# Patient Record
Sex: FEMALE | Race: BLACK OR AFRICAN AMERICAN | Employment: FULL TIME | URBAN - METROPOLITAN AREA
[De-identification: names, ages, dates, MRNs, and addresses within clinical notes are randomized per-mention and may not be internally consistent; named-entity substitution may affect disease eponyms.]

---

## 2017-01-24 ENCOUNTER — OFFICE VISIT (OUTPATIENT)
Dept: INTERNAL MEDICINE | Age: 52
End: 2017-01-24

## 2017-01-24 VITALS
HEIGHT: 65 IN | TEMPERATURE: 97.4 F | HEART RATE: 99 BPM | BODY MASS INDEX: 30.49 KG/M2 | DIASTOLIC BLOOD PRESSURE: 82 MMHG | WEIGHT: 183 LBS | SYSTOLIC BLOOD PRESSURE: 120 MMHG

## 2017-01-24 DIAGNOSIS — Z00.00 ANNUAL PHYSICAL EXAM: Primary | ICD-10-CM

## 2017-01-24 DIAGNOSIS — Z00.00 ANNUAL PHYSICAL EXAM: ICD-10-CM

## 2017-01-24 LAB
ANION GAP SERPL CALCULATED.3IONS-SCNC: 14 MEQ/L (ref 7–13)
BUN BLDV-MCNC: 11 MG/DL (ref 6–20)
CALCIUM SERPL-MCNC: 9.4 MG/DL (ref 8.6–10.2)
CHLORIDE BLD-SCNC: 97 MEQ/L (ref 98–107)
CHOLESTEROL, TOTAL: 244 MG/DL (ref 0–199)
CO2: 24 MEQ/L (ref 22–29)
CREAT SERPL-MCNC: 0.89 MG/DL (ref 0.5–0.9)
GFR AFRICAN AMERICAN: >60
GFR NON-AFRICAN AMERICAN: >60
GLUCOSE BLD-MCNC: 109 MG/DL (ref 74–109)
HBA1C MFR BLD: 5.7 % (ref 4.8–5.9)
HDLC SERPL-MCNC: 77 MG/DL (ref 40–59)
LDL CHOLESTEROL CALCULATED: 148 MG/DL (ref 0–129)
POTASSIUM SERPL-SCNC: 4.3 MEQ/L (ref 3.5–5.1)
SODIUM BLD-SCNC: 135 MEQ/L (ref 132–144)
TRIGL SERPL-MCNC: 93 MG/DL (ref 0–200)

## 2017-01-24 PROCEDURE — 99396 PREV VISIT EST AGE 40-64: CPT | Performed by: INTERNAL MEDICINE

## 2017-01-24 RX ORDER — FAMOTIDINE 40 MG/1
40 TABLET, FILM COATED ORAL EVERY EVENING
Qty: 30 TABLET | Refills: 5 | Status: SHIPPED | OUTPATIENT
Start: 2017-01-24 | End: 2019-12-13 | Stop reason: SDUPTHER

## 2017-01-24 RX ORDER — ALPRAZOLAM 0.5 MG/1
TABLET ORAL
Qty: 90 TABLET | Refills: 0 | Status: SHIPPED | OUTPATIENT
Start: 2017-01-24 | End: 2017-06-09 | Stop reason: SDUPTHER

## 2017-01-24 RX ORDER — ALBUTEROL SULFATE 90 UG/1
AEROSOL, METERED RESPIRATORY (INHALATION)
Qty: 6.7 G | Refills: 3 | Status: SHIPPED | OUTPATIENT
Start: 2017-01-24 | End: 2017-09-25 | Stop reason: SDUPTHER

## 2017-01-24 RX ORDER — PRAVASTATIN SODIUM 20 MG
20 TABLET ORAL EVERY EVENING
Qty: 30 TABLET | Refills: 5 | Status: SHIPPED | OUTPATIENT
Start: 2017-01-24 | End: 2017-11-28 | Stop reason: SDUPTHER

## 2017-01-24 RX ORDER — ALBUTEROL SULFATE 2.5 MG/3ML
SOLUTION RESPIRATORY (INHALATION)
Qty: 450 ML | Refills: 1 | Status: SHIPPED | OUTPATIENT
Start: 2017-01-24 | End: 2017-06-12

## 2017-01-24 RX ORDER — AMLODIPINE BESYLATE 5 MG/1
TABLET ORAL
Qty: 30 TABLET | Refills: 5 | Status: SHIPPED | OUTPATIENT
Start: 2017-01-24 | End: 2017-08-28 | Stop reason: SDUPTHER

## 2017-01-24 ASSESSMENT — ENCOUNTER SYMPTOMS
ABDOMINAL PAIN: 0
EYE DISCHARGE: 0
BLOOD IN STOOL: 0
ABDOMINAL DISTENTION: 0
VOICE CHANGE: 0
TROUBLE SWALLOWING: 0
PHOTOPHOBIA: 0
CHEST TIGHTNESS: 0
COUGH: 0
COLOR CHANGE: 0
EYE PAIN: 0
SHORTNESS OF BREATH: 0
BACK PAIN: 0

## 2017-01-24 ASSESSMENT — PATIENT HEALTH QUESTIONNAIRE - PHQ9
1. LITTLE INTEREST OR PLEASURE IN DOING THINGS: 0
SUM OF ALL RESPONSES TO PHQ9 QUESTIONS 1 & 2: 0
2. FEELING DOWN, DEPRESSED OR HOPELESS: 0
SUM OF ALL RESPONSES TO PHQ QUESTIONS 1-9: 0

## 2017-05-09 ENCOUNTER — TELEPHONE (OUTPATIENT)
Dept: INTERNAL MEDICINE | Age: 52
End: 2017-05-09

## 2017-05-09 RX ORDER — DOXYCYCLINE 100 MG/1
100 TABLET ORAL 2 TIMES DAILY
Qty: 20 TABLET | Refills: 0 | Status: SHIPPED | OUTPATIENT
Start: 2017-05-09 | End: 2017-05-19

## 2017-05-09 RX ORDER — METHYLPREDNISOLONE 4 MG/1
TABLET ORAL
Qty: 1 KIT | Refills: 0 | Status: SHIPPED | OUTPATIENT
Start: 2017-05-09 | End: 2017-06-14

## 2017-05-20 ENCOUNTER — HOSPITAL ENCOUNTER (EMERGENCY)
Age: 52
Discharge: HOME | End: 2017-05-20
Payer: COMMERCIAL

## 2017-05-20 ENCOUNTER — APPOINTMENT (OUTPATIENT)
Dept: GENERAL RADIOLOGY | Age: 52
End: 2017-05-20
Payer: COMMERCIAL

## 2017-05-20 VITALS
BODY MASS INDEX: 30.29 KG/M2 | HEART RATE: 111 BPM | DIASTOLIC BLOOD PRESSURE: 88 MMHG | TEMPERATURE: 98.4 F | SYSTOLIC BLOOD PRESSURE: 126 MMHG | RESPIRATION RATE: 18 BRPM | OXYGEN SATURATION: 97 % | WEIGHT: 182 LBS

## 2017-05-20 DIAGNOSIS — J01.10 ACUTE NON-RECURRENT FRONTAL SINUSITIS: Primary | ICD-10-CM

## 2017-05-20 LAB
ALBUMIN SERPL-MCNC: 4.3 G/DL (ref 3.9–4.9)
ALP BLD-CCNC: 65 U/L (ref 40–130)
ALT SERPL-CCNC: 12 U/L (ref 0–33)
ANION GAP SERPL CALCULATED.3IONS-SCNC: 13 MEQ/L (ref 7–13)
AST SERPL-CCNC: 16 U/L (ref 0–35)
BASOPHILS ABSOLUTE: 0.1 K/UL (ref 0–0.2)
BASOPHILS RELATIVE PERCENT: 0.9 %
BILIRUB SERPL-MCNC: 0.7 MG/DL (ref 0–1.2)
BUN BLDV-MCNC: 8 MG/DL (ref 6–20)
CALCIUM SERPL-MCNC: 9 MG/DL (ref 8.6–10.2)
CHLORIDE BLD-SCNC: 101 MEQ/L (ref 98–107)
CO2: 22 MEQ/L (ref 22–29)
CREAT SERPL-MCNC: 0.8 MG/DL (ref 0.5–0.9)
EOSINOPHILS ABSOLUTE: 0.9 K/UL (ref 0–0.7)
EOSINOPHILS RELATIVE PERCENT: 11.4 %
GFR AFRICAN AMERICAN: >60
GFR NON-AFRICAN AMERICAN: >60
GLOBULIN: 2.6 G/DL (ref 2.3–3.5)
GLUCOSE BLD-MCNC: 113 MG/DL (ref 74–109)
HCT VFR BLD CALC: 36.1 % (ref 37–47)
HEMOGLOBIN: 10.9 G/DL (ref 12–16)
LYMPHOCYTES ABSOLUTE: 1.1 K/UL (ref 1–4.8)
LYMPHOCYTES RELATIVE PERCENT: 13.5 %
MCH RBC QN AUTO: 23.5 PG (ref 27–31.3)
MCHC RBC AUTO-ENTMCNC: 30.3 % (ref 33–37)
MCV RBC AUTO: 77.8 FL (ref 82–100)
MONOCYTES ABSOLUTE: 0.8 K/UL (ref 0.2–0.8)
MONOCYTES RELATIVE PERCENT: 9.9 %
NEUTROPHILS ABSOLUTE: 5.1 K/UL (ref 1.4–6.5)
NEUTROPHILS RELATIVE PERCENT: 64.3 %
PDW BLD-RTO: 17.9 % (ref 11.5–14.5)
PLATELET # BLD: 277 K/UL (ref 130–400)
POTASSIUM SERPL-SCNC: 3.6 MEQ/L (ref 3.5–5.1)
RBC # BLD: 4.64 M/UL (ref 4.2–5.4)
SODIUM BLD-SCNC: 136 MEQ/L (ref 132–144)
TOTAL CK: 113 U/L (ref 0–170)
TOTAL PROTEIN: 6.9 G/DL (ref 6.4–8.1)
TROPONIN: <0.01 NG/ML (ref 0–0.01)
WBC # BLD: 7.9 K/UL (ref 4.8–10.8)

## 2017-05-20 PROCEDURE — 6360000002 HC RX W HCPCS: Performed by: NURSE PRACTITIONER

## 2017-05-20 PROCEDURE — 85025 COMPLETE CBC W/AUTO DIFF WBC: CPT

## 2017-05-20 PROCEDURE — 96374 THER/PROPH/DIAG INJ IV PUSH: CPT

## 2017-05-20 PROCEDURE — 84484 ASSAY OF TROPONIN QUANT: CPT

## 2017-05-20 PROCEDURE — 99285 EMERGENCY DEPT VISIT HI MDM: CPT

## 2017-05-20 PROCEDURE — 2580000003 HC RX 258: Performed by: NURSE PRACTITIONER

## 2017-05-20 PROCEDURE — 6370000000 HC RX 637 (ALT 250 FOR IP): Performed by: NURSE PRACTITIONER

## 2017-05-20 PROCEDURE — 93005 ELECTROCARDIOGRAM TRACING: CPT

## 2017-05-20 PROCEDURE — 96361 HYDRATE IV INFUSION ADD-ON: CPT

## 2017-05-20 PROCEDURE — 82550 ASSAY OF CK (CPK): CPT

## 2017-05-20 PROCEDURE — 80053 COMPREHEN METABOLIC PANEL: CPT

## 2017-05-20 PROCEDURE — 71010 XR CHEST PORTABLE: CPT

## 2017-05-20 PROCEDURE — 94640 AIRWAY INHALATION TREATMENT: CPT

## 2017-05-20 PROCEDURE — 36415 COLL VENOUS BLD VENIPUNCTURE: CPT

## 2017-05-20 RX ORDER — 0.9 % SODIUM CHLORIDE 0.9 %
1000 INTRAVENOUS SOLUTION INTRAVENOUS ONCE
Status: COMPLETED | OUTPATIENT
Start: 2017-05-20 | End: 2017-05-20

## 2017-05-20 RX ORDER — BENZONATATE 200 MG/1
200 CAPSULE ORAL 3 TIMES DAILY PRN
Qty: 21 CAPSULE | Refills: 0 | Status: SHIPPED | OUTPATIENT
Start: 2017-05-20 | End: 2019-11-25 | Stop reason: SDUPTHER

## 2017-05-20 RX ORDER — PREDNISONE 50 MG/1
50 TABLET ORAL DAILY
Qty: 5 TABLET | Refills: 0 | Status: SHIPPED | OUTPATIENT
Start: 2017-05-20 | End: 2017-05-25

## 2017-05-20 RX ORDER — ACETAMINOPHEN 500 MG
1000 TABLET ORAL ONCE
Status: DISCONTINUED | OUTPATIENT
Start: 2017-05-20 | End: 2017-05-20 | Stop reason: HOSPADM

## 2017-05-20 RX ORDER — IPRATROPIUM BROMIDE AND ALBUTEROL SULFATE 2.5; .5 MG/3ML; MG/3ML
1 SOLUTION RESPIRATORY (INHALATION) ONCE
Status: COMPLETED | OUTPATIENT
Start: 2017-05-20 | End: 2017-05-20

## 2017-05-20 RX ORDER — METHYLPREDNISOLONE SODIUM SUCCINATE 125 MG/2ML
125 INJECTION, POWDER, LYOPHILIZED, FOR SOLUTION INTRAMUSCULAR; INTRAVENOUS ONCE
Status: COMPLETED | OUTPATIENT
Start: 2017-05-20 | End: 2017-05-20

## 2017-05-20 RX ADMIN — ALBUTEROL SULFATE 5 MG: 2.5 SOLUTION RESPIRATORY (INHALATION) at 08:47

## 2017-05-20 RX ADMIN — IPRATROPIUM BROMIDE AND ALBUTEROL SULFATE 1 AMPULE: .5; 3 SOLUTION RESPIRATORY (INHALATION) at 08:33

## 2017-05-20 RX ADMIN — SODIUM CHLORIDE 1000 ML: 9 INJECTION, SOLUTION INTRAVENOUS at 10:12

## 2017-05-20 RX ADMIN — METHYLPREDNISOLONE SODIUM SUCCINATE 125 MG: 125 INJECTION, POWDER, FOR SOLUTION INTRAMUSCULAR; INTRAVENOUS at 09:12

## 2017-05-20 ASSESSMENT — ENCOUNTER SYMPTOMS
SINUS PRESSURE: 1
WHEEZING: 1
TROUBLE SWALLOWING: 0
COUGH: 1
SHORTNESS OF BREATH: 0
NAUSEA: 1
SORE THROAT: 0
ABDOMINAL PAIN: 0
RHINORRHEA: 1

## 2017-05-20 ASSESSMENT — PAIN DESCRIPTION - ORIENTATION: ORIENTATION: MID

## 2017-05-20 ASSESSMENT — PAIN DESCRIPTION - LOCATION: LOCATION: CHEST

## 2017-05-20 ASSESSMENT — PAIN SCALES - GENERAL: PAINLEVEL_OUTOF10: 10

## 2017-05-20 ASSESSMENT — PAIN DESCRIPTION - PAIN TYPE: TYPE: ACUTE PAIN

## 2017-05-26 LAB
EKG ATRIAL RATE: 118 BPM
EKG P AXIS: 64 DEGREES
EKG P-R INTERVAL: 144 MS
EKG Q-T INTERVAL: 342 MS
EKG QRS DURATION: 84 MS
EKG QTC CALCULATION (BAZETT): 479 MS
EKG R AXIS: 12 DEGREES
EKG T AXIS: 13 DEGREES
EKG VENTRICULAR RATE: 118 BPM

## 2017-06-12 RX ORDER — MONTELUKAST SODIUM 10 MG/1
TABLET ORAL
Qty: 90 TABLET | Refills: 0 | Status: SHIPPED | OUTPATIENT
Start: 2017-06-12 | End: 2018-04-30 | Stop reason: SDUPTHER

## 2017-06-12 RX ORDER — BUDESONIDE 0.25 MG/2ML
INHALANT ORAL
Qty: 120 ML | Refills: 0 | Status: SHIPPED | OUTPATIENT
Start: 2017-06-12 | End: 2019-11-25 | Stop reason: SDUPTHER

## 2017-06-12 RX ORDER — ALBUTEROL SULFATE 2.5 MG/3ML
SOLUTION RESPIRATORY (INHALATION)
Qty: 450 ML | Refills: 0 | Status: SHIPPED | OUTPATIENT
Start: 2017-06-12 | End: 2017-07-07 | Stop reason: SDUPTHER

## 2017-06-12 RX ORDER — ALPRAZOLAM 0.5 MG/1
TABLET ORAL
Qty: 90 TABLET | Refills: 0 | Status: SHIPPED | OUTPATIENT
Start: 2017-06-12 | End: 2017-07-07 | Stop reason: SDUPTHER

## 2017-06-14 ENCOUNTER — TELEPHONE (OUTPATIENT)
Dept: INTERNAL MEDICINE | Age: 52
End: 2017-06-14

## 2017-06-14 RX ORDER — PREDNISONE 10 MG/1
20 TABLET ORAL 2 TIMES DAILY
Qty: 20 TABLET | Refills: 0 | Status: SHIPPED | OUTPATIENT
Start: 2017-06-14 | End: 2017-08-21 | Stop reason: SDUPTHER

## 2017-07-07 RX ORDER — ALPRAZOLAM 0.5 MG/1
TABLET ORAL
Qty: 30 TABLET | Refills: 0 | Status: SHIPPED | OUTPATIENT
Start: 2017-07-07 | End: 2018-01-30

## 2017-07-08 RX ORDER — ALBUTEROL SULFATE 2.5 MG/3ML
SOLUTION RESPIRATORY (INHALATION)
Qty: 450 ML | Refills: 0 | Status: SHIPPED | OUTPATIENT
Start: 2017-07-08 | End: 2017-11-30 | Stop reason: SDUPTHER

## 2017-08-01 ENCOUNTER — HOSPITAL ENCOUNTER (EMERGENCY)
Age: 52
Discharge: HOME | End: 2017-08-01
Attending: STUDENT IN AN ORGANIZED HEALTH CARE EDUCATION/TRAINING PROGRAM
Payer: COMMERCIAL

## 2017-08-01 ENCOUNTER — APPOINTMENT (OUTPATIENT)
Dept: GENERAL RADIOLOGY | Age: 52
End: 2017-08-01
Payer: COMMERCIAL

## 2017-08-01 VITALS
BODY MASS INDEX: 31.28 KG/M2 | RESPIRATION RATE: 16 BRPM | OXYGEN SATURATION: 100 % | TEMPERATURE: 97.8 F | SYSTOLIC BLOOD PRESSURE: 140 MMHG | WEIGHT: 188 LBS | HEART RATE: 96 BPM | DIASTOLIC BLOOD PRESSURE: 93 MMHG

## 2017-08-01 DIAGNOSIS — J30.1 SEASONAL ALLERGIC RHINITIS DUE TO POLLEN: ICD-10-CM

## 2017-08-01 DIAGNOSIS — J45.31 MILD PERSISTENT ASTHMA WITH ACUTE EXACERBATION: Primary | ICD-10-CM

## 2017-08-01 LAB
ALBUMIN SERPL-MCNC: 4.7 G/DL (ref 3.9–4.9)
ALP BLD-CCNC: 74 U/L (ref 40–130)
ALT SERPL-CCNC: 18 U/L (ref 0–33)
ANION GAP SERPL CALCULATED.3IONS-SCNC: 15 MEQ/L (ref 7–13)
ANISOCYTOSIS: ABNORMAL
AST SERPL-CCNC: 24 U/L (ref 0–35)
BASOPHILS ABSOLUTE: 0.1 K/UL (ref 0–0.2)
BASOPHILS RELATIVE PERCENT: 1 %
BILIRUB SERPL-MCNC: 0.9 MG/DL (ref 0–1.2)
BUN BLDV-MCNC: 11 MG/DL (ref 6–20)
CALCIUM SERPL-MCNC: 9.7 MG/DL (ref 8.6–10.2)
CHLORIDE BLD-SCNC: 102 MEQ/L (ref 98–107)
CO2: 24 MEQ/L (ref 22–29)
CREAT SERPL-MCNC: 0.74 MG/DL (ref 0.5–0.9)
EOSINOPHILS ABSOLUTE: 0.9 K/UL (ref 0–0.7)
EOSINOPHILS RELATIVE PERCENT: 12 %
GFR AFRICAN AMERICAN: >60
GFR NON-AFRICAN AMERICAN: >60
GLOBULIN: 3.1 G/DL (ref 2.3–3.5)
GLUCOSE BLD-MCNC: 100 MG/DL (ref 74–109)
HCT VFR BLD CALC: 38.9 % (ref 37–47)
HEMOGLOBIN: 12.2 G/DL (ref 12–16)
HYPOCHROMIA: ABNORMAL
LYMPHOCYTES ABSOLUTE: 0.4 K/UL (ref 1–4.8)
LYMPHOCYTES RELATIVE PERCENT: 6 %
MACROCYTES: 0
MAGNESIUM: 1.9 MG/DL (ref 1.7–2.3)
MCH RBC QN AUTO: 23.4 PG (ref 27–31.3)
MCHC RBC AUTO-ENTMCNC: 31.2 % (ref 33–37)
MCV RBC AUTO: 75 FL (ref 82–100)
MICROCYTES: ABNORMAL
MONOCYTES ABSOLUTE: 0.4 K/UL (ref 0.2–0.8)
MONOCYTES RELATIVE PERCENT: 5.4 %
NEUTROPHILS ABSOLUTE: 5.3 K/UL (ref 1.4–6.5)
NEUTROPHILS RELATIVE PERCENT: 75 %
PDW BLD-RTO: 17.5 % (ref 11.5–14.5)
PLATELET # BLD: 347 K/UL (ref 130–400)
PLATELET SLIDE REVIEW: ADEQUATE
POIKILOCYTES: 0
POTASSIUM SERPL-SCNC: 3.8 MEQ/L (ref 3.5–5.1)
PREGNANCY TEST URINE, POC: NORMAL
PROMONOCYTES: 1 %
RBC # BLD: 5.19 M/UL (ref 4.2–5.4)
SLIDE REVIEW: ABNORMAL
SODIUM BLD-SCNC: 141 MEQ/L (ref 132–144)
TOTAL PROTEIN: 7.8 G/DL (ref 6.4–8.1)
WBC # BLD: 7.1 K/UL (ref 4.8–10.8)

## 2017-08-01 PROCEDURE — 6360000002 HC RX W HCPCS: Performed by: STUDENT IN AN ORGANIZED HEALTH CARE EDUCATION/TRAINING PROGRAM

## 2017-08-01 PROCEDURE — 96365 THER/PROPH/DIAG IV INF INIT: CPT

## 2017-08-01 PROCEDURE — 6370000000 HC RX 637 (ALT 250 FOR IP): Performed by: STUDENT IN AN ORGANIZED HEALTH CARE EDUCATION/TRAINING PROGRAM

## 2017-08-01 PROCEDURE — 83735 ASSAY OF MAGNESIUM: CPT

## 2017-08-01 PROCEDURE — 99285 EMERGENCY DEPT VISIT HI MDM: CPT

## 2017-08-01 PROCEDURE — 85025 COMPLETE CBC W/AUTO DIFF WBC: CPT

## 2017-08-01 PROCEDURE — 36415 COLL VENOUS BLD VENIPUNCTURE: CPT

## 2017-08-01 PROCEDURE — 71020 XR CHEST STANDARD TWO VW: CPT

## 2017-08-01 PROCEDURE — 96375 TX/PRO/DX INJ NEW DRUG ADDON: CPT

## 2017-08-01 PROCEDURE — 87040 BLOOD CULTURE FOR BACTERIA: CPT

## 2017-08-01 PROCEDURE — 80053 COMPREHEN METABOLIC PANEL: CPT

## 2017-08-01 PROCEDURE — 94640 AIRWAY INHALATION TREATMENT: CPT

## 2017-08-01 RX ORDER — PREDNISONE 10 MG/1
50 TABLET ORAL DAILY
Qty: 25 TABLET | Refills: 0 | Status: SHIPPED | OUTPATIENT
Start: 2017-08-01 | End: 2017-08-06

## 2017-08-01 RX ORDER — METHYLPREDNISOLONE SODIUM SUCCINATE 125 MG/2ML
125 INJECTION, POWDER, LYOPHILIZED, FOR SOLUTION INTRAMUSCULAR; INTRAVENOUS ONCE
Status: COMPLETED | OUTPATIENT
Start: 2017-08-01 | End: 2017-08-01

## 2017-08-01 RX ORDER — MAGNESIUM SULFATE IN WATER 40 MG/ML
4 INJECTION, SOLUTION INTRAVENOUS ONCE
Status: COMPLETED | OUTPATIENT
Start: 2017-08-01 | End: 2017-08-01

## 2017-08-01 RX ORDER — ALBUTEROL SULFATE 2.5 MG/3ML
2.5 SOLUTION RESPIRATORY (INHALATION) ONCE
Status: COMPLETED | OUTPATIENT
Start: 2017-08-01 | End: 2017-08-01

## 2017-08-01 RX ORDER — CETIRIZINE HYDROCHLORIDE 10 MG/1
10 TABLET ORAL DAILY
Qty: 30 TABLET | Refills: 0 | Status: SHIPPED | OUTPATIENT
Start: 2017-08-01 | End: 2017-08-31

## 2017-08-01 RX ORDER — IPRATROPIUM BROMIDE AND ALBUTEROL SULFATE 2.5; .5 MG/3ML; MG/3ML
1 SOLUTION RESPIRATORY (INHALATION) ONCE
Status: COMPLETED | OUTPATIENT
Start: 2017-08-01 | End: 2017-08-01

## 2017-08-01 RX ORDER — ECHINACEA PURPUREA EXTRACT 125 MG
1 TABLET ORAL PRN
Qty: 1 BOTTLE | Refills: 3 | Status: SHIPPED | OUTPATIENT
Start: 2017-08-01 | End: 2020-11-11

## 2017-08-01 RX ADMIN — MAGNESIUM SULFATE HEPTAHYDRATE 4 G: 40 INJECTION, SOLUTION INTRAVENOUS at 10:47

## 2017-08-01 RX ADMIN — METHYLPREDNISOLONE SODIUM SUCCINATE 125 MG: 125 INJECTION, POWDER, FOR SOLUTION INTRAMUSCULAR; INTRAVENOUS at 10:46

## 2017-08-01 RX ADMIN — ALBUTEROL SULFATE 2.5 MG: 2.5 SOLUTION RESPIRATORY (INHALATION) at 09:55

## 2017-08-01 RX ADMIN — IPRATROPIUM BROMIDE AND ALBUTEROL SULFATE 1 AMPULE: .5; 3 SOLUTION RESPIRATORY (INHALATION) at 09:51

## 2017-08-01 RX ADMIN — IPRATROPIUM BROMIDE 0.5 MG: 0.5 SOLUTION RESPIRATORY (INHALATION) at 09:55

## 2017-08-01 RX ADMIN — ALBUTEROL SULFATE 2.5 MG: 2.5 SOLUTION RESPIRATORY (INHALATION) at 11:13

## 2017-08-01 ASSESSMENT — PULMONARY FUNCTION TESTS
PEFR_L/MIN: 190
PEFR_L/MIN: 250
PEFR_L/MIN: 150

## 2017-08-01 ASSESSMENT — ENCOUNTER SYMPTOMS
SHORTNESS OF BREATH: 0
DIARRHEA: 0
NAUSEA: 0
BACK PAIN: 0
SINUS PRESSURE: 0
COUGH: 1
CHEST TIGHTNESS: 0
TROUBLE SWALLOWING: 0
ABDOMINAL PAIN: 0
VOMITING: 0
WHEEZING: 1
RHINORRHEA: 1

## 2017-08-01 ASSESSMENT — PAIN DESCRIPTION - LOCATION: LOCATION: CHEST

## 2017-08-01 ASSESSMENT — PAIN SCALES - GENERAL: PAINLEVEL_OUTOF10: 9

## 2017-08-06 LAB
BLOOD CULTURE, ROUTINE: NORMAL
CULTURE, BLOOD 2: NORMAL

## 2017-08-21 RX ORDER — BENZONATATE 100 MG/1
CAPSULE ORAL
Qty: 30 CAPSULE | Refills: 0 | Status: SHIPPED | OUTPATIENT
Start: 2017-08-21 | End: 2017-12-29 | Stop reason: SDUPTHER

## 2017-08-21 RX ORDER — PREDNISONE 10 MG/1
TABLET ORAL
Qty: 20 TABLET | Refills: 0 | Status: SHIPPED | OUTPATIENT
Start: 2017-08-21 | End: 2017-09-25 | Stop reason: SDUPTHER

## 2017-08-28 RX ORDER — AMLODIPINE BESYLATE 5 MG/1
TABLET ORAL
Qty: 30 TABLET | Refills: 3 | Status: SHIPPED | OUTPATIENT
Start: 2017-08-28 | End: 2017-09-25 | Stop reason: SDUPTHER

## 2017-09-25 RX ORDER — AMLODIPINE BESYLATE 5 MG/1
TABLET ORAL
Qty: 30 TABLET | Refills: 3 | Status: SHIPPED | OUTPATIENT
Start: 2017-09-25 | End: 2018-04-02

## 2017-09-25 RX ORDER — CETIRIZINE HYDROCHLORIDE 10 MG/1
TABLET ORAL
Refills: 0 | COMMUNITY
Start: 2017-08-01 | End: 2021-02-10

## 2017-09-25 RX ORDER — PREDNISONE 10 MG/1
TABLET ORAL
Qty: 20 TABLET | Refills: 0 | Status: ON HOLD | OUTPATIENT
Start: 2017-09-25 | End: 2017-10-25 | Stop reason: HOSPADM

## 2017-09-25 RX ORDER — ALBUTEROL SULFATE 90 UG/1
AEROSOL, METERED RESPIRATORY (INHALATION)
Qty: 6.7 G | Refills: 3 | Status: SHIPPED | OUTPATIENT
Start: 2017-09-25 | End: 2018-11-13 | Stop reason: ALTCHOICE

## 2017-10-23 ENCOUNTER — HOSPITAL ENCOUNTER (INPATIENT)
Age: 52
LOS: 1 days | Discharge: HOME | DRG: 202 | End: 2017-10-25
Attending: EMERGENCY MEDICINE | Admitting: INTERNAL MEDICINE
Payer: COMMERCIAL

## 2017-10-23 ENCOUNTER — APPOINTMENT (OUTPATIENT)
Dept: GENERAL RADIOLOGY | Age: 52
DRG: 202 | End: 2017-10-23
Payer: COMMERCIAL

## 2017-10-23 DIAGNOSIS — J45.21 EXACERBATION OF INTERMITTENT ASTHMA, UNSPECIFIED ASTHMA SEVERITY: Primary | ICD-10-CM

## 2017-10-23 DIAGNOSIS — J40 BRONCHITIS: ICD-10-CM

## 2017-10-23 LAB
ANION GAP SERPL CALCULATED.3IONS-SCNC: 15 MEQ/L (ref 7–13)
BUN BLDV-MCNC: 8 MG/DL (ref 6–20)
CALCIUM SERPL-MCNC: 9.5 MG/DL (ref 8.6–10.2)
CHLORIDE BLD-SCNC: 105 MEQ/L (ref 98–107)
CO2: 24 MEQ/L (ref 22–29)
CREAT SERPL-MCNC: 0.79 MG/DL (ref 0.5–0.9)
GFR AFRICAN AMERICAN: >60
GFR NON-AFRICAN AMERICAN: >60
GLUCOSE BLD-MCNC: 100 MG/DL (ref 74–109)
HCT VFR BLD CALC: 35.3 % (ref 37–47)
HEMOGLOBIN: 11.2 G/DL (ref 12–16)
MCH RBC QN AUTO: 24.2 PG (ref 27–31.3)
MCHC RBC AUTO-ENTMCNC: 31.7 % (ref 33–37)
MCV RBC AUTO: 76.3 FL (ref 82–100)
PDW BLD-RTO: 20.6 % (ref 11.5–14.5)
PLATELET # BLD: 375 K/UL (ref 130–400)
POTASSIUM SERPL-SCNC: 4.2 MEQ/L (ref 3.5–5.1)
RBC # BLD: 4.62 M/UL (ref 4.2–5.4)
SODIUM BLD-SCNC: 144 MEQ/L (ref 132–144)
WBC # BLD: 5 K/UL (ref 4.8–10.8)

## 2017-10-23 PROCEDURE — 94640 AIRWAY INHALATION TREATMENT: CPT

## 2017-10-23 PROCEDURE — 2580000003 HC RX 258: Performed by: EMERGENCY MEDICINE

## 2017-10-23 PROCEDURE — 96375 TX/PRO/DX INJ NEW DRUG ADDON: CPT

## 2017-10-23 PROCEDURE — 2500000003 HC RX 250 WO HCPCS: Performed by: EMERGENCY MEDICINE

## 2017-10-23 PROCEDURE — 71020 XR CHEST STANDARD TWO VW: CPT

## 2017-10-23 PROCEDURE — 96367 TX/PROPH/DG ADDL SEQ IV INF: CPT

## 2017-10-23 PROCEDURE — 6360000002 HC RX W HCPCS: Performed by: EMERGENCY MEDICINE

## 2017-10-23 PROCEDURE — 36415 COLL VENOUS BLD VENIPUNCTURE: CPT

## 2017-10-23 PROCEDURE — 99285 EMERGENCY DEPT VISIT HI MDM: CPT

## 2017-10-23 PROCEDURE — 6370000000 HC RX 637 (ALT 250 FOR IP): Performed by: PHYSICIAN ASSISTANT

## 2017-10-23 PROCEDURE — 96365 THER/PROPH/DIAG IV INF INIT: CPT

## 2017-10-23 PROCEDURE — 96366 THER/PROPH/DIAG IV INF ADDON: CPT

## 2017-10-23 PROCEDURE — 96376 TX/PRO/DX INJ SAME DRUG ADON: CPT

## 2017-10-23 PROCEDURE — G0378 HOSPITAL OBSERVATION PER HR: HCPCS

## 2017-10-23 PROCEDURE — 99222 1ST HOSP IP/OBS MODERATE 55: CPT | Performed by: INTERNAL MEDICINE

## 2017-10-23 PROCEDURE — 94760 N-INVAS EAR/PLS OXIMETRY 1: CPT

## 2017-10-23 PROCEDURE — 94664 DEMO&/EVAL PT USE INHALER: CPT

## 2017-10-23 PROCEDURE — 2580000003 HC RX 258: Performed by: PHYSICIAN ASSISTANT

## 2017-10-23 PROCEDURE — 6360000002 HC RX W HCPCS: Performed by: PHYSICIAN ASSISTANT

## 2017-10-23 PROCEDURE — 6370000000 HC RX 637 (ALT 250 FOR IP): Performed by: INTERNAL MEDICINE

## 2017-10-23 PROCEDURE — 85027 COMPLETE CBC AUTOMATED: CPT

## 2017-10-23 PROCEDURE — 80048 BASIC METABOLIC PNL TOTAL CA: CPT

## 2017-10-23 RX ORDER — SODIUM CHLORIDE 0.9 % (FLUSH) 0.9 %
10 SYRINGE (ML) INJECTION EVERY 12 HOURS SCHEDULED
Status: DISCONTINUED | OUTPATIENT
Start: 2017-10-23 | End: 2017-10-25 | Stop reason: HOSPADM

## 2017-10-23 RX ORDER — ONDANSETRON 2 MG/ML
4 INJECTION INTRAMUSCULAR; INTRAVENOUS EVERY 6 HOURS PRN
Status: DISCONTINUED | OUTPATIENT
Start: 2017-10-23 | End: 2017-10-25 | Stop reason: HOSPADM

## 2017-10-23 RX ORDER — METHYLPREDNISOLONE SODIUM SUCCINATE 40 MG/ML
40 INJECTION, POWDER, LYOPHILIZED, FOR SOLUTION INTRAMUSCULAR; INTRAVENOUS EVERY 8 HOURS
Status: DISCONTINUED | OUTPATIENT
Start: 2017-10-23 | End: 2017-10-25 | Stop reason: HOSPADM

## 2017-10-23 RX ORDER — SODIUM CHLORIDE 0.9 % (FLUSH) 0.9 %
10 SYRINGE (ML) INJECTION PRN
Status: DISCONTINUED | OUTPATIENT
Start: 2017-10-23 | End: 2017-10-25 | Stop reason: HOSPADM

## 2017-10-23 RX ORDER — METHYLPREDNISOLONE SODIUM SUCCINATE 125 MG/2ML
60 INJECTION, POWDER, LYOPHILIZED, FOR SOLUTION INTRAMUSCULAR; INTRAVENOUS ONCE
Status: COMPLETED | OUTPATIENT
Start: 2017-10-23 | End: 2017-10-23

## 2017-10-23 RX ORDER — IPRATROPIUM BROMIDE AND ALBUTEROL SULFATE 2.5; .5 MG/3ML; MG/3ML
1 SOLUTION RESPIRATORY (INHALATION)
Status: DISCONTINUED | OUTPATIENT
Start: 2017-10-23 | End: 2017-10-23

## 2017-10-23 RX ORDER — AMLODIPINE BESYLATE 5 MG/1
5 TABLET ORAL DAILY
Status: DISCONTINUED | OUTPATIENT
Start: 2017-10-24 | End: 2017-10-25 | Stop reason: HOSPADM

## 2017-10-23 RX ORDER — PRAVASTATIN SODIUM 20 MG
20 TABLET ORAL EVERY EVENING
Status: DISCONTINUED | OUTPATIENT
Start: 2017-10-23 | End: 2017-10-25 | Stop reason: HOSPADM

## 2017-10-23 RX ORDER — 0.9 % SODIUM CHLORIDE 0.9 %
1000 INTRAVENOUS SOLUTION INTRAVENOUS ONCE
Status: COMPLETED | OUTPATIENT
Start: 2017-10-23 | End: 2017-10-23

## 2017-10-23 RX ORDER — ALPRAZOLAM 0.5 MG/1
0.5 TABLET ORAL 3 TIMES DAILY PRN
COMMUNITY
End: 2018-01-30 | Stop reason: SDUPTHER

## 2017-10-23 RX ORDER — ALBUTEROL SULFATE 2.5 MG/3ML
2.5 SOLUTION RESPIRATORY (INHALATION)
Status: DISCONTINUED | OUTPATIENT
Start: 2017-10-23 | End: 2017-10-25 | Stop reason: HOSPADM

## 2017-10-23 RX ORDER — ALPRAZOLAM 0.5 MG/1
0.5 TABLET ORAL 2 TIMES DAILY PRN
Status: DISCONTINUED | OUTPATIENT
Start: 2017-10-23 | End: 2017-10-25 | Stop reason: HOSPADM

## 2017-10-23 RX ORDER — IPRATROPIUM BROMIDE AND ALBUTEROL SULFATE 2.5; .5 MG/3ML; MG/3ML
1 SOLUTION RESPIRATORY (INHALATION) 3 TIMES DAILY
Status: DISCONTINUED | OUTPATIENT
Start: 2017-10-23 | End: 2017-10-25 | Stop reason: HOSPADM

## 2017-10-23 RX ORDER — ACETAMINOPHEN 325 MG/1
650 TABLET ORAL EVERY 4 HOURS PRN
Status: DISCONTINUED | OUTPATIENT
Start: 2017-10-23 | End: 2017-10-25 | Stop reason: HOSPADM

## 2017-10-23 RX ADMIN — IPRATROPIUM BROMIDE AND ALBUTEROL SULFATE 1 AMPULE: .5; 3 SOLUTION RESPIRATORY (INHALATION) at 19:59

## 2017-10-23 RX ADMIN — IPRATROPIUM BROMIDE 0.5 MG: 0.5 SOLUTION RESPIRATORY (INHALATION) at 09:55

## 2017-10-23 RX ADMIN — SODIUM CHLORIDE, PRESERVATIVE FREE 10 ML: 5 INJECTION INTRAVENOUS at 20:57

## 2017-10-23 RX ADMIN — MAGNESIUM SULFATE HEPTAHYDRATE 1 G: 500 INJECTION, SOLUTION INTRAMUSCULAR; INTRAVENOUS at 10:12

## 2017-10-23 RX ADMIN — IPRATROPIUM BROMIDE 0.5 MG: 0.5 SOLUTION RESPIRATORY (INHALATION) at 10:08

## 2017-10-23 RX ADMIN — DOXYCYCLINE 100 MG: 100 INJECTION, POWDER, LYOPHILIZED, FOR SOLUTION INTRAVENOUS at 22:33

## 2017-10-23 RX ADMIN — METHYLPREDNISOLONE SODIUM SUCCINATE 40 MG: 40 INJECTION, POWDER, FOR SOLUTION INTRAMUSCULAR; INTRAVENOUS at 12:39

## 2017-10-23 RX ADMIN — ENOXAPARIN SODIUM 40 MG: 40 INJECTION, SOLUTION INTRAVENOUS; SUBCUTANEOUS at 12:39

## 2017-10-23 RX ADMIN — DOXYCYCLINE 100 MG: 100 INJECTION, POWDER, LYOPHILIZED, FOR SOLUTION INTRAVENOUS at 11:15

## 2017-10-23 RX ADMIN — ALBUTEROL SULFATE 2.5 MG: 2.5 SOLUTION RESPIRATORY (INHALATION) at 10:08

## 2017-10-23 RX ADMIN — ALBUTEROL SULFATE 2.5 MG: 2.5 SOLUTION RESPIRATORY (INHALATION) at 09:36

## 2017-10-23 RX ADMIN — ONDANSETRON 4 MG: 2 INJECTION INTRAMUSCULAR; INTRAVENOUS at 22:33

## 2017-10-23 RX ADMIN — METHYLPREDNISOLONE SODIUM SUCCINATE 60 MG: 125 INJECTION, POWDER, FOR SOLUTION INTRAMUSCULAR; INTRAVENOUS at 10:00

## 2017-10-23 RX ADMIN — METHYLPREDNISOLONE SODIUM SUCCINATE 40 MG: 40 INJECTION, POWDER, FOR SOLUTION INTRAMUSCULAR; INTRAVENOUS at 20:57

## 2017-10-23 RX ADMIN — SODIUM CHLORIDE 1000 ML: 9 INJECTION, SOLUTION INTRAVENOUS at 09:59

## 2017-10-23 RX ADMIN — ALBUTEROL SULFATE 2.5 MG: 2.5 SOLUTION RESPIRATORY (INHALATION) at 09:55

## 2017-10-23 RX ADMIN — IPRATROPIUM BROMIDE AND ALBUTEROL SULFATE 1 AMPULE: .5; 3 SOLUTION RESPIRATORY (INHALATION) at 13:59

## 2017-10-23 RX ADMIN — IPRATROPIUM BROMIDE 0.5 MG: 0.5 SOLUTION RESPIRATORY (INHALATION) at 09:35

## 2017-10-23 RX ADMIN — ACETAMINOPHEN 650 MG: 325 TABLET ORAL at 17:10

## 2017-10-23 ASSESSMENT — ENCOUNTER SYMPTOMS
WHEEZING: 1
VOMITING: 0
SHORTNESS OF BREATH: 1
COUGH: 1

## 2017-10-23 ASSESSMENT — PAIN DESCRIPTION - PAIN TYPE: TYPE: ACUTE PAIN

## 2017-10-23 ASSESSMENT — PAIN DESCRIPTION - DESCRIPTORS: DESCRIPTORS: SHARP

## 2017-10-23 ASSESSMENT — PAIN SCALES - GENERAL
PAINLEVEL_OUTOF10: 8
PAINLEVEL_OUTOF10: 6

## 2017-10-23 ASSESSMENT — PAIN DESCRIPTION - LOCATION: LOCATION: CHEST;HEAD;RIB CAGE

## 2017-10-23 NOTE — ED TRIAGE NOTES
Pt here for sob for a week,  Inhalers not working. Just finished a dose of prednisone a week ago for sob. Pt awake alert oriented  3 mucous membranes pink and moist.  Breath sounds diminished throughout with Insp and exp wheezing in all lung fields. Pt coughing up clear secretions.

## 2017-10-23 NOTE — ED PROVIDER NOTES
[facility] ED  eMERGENCY dEPARTMENT eNCOUnter      Pt Name: Frandy Del Cid  MRN: [id number]  [place] 1965  Date of evaluation: 10/23/2017  Provider: Nettie Lopez DO    CHIEF COMPLAINT       Chief Complaint   Patient presents with    Shortness of Breath     SOB x 1 week with Hx of asthma, inhalers not helping and c/o cold symptoms with cough, congestion, sinus drainage         HISTORY OF PRESENT ILLNESS   (Location/Symptom, Timing/Onset, Context/Setting, Quality, Duration, Modifying Factors, Severity)  Note limiting factors. Frandy Del Cid is a 46 y.o. female who presents to the emergency department Complaining of difficulty in breathing. She states she has been wheezing and it is hard to take a breath in and out. This is been present for approximately one week and is not getting better and is getting slightly worse. She does have a history of asthma for which she has never been intubated or admitted to the ICU. She has been using her inhalers and nebulizers at home with minimal relief. She does have a cough, she has no fever but she has had some chills. She is not a smoker and has never been a smoker. She denies any recent travel. Denies chest pain. Nursing Notes were reviewed. REVIEW OF SYSTEMS    (2-9 systems for level 4, 10 or more for level 5)     Review of Systems   Constitutional: Positive for chills. Negative for fever. Respiratory: Positive for cough, shortness of breath and wheezing. Cardiovascular: Negative for chest pain and leg swelling. Gastrointestinal: Negative for vomiting. Genitourinary: Negative for hematuria. Musculoskeletal: Negative for joint swelling. Skin: Negative for rash. Neurological: Negative for weakness. All other systems reviewed and are negative. Except as noted above the remainder of the review of systems was reviewed and negative.        PAST MEDICAL HISTORY     Past Medical History:   Diagnosis Date    Allergic rhinitis     Anxiety disorder     Asthma, moderate persistent, poorly-controlled     intubated x 2013    Cleft lip     Divergent strabismus     Diverticulitis 2013    s/p partial colectomy, Dr Mello Standing Hyperlipidemia LDL goal <100     Hypertension     Obesity (BMI 30-39. 9)          SURGICAL HISTORY       Past Surgical History:   Procedure Laterality Date     SECTION      x 3    CLEFT LIP REPAIR      OTHER SURGICAL HISTORY  13    Exploratory laparotomy with sigmoid colectomy, drainage pelvic abscess and formation end descending colostomy    OTHER SURGICAL HISTORY  3/20/14    Exploratory lapartomy with extensived lysis of adhesions, takedown of colostmy with primary stapled anastomosis, rigid sigmoidoscopy         CURRENT MEDICATIONS       Previous Medications    ADVAIR DISKUS 250-50 MCG/DOSE AEPB    INHALE 1 PUFF BY MOUTH INTO THE LUNGS TWICE DAILY    ALBUTEROL (PROVENTIL) (2.5 MG/3ML) 0.083% NEBULIZER SOLUTION    USE 1 VIAL VIA NEBULIZER EVERY 6 HOURS AS NEEDED    ALBUTEROL SULFATE HFA (PROVENTIL HFA) 108 (90 BASE) MCG/ACT INHALER    INHALE 2 PUFFS BY MOUTH INTO LUNGS EVERY 6 HOURS AS NEEDED FOR WHEEZONG    ALPRAZOLAM (XANAX) 0.5 MG TABLET    TAKE 1 TABLET BY MOUTH DAILY AS NEEDED FOR ANXIETY    AMLODIPINE (NORVASC) 5 MG TABLET    TAKE 1 TABLET BY MOUTH EVERY DAY    BENZONATATE (TESSALON) 100 MG CAPSULE    TAKE 1 CAPSULE BY MOUTH THREE TIMES DAILY AS NEEDED FOR COUGH    BUDESONIDE (PULMICORT) 0.25 MG/2ML NEBULIZER SUSPENSION    USE 1 VIAL VIA NEBULIZER TWICE DAILY    CETIRIZINE (ZYRTEC) 10 MG TABLET    TK 1 T PO QD    FAMOTIDINE (PEPCID) 40 MG TABLET    Take 1 tablet by mouth every evening    FERROUS SULFATE (FE TABS) 325 (65 FE) MG EC TABLET    Take 1 tablet by mouth every morning (before breakfast)    FLUTICASONE (FLONASE) 50 MCG/ACT NASAL SPRAY    1 spray by Nasal route daily    MONTELUKAST (SINGULAIR) 10 MG TABLET    TAKE 1 TABLET BY MOUTH EVERY NIGHT AT BEDTIME NEOMYCIN-POLYMYXIN-HYDROCORTISONE 1 % SOLN OTIC SOLUTION    Place 4 drops into the left ear 3 times daily for 10 days. PRAVASTATIN (PRAVACHOL) 20 MG TABLET    Take 1 tablet by mouth every evening    PREDNISONE (DELTASONE) 10 MG TABLET    TAKE 2 TABLETS BY MOUTH TWICE DAILY FOR 5 DAYS    SODIUM CHLORIDE (OCEAN) 0.65 % NASAL SPRAY    1 spray by Nasal route as needed for Congestion    SPACER/AERO-HOLDING CHAMBERS (E-Z SPACER) GASTON    1 Device by Does not apply route daily as needed       ALLERGIES     Bacitracin; Butalbital-aspirin-caffeine; Codeine; Floxin [ofloxacin]; Rocephin [ceftriaxone sodium]; [place] [aspirin]; Lisinopril; and Azithromycin    FAMILY HISTORY       Family History   Problem Relation Age of Onset    Heart Failure Mother      dec age   [facility] Diabetes Mother     Cancer Sister      pancreas, dec age 40          SOCIAL HISTORY       Social History     Social History    Marital status:      Spouse name: N/A    Number of children: 1    Years of education: N/A     Occupational History    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 Main Topics    Smoking status: Never Smoker    Smokeless tobacco: Never Used    Alcohol use No    Drug use: No    Sexual activity: Not Asked     Other Topics Concern    None     Social History Narrative    Born in [facility], one of 3    Single, engaged to be  Dec 30, 2015 [facility]    3 children on [address] 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[facility] 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 and grandson    Hobbies cooking, family, bowling       [facility]    (up to 7 for level 4, 8 or more for level 5)     ED Triage Vitals [10/23/17 0847]   BP Temp Temp Source Pulse Resp SpO2 Height Weight   (!) 155/98 98.1 °F (36.7 °C) Oral 109 22 97 % 5' 5\" (1.651 m) 180 lb (81.6 kg)       Physical Exam   Constitutional: She appears well-developed and well-nourished. No distress. HENT:   Head: Normocephalic and atraumatic.    Mouth/Throat: Oropharynx is clear and moist.

## 2017-10-23 NOTE — H&P
NEEDED 7/8/17  Yes Araseli Hoffmann MD   budesonide (PULMICORT) 0.25 MG/2ML nebulizer suspension USE 1 VIAL VIA NEBULIZER TWICE DAILY 6/12/17  Yes Araseli Hoffmann MD   montelukast (SINGULAIR) 10 MG tablet TAKE 1 TABLET BY MOUTH EVERY NIGHT AT BEDTIME 6/12/17  Yes Araseli Hoffmann MD   pravastatin (PRAVACHOL) 20 MG tablet Take 1 tablet by mouth every evening 1/24/17  Yes Araseli Hoffmann MD   fluticasone (FLONASE) 50 MCG/ACT nasal spray 1 spray by Nasal route daily 10/31/16  Yes Colleen Szymanski PA-C   ADVAIR DISKUS 250-50 MCG/DOSE AEPB INHALE 1 PUFF BY MOUTH INTO THE LUNGS TWICE DAILY 8/1/16  Yes Araseli Hoffmann MD   ferrous sulfate (FE TABS) 325 (65 FE) MG EC tablet Take 1 tablet by mouth every morning (before breakfast) 11/21/15  Yes Araseli Hoffmann MD   predniSONE (DELTASONE) 10 MG tablet TAKE 2 TABLETS BY MOUTH TWICE DAILY FOR 5 DAYS 9/25/17   Araseli Hoffmann MD   cetirizine (ZYRTEC) 10 MG tablet TK 1 T PO QD 8/1/17   Historical Provider, MD   benzonatate (TESSALON) 100 MG capsule TAKE 1 CAPSULE BY MOUTH THREE TIMES DAILY AS NEEDED FOR COUGH 8/21/17   Araseli Hoffmann MD   ALPRAZolam (XANAX) 0.5 MG tablet TAKE 1 TABLET BY MOUTH DAILY AS NEEDED FOR ANXIETY 7/7/17   Araseli Hoffmann MD   famotidine (PEPCID) 40 MG tablet Take 1 tablet by mouth every evening 1/24/17   Araseli Hoffmann MD   neomycin-polymyxin-hydrocortisone 1 % SOLN otic solution Place 4 drops into the left ear 3 times daily for 10 days. 10/31/16   Merlene Duncan PA-C   Spacer/Aero-Holding Chambers (E-Z SPACER) GASTON 1 Device by Does not apply route daily as needed 7/1/15   Araseli Hoffmann MD       Allergies:  Bacitracin; Butalbital-aspirin-caffeine; Codeine; Floxin [ofloxacin]; Rocephin [ceftriaxone sodium]; Wynema Gelineau [aspirin]; Lisinopril; and Azithromycin    Social History:      The patient currently lives home    TOBACCO:   reports that she has never smoked.  She has never used smokeless tobacco.  ETOH:   reports that she does not drink alcohol. Family History:       Positive as follows:        Problem Relation Age of Onset    Heart Failure Mother      dec age   [facility] Diabetes Mother     Cancer Sister      pancreas, dec age 40       REVIEW OF SYSTEMS:   Pertinent positives as noted in the HPI. All other systems reviewed and negative. PHYSICAL EXAM:    BP (!) 129/96   Pulse 84   Temp 98.1 °F (36.7 °C) (Oral)   Resp 18   Ht 5' 5\" (1.651 m)   Wt 180 lb (81.6 kg)   LMP 10/07/2017   SpO2 100%   BMI 29.95 kg/m²     General appearance:  No apparent distress, appears stated age and cooperative. HEENT:  Normal cephalic, atraumatic without obvious deformity. Pupils equal, round, and reactive to light. Extra ocular muscles intact. Conjunctivae/corneas clear. Neck: Supple, with full range of motion. No jugular venous distention. Trachea midline. Respiratory:  Normal respiratory effort. Expiratory wheezing  Cardiovascular:  Regular rate and rhythm with normal S1/S2 without murmurs, rubs or gallops. Abdomen: Soft, non-tender, non-distended with normal bowel sounds. Musculoskeletal:  No clubbing, cyanosis or edema bilaterally. Full range of motion without deformity. Skin: Skin color, texture, turgor normal.  No rashes or lesions. Neurologic:  Neurovascularly intact without any focal sensory/motor deficits. Cranial nerves: II-XII intact, grossly non-focal.  Psychiatric:  Alert and oriented, thought content appropriate, normal insight  Capillary Refill: Brisk,< 3 seconds   Peripheral Pulses: +2 palpable, equal bilaterally       Labs:     Recent Labs      10/23/17   0930   WBC  5.0   HGB  11.2*   HCT  35.3*   PLT  375     Recent Labs      10/23/17   0930   NA  144   K  4.2   CL  105   CO2  24   BUN  8   CREATININE  0.79   CALCIUM  9.5     No results for input(s): AST, ALT, BILIDIR, BILITOT, ALKPHOS in the last 72 hours. No results for input(s): INR in the last 72 hours.   No results for input(s): Ashely Farley in the last 72

## 2017-10-23 NOTE — PLAN OF CARE
Problem: Discharge Planning:  Goal: Discharged to appropriate level of care  Discharged to appropriate level of care  Outcome: Ongoing      Problem:  Activity Intolerance:  Goal: Ability to perform activities of daily living will improve  Ability to perform activities of daily living will improve  Outcome: Ongoing      Problem: Airway Clearance - Ineffective:  Goal: Ability to maintain a clear airway will improve  Ability to maintain a clear airway will improve  Outcome: Ongoing      Problem: Gas Exchange - Impaired:  Goal: Levels of oxygenation will improve  Levels of oxygenation will improve  Outcome: Ongoing      Problem: Mood - Altered:  Goal: Emotional status will stabilize  Emotional status will stabilize  Outcome: Ongoing      Problem: Tobacco Use:  Goal: Will participate in inpatient tobacco-use cessation counseling  Will participate in inpatient tobacco-use cessation counseling  Outcome: Ongoing

## 2017-10-23 NOTE — ED NOTES
Tele placed on patient. Monitor room confirmed proper placement.       Shakira Langford RN  10/23/17 2646

## 2017-10-24 LAB
ANION GAP SERPL CALCULATED.3IONS-SCNC: 15 MEQ/L (ref 7–13)
BASOPHILS ABSOLUTE: 0 K/UL (ref 0–0.2)
BASOPHILS RELATIVE PERCENT: 0.1 %
BUN BLDV-MCNC: 11 MG/DL (ref 6–20)
CALCIUM SERPL-MCNC: 10 MG/DL (ref 8.6–10.2)
CHLORIDE BLD-SCNC: 100 MEQ/L (ref 98–107)
CO2: 24 MEQ/L (ref 22–29)
CREAT SERPL-MCNC: 0.7 MG/DL (ref 0.5–0.9)
EOSINOPHILS ABSOLUTE: 0 K/UL (ref 0–0.7)
EOSINOPHILS RELATIVE PERCENT: 0 %
GFR AFRICAN AMERICAN: >60
GFR NON-AFRICAN AMERICAN: >60
GLUCOSE BLD-MCNC: 138 MG/DL (ref 74–109)
HCT VFR BLD CALC: 35.3 % (ref 37–47)
HEMOGLOBIN: 11.1 G/DL (ref 12–16)
LYMPHOCYTES ABSOLUTE: 0.5 K/UL (ref 1–4.8)
LYMPHOCYTES RELATIVE PERCENT: 4.3 %
MCH RBC QN AUTO: 23.9 PG (ref 27–31.3)
MCHC RBC AUTO-ENTMCNC: 31.3 % (ref 33–37)
MCV RBC AUTO: 76.3 FL (ref 82–100)
MONOCYTES ABSOLUTE: 0.2 K/UL (ref 0.2–0.8)
MONOCYTES RELATIVE PERCENT: 1.6 %
NEUTROPHILS ABSOLUTE: 10.3 K/UL (ref 1.4–6.5)
NEUTROPHILS RELATIVE PERCENT: 94 %
PDW BLD-RTO: 20.7 % (ref 11.5–14.5)
PLATELET # BLD: 372 K/UL (ref 130–400)
POTASSIUM SERPL-SCNC: 4.3 MEQ/L (ref 3.5–5.1)
RBC # BLD: 4.63 M/UL (ref 4.2–5.4)
SODIUM BLD-SCNC: 139 MEQ/L (ref 132–144)
WBC # BLD: 11 K/UL (ref 4.8–10.8)

## 2017-10-24 PROCEDURE — 6360000002 HC RX W HCPCS: Performed by: PHYSICIAN ASSISTANT

## 2017-10-24 PROCEDURE — 6370000000 HC RX 637 (ALT 250 FOR IP): Performed by: PHYSICIAN ASSISTANT

## 2017-10-24 PROCEDURE — 2580000003 HC RX 258: Performed by: PHYSICIAN ASSISTANT

## 2017-10-24 PROCEDURE — 36415 COLL VENOUS BLD VENIPUNCTURE: CPT

## 2017-10-24 PROCEDURE — 6370000000 HC RX 637 (ALT 250 FOR IP): Performed by: INTERNAL MEDICINE

## 2017-10-24 PROCEDURE — 94640 AIRWAY INHALATION TREATMENT: CPT

## 2017-10-24 PROCEDURE — 99232 SBSQ HOSP IP/OBS MODERATE 35: CPT | Performed by: INTERNAL MEDICINE

## 2017-10-24 PROCEDURE — 94667 MNPJ CHEST WALL 1ST: CPT

## 2017-10-24 PROCEDURE — 1210000000 HC MED SURG R&B

## 2017-10-24 PROCEDURE — 80048 BASIC METABOLIC PNL TOTAL CA: CPT

## 2017-10-24 PROCEDURE — 96376 TX/PRO/DX INJ SAME DRUG ADON: CPT

## 2017-10-24 PROCEDURE — 85025 COMPLETE CBC W/AUTO DIFF WBC: CPT

## 2017-10-24 RX ORDER — BENZONATATE 100 MG/1
100 CAPSULE ORAL 3 TIMES DAILY PRN
Status: DISCONTINUED | OUTPATIENT
Start: 2017-10-24 | End: 2017-10-25 | Stop reason: HOSPADM

## 2017-10-24 RX ADMIN — BENZONATATE 100 MG: 100 CAPSULE ORAL at 08:14

## 2017-10-24 RX ADMIN — IPRATROPIUM BROMIDE AND ALBUTEROL SULFATE 1 AMPULE: .5; 3 SOLUTION RESPIRATORY (INHALATION) at 19:29

## 2017-10-24 RX ADMIN — ALPRAZOLAM 0.5 MG: 0.5 TABLET ORAL at 16:42

## 2017-10-24 RX ADMIN — SODIUM CHLORIDE, PRESERVATIVE FREE 10 ML: 5 INJECTION INTRAVENOUS at 20:57

## 2017-10-24 RX ADMIN — METHYLPREDNISOLONE SODIUM SUCCINATE 40 MG: 40 INJECTION, POWDER, FOR SOLUTION INTRAMUSCULAR; INTRAVENOUS at 05:16

## 2017-10-24 RX ADMIN — BENZONATATE 100 MG: 100 CAPSULE ORAL at 14:45

## 2017-10-24 RX ADMIN — IPRATROPIUM BROMIDE AND ALBUTEROL SULFATE 1 AMPULE: .5; 3 SOLUTION RESPIRATORY (INHALATION) at 14:17

## 2017-10-24 RX ADMIN — SODIUM CHLORIDE, PRESERVATIVE FREE 10 ML: 5 INJECTION INTRAVENOUS at 08:14

## 2017-10-24 RX ADMIN — AMLODIPINE BESYLATE 5 MG: 5 TABLET ORAL at 08:09

## 2017-10-24 RX ADMIN — ACETAMINOPHEN 650 MG: 325 TABLET ORAL at 02:50

## 2017-10-24 RX ADMIN — METHYLPREDNISOLONE SODIUM SUCCINATE 40 MG: 40 INJECTION, POWDER, FOR SOLUTION INTRAMUSCULAR; INTRAVENOUS at 20:57

## 2017-10-24 RX ADMIN — ENOXAPARIN SODIUM 40 MG: 40 INJECTION, SOLUTION INTRAVENOUS; SUBCUTANEOUS at 08:08

## 2017-10-24 RX ADMIN — METHYLPREDNISOLONE SODIUM SUCCINATE 40 MG: 40 INJECTION, POWDER, FOR SOLUTION INTRAMUSCULAR; INTRAVENOUS at 11:45

## 2017-10-24 RX ADMIN — IPRATROPIUM BROMIDE AND ALBUTEROL SULFATE 1 AMPULE: .5; 3 SOLUTION RESPIRATORY (INHALATION) at 09:17

## 2017-10-24 RX ADMIN — PRAVASTATIN SODIUM 20 MG: 20 TABLET ORAL at 17:04

## 2017-10-24 ASSESSMENT — PAIN SCALES - GENERAL
PAINLEVEL_OUTOF10: 0
PAINLEVEL_OUTOF10: 0
PAINLEVEL_OUTOF10: 7
PAINLEVEL_OUTOF10: 0

## 2017-10-24 NOTE — FLOWSHEET NOTE
Natali hospitalist for med rec. [id number] Hospitalist in to see patient regarding xanax. Pt stated she would be ok without it but would let this nurse know if she needed it. 7199 Pt medicated for c/o nausea, see emar. 0245 Pt c/o headache and asked for medication for cough, pt  Stated she takes tessalon pearls at home to relieve cough. Spoke with hospitalist, see orders. Pt medicated with PRN Tylenol for headache of 7/10.

## 2017-10-24 NOTE — PROGRESS NOTES
Hospitalist Progress Note      PCP: Vy Ordonez MD    Date of Admission: 10/23/2017    Subjective: I am feeling better    Medications:  Reviewed    Infusion Medications    Scheduled Medications    sodium chloride flush  10 mL Intravenous 2 times per day    enoxaparin  40 mg Subcutaneous Daily    methylPREDNISolone  40 mg Intravenous Q8H    ipratropium-albuterol  1 ampule Inhalation TID    amLODIPine  5 mg Oral Daily    pravastatin  20 mg Oral QPM     PRN Meds: benzonatate, sodium chloride flush, acetaminophen, magnesium hydroxide, ondansetron, albuterol, ALPRAZolam      Intake/Output Summary (Last 24 hours) at 10/24/17 1653  Last data filed at 10/24/17 0517   Gross per 24 hour   Intake               95 ml   Output                0 ml   Net               95 ml       Exam:  BP (!) 143/66   Pulse 89   Temp 98.1 °F (36.7 °C) (Oral)   Resp 20   Ht 5' 5\" (1.651 m)   Wt 192 lb 3.9 oz (87.2 kg)   LMP 10/07/2017   SpO2 97%   BMI 31.99 kg/m²     Average, Min, and Max for last 24 hours Vitals:  TEMPERATURE:  Temp  Av.1 °F (36.7 °C)  Min: 98.1 °F (36.7 °C)  Max: 98.1 °F (36.7 °C)    RESPIRATIONS RANGE: Resp  Av  Min: 16  Max: 20    PULSE RANGE: Pulse  Av.4  Min: 89  Max: 107    BLOOD PRESSURE RANGE:  Systolic (15GBB), XXK:074 , Min:142 , IWW:608   ; Diastolic (89WZD), XQ, Min:66, Max:83      PULSE OXIMETRY RANGE: SpO2  Av %  Min: 95 %  Max: 98 %    I/O last 3 completed shifts: In: 95 [I.V.:95]  Out: -     General appearance: No apparent distress, appears stated age and cooperative. HEENT: Pupils equal, round, and reactive to light. Conjunctivae/corneas clear. Neck: Supple, with full range of motion. No jugular venous distention. Trachea midline. Respiratory:  Normal respiratory effort. Expiratory wheezing. No Rales/Rhonchi. Cardiovascular: Regular rate and rhythm with normal S1/S2 without murmurs, rubs or gallops.   Abdomen: Soft, non-tender, non-distended with normal bowel sounds. Musculoskeletal: No clubbing, cyanosis or edema bilaterally. Full range of motion without deformity. Skin: Skin color, texture, turgor normal.  No rashes or lesions. Neurologic: II-XII intact, grossly non-focal.  Ext: no c/c/e    Labs:     Recent Results (from the past 24 hour(s))   Basic metabolic panel    Collection Time: 10/24/17  6:07 AM   Result Value Ref Range    Sodium 139 132 - 144 mEq/L    Potassium 4.3 3.5 - 5.1 mEq/L    Chloride 100 98 - 107 mEq/L    CO2 24 22 - 29 mEq/L    Anion Gap 15 (H) 7 - 13 mEq/L    Glucose 138 (H) 74 - 109 mg/dL    BUN 11 6 - 20 mg/dL    CREATININE 0.70 0.50 - 0.90 mg/dL    GFR Non-African American >60.0 >60    GFR  >60.0 >60    Calcium 10.0 8.6 - 10.2 mg/dL   CBC auto differential    Collection Time: 10/24/17  6:07 AM   Result Value Ref Range    WBC 11.0 (H) 4.8 - 10.8 K/uL    RBC 4.63 4.20 - 5.40 M/uL    Hemoglobin 11.1 (L) 12.0 - 16.0 g/dL    Hematocrit 35.3 (L) 37.0 - 47.0 %    MCV 76.3 (L) 82.0 - 100.0 fL    MCH 23.9 (L) 27.0 - 31.3 pg    MCHC 31.3 (L) 33.0 - 37.0 %    RDW 20.7 (H) 11.5 - 14.5 %    Platelets 972 234 - 742 K/uL    Neutrophils % 94.0 %    Lymphocytes % 4.3 %    Monocytes % 1.6 %    Eosinophils % 0.0 %    Basophils % 0.1 %    Neutrophils # 10.3 (H) 1.4 - 6.5 K/uL    Lymphocytes # 0.5 (L) 1.0 - 4.8 K/uL    Monocytes # 0.2 0.2 - 0.8 K/uL    Eosinophils # 0.0 0.0 - 0.7 K/uL    Basophils # 0.0 0.0 - 0.2 K/uL           Assessment/Plan:    1. Acute exacerbation of Asthma- iv steroids, acapella, mucinex, oxygen and aerosol tx. Pulmonary medicine input appreciated. 2. Hypertension controlled-continue norvasc  3. Hyperlipidemia-prvastatin 20 qhs.     Active Hospital Problems    Diagnosis Date Noted    Moderate persistent asthma with exacerbation [J45.41]     Acute bronchitis [J20.9]          DVT Prophylaxis: via lovenox  Diet: DIET GENERAL;  Code Status: Full Code          Electronically signed by Khoa Acosta MD on 10/24/2017 at 4:53

## 2017-10-24 NOTE — PROGRESS NOTES
INPATIENT PROGRESS NOTES    PATIENT NAME: Ney Velasco  MRN: [id number]  SERVICE DATE:  October 24, 2017   SERVICE TIME:  11:22 AM      PRIMARY SERVICE:  Pulmonary Medicine     INTERVAL HPI: Patient seen and examined at bedside, Interval Notes, orders reviewed. Nursing notes noted: Patient reports significant improvement since hospital admission for asthma exacerbation. She reports that she is still having some wheezing, SOB, and productive cough but that it is improving. Patient denies any chest pain or pleuritic pain. She denies any fevers chills. Review of Systems  Please see HPI above. OBJECTIVE    Body mass index is 31.99 kg/m². PHYSICAL EXAM:  Vitals:  BP (!) 143/66   Pulse 100   Temp 98.1 °F (36.7 °C) (Oral)   Resp 16   Ht 5' 5\" (1.651 m)   Wt 192 lb 3.9 oz (87.2 kg)   LMP 10/07/2017   SpO2 97%   BMI 31.99 kg/m²   General: Patient is comfortable in bed, No distress. Head: Atraumatic , Normocephalic   Eyes: PERRL. No sclera icterus. No conjunctival injection. No discharge   ENT: No nasal  discharge. Pharynx clear. Neck:  Trachea midline. No thyromegaly, no JVD, No cervical adenopathy. Resp : Normal effort,  No accessory muscle use. Expiratory wheezing present   CV: Normal  rate. Regular rhythm. No mumur ,  Rub or gallop  ABD: Non-tender. Non-distended. No masses. No organmegaly. Normal bowel sounds. No hernia. EXT: No Pitting, No Cyanosis No clubbing  Neuro: no focal weakness  Skin: Warm and dry. No erythema rash on exposed extremities.         DATA:   Recent Labs      10/23/17   0930  10/24/17   0607   WBC  5.0  11.0*   HGB  11.2*  11.1*   HCT  35.3*  35.3*   MCV  76.3*  76.3*   PLT  375  372     Recent Labs      10/23/17   0930  10/24/17   0607   NA  144  139   K  4.2  4.3   CL  105  100   CO2  24  24   BUN  8  11   CREATININE  0.79  0.70   GLUCOSE  100  138*   CALCIUM  9.5  10.0   LABGLOM  >60.0  >60.0   GFRAA  >60.0  >60.0         No results for input(s): PHART, RYL6LVY, PO2ART,

## 2017-10-25 VITALS
WEIGHT: 192.24 LBS | SYSTOLIC BLOOD PRESSURE: 135 MMHG | RESPIRATION RATE: 18 BRPM | BODY MASS INDEX: 32.03 KG/M2 | HEART RATE: 86 BPM | HEIGHT: 65 IN | TEMPERATURE: 97.5 F | OXYGEN SATURATION: 100 % | DIASTOLIC BLOOD PRESSURE: 72 MMHG

## 2017-10-25 PROCEDURE — 94640 AIRWAY INHALATION TREATMENT: CPT

## 2017-10-25 PROCEDURE — 99232 SBSQ HOSP IP/OBS MODERATE 35: CPT | Performed by: INTERNAL MEDICINE

## 2017-10-25 PROCEDURE — 6370000000 HC RX 637 (ALT 250 FOR IP): Performed by: INTERNAL MEDICINE

## 2017-10-25 PROCEDURE — 2580000003 HC RX 258: Performed by: PHYSICIAN ASSISTANT

## 2017-10-25 PROCEDURE — 6360000002 HC RX W HCPCS: Performed by: PHYSICIAN ASSISTANT

## 2017-10-25 RX ORDER — PREDNISONE 10 MG/1
10 TABLET ORAL DAILY
Qty: 15 TABLET | Refills: 0 | Status: SHIPPED | OUTPATIENT
Start: 2017-10-25 | End: 2017-11-09

## 2017-10-25 RX ADMIN — SODIUM CHLORIDE, PRESERVATIVE FREE 10 ML: 5 INJECTION INTRAVENOUS at 08:50

## 2017-10-25 RX ADMIN — AMLODIPINE BESYLATE 5 MG: 5 TABLET ORAL at 08:50

## 2017-10-25 RX ADMIN — IPRATROPIUM BROMIDE AND ALBUTEROL SULFATE 1 AMPULE: .5; 3 SOLUTION RESPIRATORY (INHALATION) at 04:53

## 2017-10-25 RX ADMIN — METHYLPREDNISOLONE SODIUM SUCCINATE 40 MG: 40 INJECTION, POWDER, FOR SOLUTION INTRAMUSCULAR; INTRAVENOUS at 04:56

## 2017-10-25 RX ADMIN — METHYLPREDNISOLONE SODIUM SUCCINATE 40 MG: 40 INJECTION, POWDER, FOR SOLUTION INTRAMUSCULAR; INTRAVENOUS at 12:03

## 2017-10-25 RX ADMIN — ENOXAPARIN SODIUM 40 MG: 40 INJECTION, SOLUTION INTRAVENOUS; SUBCUTANEOUS at 08:50

## 2017-10-25 RX ADMIN — BENZONATATE 100 MG: 100 CAPSULE ORAL at 04:56

## 2017-10-25 ASSESSMENT — PAIN SCALES - GENERAL
PAINLEVEL_OUTOF10: 0

## 2017-10-25 NOTE — PROGRESS NOTES
sounds. Musculoskeletal: No clubbing, cyanosis or edema bilaterally. Full range of motion without deformity. Skin: Skin color, texture, turgor normal.  No rashes or lesions. Neurologic:grossly non-focal.  Ext: no c/c/e    Labs:     No results found for this or any previous visit (from the past 24 hour(s)). Assessment/Plan:    1. Acute exacerbation of Asthma- doing well . Dc pt home. 2. Hypertension controlled-continue norvasc  3. Hyperlipidemia-prvastatin 20 qhs.     Active Hospital Problems    Diagnosis Date Noted    Moderate persistent asthma with exacerbation [J45.41]     Acute bronchitis [J20.9]          DVT Prophylaxis: via lovenox  Diet: DIET GENERAL;  Code Status: Full Code          Electronically signed by Aashish Smith MD on 10/25/2017 at 2:15 PM

## 2017-10-25 NOTE — CARE COORDINATION
10/25/17 I MET WITH THE PT. SHE STATED TO ME SHE IS READY FOR DISCHARGE TODAY. SHE DENIES ANY NEEDS. PT HAS HER COAT ON WAITING FOR TRANSPORTER AT HER ROOM DOOR.

## 2017-10-25 NOTE — DISCHARGE INSTR - DIET

## 2017-10-25 NOTE — PROGRESS NOTES
None (Room air)  Lab Results   Component Value Date    LACTGAMAL 1.3 12/27/2013        MEDICATIONS during current hospitalization:    Continuous Infusions:     Scheduled Meds:   sodium chloride flush  10 mL Intravenous 2 times per day    enoxaparin  40 mg Subcutaneous Daily    methylPREDNISolone  40 mg Intravenous Q8H    ipratropium-albuterol  1 ampule Inhalation TID    amLODIPine  5 mg Oral Daily    pravastatin  20 mg Oral QPM       PRN Meds:benzonatate, sodium chloride flush, acetaminophen, magnesium hydroxide, ondansetron, albuterol, ALPRAZolam    Radiology  Xr Chest Standard (2 Vw)    Result Date: 10/23/2017  EXAMINATION: XR CHEST STANDARD TWO VW, 10/23/2017 9:00 AM History: 49-year-old female, shortness of breath for one week COMPARISON:  August 1, 2017 FINDINGS: The lungs are clear. The costophrenic angles are clear. The cardiac silhouette is normal in size. The pulmonary vascularity is normal size. The osseous structures are unremarkable. There are surgical clips in the left upper quadrant. Unremarkable chest radiograph. ASSESSMENT /Plan:  1. Acute asthma exacerbation secondary to bronchitis, improved  2. Hypertension  3. Obesity  4. Hyperlipidemia  5. Allergic rhinitis  6. Anxiety disorder    Okay to discharge and taper as of prednisone. She will have a Breo Ellipta ellipta 1 puff daily in the morning and my office will give a sample. She has nebulizer with albuterol at home and albuterol inhaler for when necessary use.   Follow-up in office 4 weeks      Electronically signed by [id number] 179Mikey Hassan Se, MD on 10/25/2017 at 11:32 AM

## 2017-10-27 ENCOUNTER — TELEPHONE (OUTPATIENT)
Dept: SURGERY | Age: 52
End: 2017-10-27

## 2017-10-27 NOTE — TELEPHONE ENCOUNTER
Attempted to contact patient for HOSP follow up. Pt was admitted to [facility] from 10-23-17 for moderately persistent asthma with exacerbation. Called Pt to Schedule a [facility] Appt with Ramana Sanders MD , Review Discharge Instructions, and Reconcile Medications. Unable to reach patient by phone. Message left regarding the purpose of this call. Number provided and call back requested.

## 2017-10-28 NOTE — DISCHARGE SUMMARY
Discharge Summary Note  Patient ID:  Carlos Jenkins  [id number]  40 y.o.  1965    Admit date: 10/23/2017    Discharge date and time: 10/25/2017 12:45 PM     Admitting Physician: Beverly García MD     Discharge Physician: Beverly García MD    Admission Diagnoses:   Asthma exacerbation attacks [J45.901]  Status asthmaticus [J45.902]    Discharge Diagnoses: the same as the admitting diagnosis but also includes:  1. Hypertension  2. hyperlipidemia    Admission Condition: poor    Discharged Condition: good    Hospital Course: 51-year-old female admitted with an acute asthma exacerbation. The patient responded well to IV steroids and bronchodilator therapy. She was discharged home in stable condition    Consults: pulmonary/intensive care    Significant Diagnostic Studies: chest x-ray obtained on October 23, 2017 revealed: no cardiopulmonary pathology.     Treatments: bronchodilators, iv steroids    Discharge Exam:  As for progress on the day of discharge    Disposition: home    Patient Instructions:     Discharge Medications:   [place], [address] [address] Medication Instructions [facility]:[phone]    Printed on:10/27/17 8325   Medication Information                      ADVAIR DISKUS 250-50 MCG/DOSE AEPB  INHALE 1 PUFF BY MOUTH INTO THE LUNGS TWICE DAILY             albuterol (PROVENTIL) (2.5 MG/3ML) 0.083% nebulizer solution  USE 1 VIAL VIA NEBULIZER EVERY 6 HOURS AS NEEDED             albuterol sulfate HFA (PROVENTIL HFA) 108 (90 Base) MCG/ACT inhaler  INHALE 2 PUFFS BY MOUTH INTO LUNGS EVERY 6 HOURS AS NEEDED FOR WHEEZONG             ALPRAZolam (XANAX) 0.5 MG tablet  TAKE 1 TABLET BY MOUTH DAILY AS NEEDED FOR ANXIETY             ALPRAZolam (XANAX) 0.5 MG tablet  Take 0.5 mg by mouth 3 times daily as needed for Sleep             amLODIPine (NORVASC) 5 MG tablet  TAKE 1 TABLET BY MOUTH EVERY DAY             benzonatate (TESSALON) 100 MG capsule  TAKE 1 CAPSULE BY MOUTH THREE TIMES DAILY AS NEEDED FOR COUGH budesonide (PULMICORT) 0.25 MG/2ML nebulizer suspension  USE 1 VIAL VIA NEBULIZER TWICE DAILY             cetirizine (ZYRTEC) 10 MG tablet  TK 1 T PO QD             famotidine (PEPCID) 40 MG tablet  Take 1 tablet by mouth every evening             ferrous sulfate (FE TABS) 325 (65 FE) MG EC tablet  Take 1 tablet by mouth every morning (before breakfast)             fluticasone (FLONASE) 50 MCG/ACT nasal spray  1 spray by Nasal route daily             montelukast (SINGULAIR) 10 MG tablet  TAKE 1 TABLET BY MOUTH EVERY NIGHT AT BEDTIME             neomycin-polymyxin-hydrocortisone 1 % SOLN otic solution  Place 4 drops into the left ear 3 times daily for 10 days. pravastatin (PRAVACHOL) 20 MG tablet  Take 1 tablet by mouth every evening             predniSONE (DELTASONE) 10 MG tablet  Take 1 tablet by mouth daily for 15 days Take 4 tabs po daily for 2 days then 2 tabs po daily for 2 days then 1 tab po daily for 2 days then 0.5 tabs po daily for 2 days then stop.             sodium chloride (OCEAN) 0.65 % nasal spray  1 spray by Nasal route as needed for Congestion             Spacer/Aero-Holding Chambers (E-Z SPACER) GASTON  1 Device by Does not apply route daily as needed                 Activity: activity as tolerated    Diet: 2 g sodium.     Wound Care: none needed    Follow-up:  As directed    Electronically signed by Oumou Bunch MD on 10/27/17 at 11:42 PM

## 2017-11-27 ENCOUNTER — OFFICE VISIT (OUTPATIENT)
Dept: INTERNAL MEDICINE | Age: 52
End: 2017-11-27

## 2017-11-27 VITALS
WEIGHT: 192 LBS | HEART RATE: 97 BPM | BODY MASS INDEX: 31.99 KG/M2 | SYSTOLIC BLOOD PRESSURE: 120 MMHG | OXYGEN SATURATION: 98 % | TEMPERATURE: 96.9 F | DIASTOLIC BLOOD PRESSURE: 80 MMHG | HEIGHT: 65 IN

## 2017-11-27 DIAGNOSIS — Z02.89 ENCOUNTER FOR PHYSICAL EXAMINATION RELATED TO EMPLOYMENT: Primary | ICD-10-CM

## 2017-11-27 DIAGNOSIS — Z02.89 ENCOUNTER FOR PHYSICAL EXAMINATION RELATED TO EMPLOYMENT: ICD-10-CM

## 2017-11-27 DIAGNOSIS — N30.00 ACUTE CYSTITIS WITHOUT HEMATURIA: ICD-10-CM

## 2017-11-27 LAB
CHOLESTEROL, TOTAL: 240 MG/DL (ref 0–199)
GLUCOSE FASTING: 72 MG/DL (ref 74–109)
HBA1C MFR BLD: 5.7 % (ref 4.8–5.9)
HDLC SERPL-MCNC: 76 MG/DL (ref 40–59)
LDL CHOLESTEROL CALCULATED: 152 MG/DL (ref 0–129)
TRIGL SERPL-MCNC: 61 MG/DL (ref 0–200)

## 2017-11-27 PROCEDURE — G8427 DOCREV CUR MEDS BY ELIG CLIN: HCPCS | Performed by: INTERNAL MEDICINE

## 2017-11-27 PROCEDURE — 3017F COLORECTAL CA SCREEN DOC REV: CPT | Performed by: INTERNAL MEDICINE

## 2017-11-27 PROCEDURE — 1036F TOBACCO NON-USER: CPT | Performed by: INTERNAL MEDICINE

## 2017-11-27 PROCEDURE — G8484 FLU IMMUNIZE NO ADMIN: HCPCS | Performed by: INTERNAL MEDICINE

## 2017-11-27 PROCEDURE — 3014F SCREEN MAMMO DOC REV: CPT | Performed by: INTERNAL MEDICINE

## 2017-11-27 PROCEDURE — G8417 CALC BMI ABV UP PARAM F/U: HCPCS | Performed by: INTERNAL MEDICINE

## 2017-11-27 PROCEDURE — 99213 OFFICE O/P EST LOW 20 MIN: CPT | Performed by: INTERNAL MEDICINE

## 2017-11-27 RX ORDER — AMOXICILLIN 250 MG
2 CAPSULE ORAL DAILY PRN
Qty: 30 TABLET | Refills: 3 | Status: SHIPPED | OUTPATIENT
Start: 2017-11-27 | End: 2019-12-13 | Stop reason: SDUPTHER

## 2017-11-27 RX ORDER — NITROFURANTOIN 25; 75 MG/1; MG/1
100 CAPSULE ORAL 2 TIMES DAILY
Qty: 10 CAPSULE | Refills: 1 | Status: SHIPPED | OUTPATIENT
Start: 2017-11-27 | End: 2018-05-26 | Stop reason: SDUPTHER

## 2017-11-28 RX ORDER — PRAVASTATIN SODIUM 20 MG
20 TABLET ORAL EVERY EVENING
Qty: 30 TABLET | Refills: 5 | Status: SHIPPED | OUTPATIENT
Start: 2017-11-28 | End: 2019-05-17 | Stop reason: SDUPTHER

## 2017-11-29 ASSESSMENT — ENCOUNTER SYMPTOMS
ABDOMINAL PAIN: 0
PHOTOPHOBIA: 0
BLOOD IN STOOL: 0
BACK PAIN: 0
NAUSEA: 0
CHEST TIGHTNESS: 0
TROUBLE SWALLOWING: 0
COUGH: 0
RESPIRATORY NEGATIVE: 1
GASTROINTESTINAL NEGATIVE: 1
EYE PAIN: 0
SHORTNESS OF BREATH: 0

## 2017-11-30 RX ORDER — ALBUTEROL SULFATE 2.5 MG/3ML
SOLUTION RESPIRATORY (INHALATION)
Qty: 450 ML | Refills: 1 | Status: SHIPPED | OUTPATIENT
Start: 2017-11-30 | End: 2018-04-30 | Stop reason: SDUPTHER

## 2017-11-30 NOTE — PROGRESS NOTES
Lymphocytes % 10/24/2017 4.3  % Final    Monocytes % 10/24/2017 1.6  % Final    Eosinophils % 10/24/2017 0.0  % Final    Basophils % 10/24/2017 0.1  % Final    Neutrophils # 10/24/2017 10.3* 1.4 - 6.5 K/uL Final    Lymphocytes # 10/24/2017 0.5* 1.0 - 4.8 K/uL Final    Monocytes # 10/24/2017 0.2  0.2 - 0.8 K/uL Final    Eosinophils # 10/24/2017 0.0  0.0 - 0.7 K/uL Final    Basophils # 10/24/2017 0.0  0.0 - 0.2 K/uL Final       HPI:    Urinary Tract Infection    This is a new problem. The current episode started in the past 7 days. The problem occurs every urination. The problem has been unchanged. The quality of the pain is described as burning. The pain is moderate. There has been no fever. Associated symptoms include frequency and urgency. Pertinent negatives include no chills, flank pain, hematuria or nausea. She has tried increased fluids for the symptoms. The treatment provided no relief. There is no history of kidney stones, recurrent UTIs or a urological procedure. The patient presents today for physical exam related to employment. More detail above in the chief complaint(s) and below in the review of systems. Past Medical History:   Diagnosis Date    Allergic rhinitis     Anxiety disorder     Asthma, moderate persistent, poorly-controlled     intubated x 2013    Cleft lip     Divergent strabismus     Diverticulitis 2013    s/p partial colectomy, Dr Gregg Magdaleno Hyperlipidemia LDL goal <100     Hypertension     Obesity (BMI 30-39. 9)        Past Surgical History:   Procedure Laterality Date     SECTION      x 3    CLEFT LIP REPAIR      OTHER SURGICAL HISTORY  13    Exploratory laparotomy with sigmoid colectomy, drainage pelvic abscess and formation end descending colostomy    OTHER SURGICAL HISTORY  3/20/14    Exploratory lapartomy with extensived lysis of adhesions, takedown of colostmy with primary stapled anastomosis, rigid sigmoidoscopy       Social Number of Occurrences:   1     Standing Expiration Date:   11/27/2018    Hemoglobin A1C     Standing Status:   Future     Number of Occurrences:   1     Standing Expiration Date:   11/27/2018       Close follow up needed to evaluate treatment results and for care coordination. Return in about 2 months (around 1/27/2018). I have reviewed the patient's medical and surgical, family and social history, health maintenance schedule, and updated the computerized patient record. Please note this report has been partially produced by using speech recognition hardware. It may contain errors related to the system, including grammar, punctuation and spelling as well as words and phrases that may seem inaccurate. For any questions or concerns, please feel free to contact me for clarification.         Electronically signed by Mariana Castañeda MD

## 2017-12-04 ENCOUNTER — TELEPHONE (OUTPATIENT)
Dept: INTERNAL MEDICINE | Age: 52
End: 2017-12-04

## 2017-12-04 RX ORDER — PREDNISONE 20 MG/1
20 TABLET ORAL 2 TIMES DAILY
Qty: 10 TABLET | Refills: 1 | Status: SHIPPED | OUTPATIENT
Start: 2017-12-04 | End: 2017-12-09

## 2017-12-29 RX ORDER — BENZONATATE 100 MG/1
CAPSULE ORAL
Qty: 30 CAPSULE | Refills: 0 | Status: SHIPPED | OUTPATIENT
Start: 2017-12-29 | End: 2018-02-06

## 2018-02-04 RX ORDER — PREDNISONE 10 MG/1
TABLET ORAL
Qty: 20 TABLET | Refills: 0 | Status: SHIPPED | OUTPATIENT
Start: 2018-02-04 | End: 2018-02-26 | Stop reason: SDUPTHER

## 2018-02-05 ENCOUNTER — TELEPHONE (OUTPATIENT)
Dept: ENDOCRINOLOGY | Age: 53
End: 2018-02-05

## 2018-02-06 RX ORDER — DEXTROMETHORPHAN HYDROBROMIDE AND PROMETHAZINE HYDROCHLORIDE 15; 6.25 MG/5ML; MG/5ML
5 SYRUP ORAL 3 TIMES DAILY PRN
Qty: 180 ML | Refills: 0 | Status: SHIPPED | OUTPATIENT
Start: 2018-02-06 | End: 2020-10-19 | Stop reason: SDUPTHER

## 2018-02-26 RX ORDER — PREDNISONE 10 MG/1
TABLET ORAL
Qty: 20 TABLET | Refills: 0 | Status: SHIPPED | OUTPATIENT
Start: 2018-02-26 | End: 2018-04-02 | Stop reason: SDUPTHER

## 2018-04-30 RX ORDER — BENZONATATE 100 MG/1
CAPSULE ORAL
Qty: 30 CAPSULE | Refills: 0 | Status: SHIPPED | OUTPATIENT
Start: 2018-04-30 | End: 2018-05-30 | Stop reason: SDUPTHER

## 2018-04-30 RX ORDER — MONTELUKAST SODIUM 10 MG/1
TABLET ORAL
Qty: 90 TABLET | Refills: 0 | Status: SHIPPED | OUTPATIENT
Start: 2018-04-30 | End: 2018-07-29 | Stop reason: SDUPTHER

## 2018-04-30 RX ORDER — ALBUTEROL SULFATE 2.5 MG/3ML
SOLUTION RESPIRATORY (INHALATION)
Qty: 450 ML | Refills: 0 | Status: SHIPPED | OUTPATIENT
Start: 2018-04-30 | End: 2018-07-29 | Stop reason: SDUPTHER

## 2018-04-30 RX ORDER — PREDNISONE 10 MG/1
TABLET ORAL
Qty: 20 TABLET | Refills: 0 | Status: SHIPPED | OUTPATIENT
Start: 2018-04-30 | End: 2018-05-30 | Stop reason: SDUPTHER

## 2018-04-30 RX ORDER — ALPRAZOLAM 0.5 MG/1
0.5 TABLET ORAL DAILY PRN
Qty: 30 TABLET | Refills: 0 | Status: SHIPPED | OUTPATIENT
Start: 2018-04-30 | End: 2018-05-30

## 2018-05-29 RX ORDER — NITROFURANTOIN 25; 75 MG/1; MG/1
CAPSULE ORAL
Qty: 10 CAPSULE | Refills: 0 | Status: SHIPPED | OUTPATIENT
Start: 2018-05-29 | End: 2018-06-28

## 2018-05-30 RX ORDER — PREDNISONE 10 MG/1
TABLET ORAL
Qty: 20 TABLET | Refills: 0 | Status: SHIPPED | OUTPATIENT
Start: 2018-05-30 | End: 2018-10-15

## 2018-05-30 RX ORDER — BENZONATATE 100 MG/1
CAPSULE ORAL
Qty: 30 CAPSULE | Refills: 0 | Status: SHIPPED | OUTPATIENT
Start: 2018-05-30 | End: 2018-10-15 | Stop reason: SDUPTHER

## 2018-06-28 ENCOUNTER — OFFICE VISIT (OUTPATIENT)
Dept: INTERNAL MEDICINE CLINIC | Age: 53
End: 2018-06-28
Payer: COMMERCIAL

## 2018-06-28 VITALS
TEMPERATURE: 96.7 F | BODY MASS INDEX: 32.32 KG/M2 | HEART RATE: 70 BPM | WEIGHT: 194 LBS | HEIGHT: 65 IN | OXYGEN SATURATION: 98 % | SYSTOLIC BLOOD PRESSURE: 120 MMHG | DIASTOLIC BLOOD PRESSURE: 80 MMHG

## 2018-06-28 DIAGNOSIS — R35.0 FREQUENCY OF URINATION: ICD-10-CM

## 2018-06-28 DIAGNOSIS — R30.0 DYSURIA: Primary | ICD-10-CM

## 2018-06-28 DIAGNOSIS — G89.29 CHRONIC LEFT-SIDED LOW BACK PAIN WITHOUT SCIATICA: ICD-10-CM

## 2018-06-28 DIAGNOSIS — M54.50 CHRONIC LEFT-SIDED LOW BACK PAIN WITHOUT SCIATICA: ICD-10-CM

## 2018-06-28 PROCEDURE — 99212 OFFICE O/P EST SF 10 MIN: CPT | Performed by: INTERNAL MEDICINE

## 2018-06-28 RX ORDER — TIZANIDINE 4 MG/1
4 TABLET ORAL EVERY 8 HOURS PRN
Qty: 30 TABLET | Refills: 1 | Status: SHIPPED | OUTPATIENT
Start: 2018-06-28 | End: 2018-07-08

## 2018-06-29 DIAGNOSIS — R35.0 FREQUENCY OF URINATION: ICD-10-CM

## 2018-06-29 DIAGNOSIS — R30.0 DYSURIA: ICD-10-CM

## 2018-06-29 LAB
BILIRUBIN URINE: NEGATIVE
BLOOD, URINE: NEGATIVE
CLARITY: ABNORMAL
COLOR: YELLOW
GLUCOSE URINE: NEGATIVE MG/DL
KETONES, URINE: NEGATIVE MG/DL
LEUKOCYTE ESTERASE, URINE: NEGATIVE
NITRITE, URINE: NEGATIVE
PH UA: 6 (ref 5–9)
PROTEIN UA: NEGATIVE MG/DL
SPECIFIC GRAVITY UA: 1.02 (ref 1–1.03)
UROBILINOGEN, URINE: 1 E.U./DL

## 2018-06-30 LAB — URINE CULTURE, ROUTINE: NORMAL

## 2018-07-01 ASSESSMENT — ENCOUNTER SYMPTOMS
COUGH: 0
TROUBLE SWALLOWING: 0
NAUSEA: 0
ABDOMINAL PAIN: 0
CHEST TIGHTNESS: 0
EYE PAIN: 0
PHOTOPHOBIA: 0
BLOOD IN STOOL: 0
BACK PAIN: 1
SHORTNESS OF BREATH: 0

## 2018-07-01 NOTE — PROGRESS NOTES
Mely Pugh is a 46 y.o. female nonsmoker, with asthma, anxiety disorder, obesity, who presents with     Chief Complaint   Patient presents with    Dysuria    Back Pain     muscle spasms, left sided, no fall or other recent trauma       Interim history: Since past office visit 6 months ago, the patient has not been in the emergency room or hospital.  She called the office numerous times for medication refills. She complains of lack of medical insurance, cost of medications. Overall, she feels fairly well, has been working in nursing homes on and off. The following laboratory reports since the past visit were reviewed (the ones pertinent to today's visit were discussed with the patient):    No visits with results within 3 Month(s) from this visit. Latest known visit with results is:   Orders Only on 11/27/2017   Component Date Value Ref Range Status    Cholesterol, Total 11/27/2017 240* 0 - 199 mg/dL Final    ATP III Cholesterol Classification is High.  Triglycerides 11/27/2017 61  0 - 200 mg/dL Final    ATP III Triglycerides Classification is Normal.    HDL 11/27/2017 76* 40 - 59 mg/dL Final    Comment: ATP III HDL Cholesterol Classification is high. Expected Values:    Males:    >55 = No Risk            35-55 = Moderate Risk            <35 = High Risk    Females:  >65 = No Risk            45-65 = Moderate Risk            <45 = High Risk    NCEP Guidelines: Third Report May 2001  >59 = negative risk factor for CHD  <40 = major risk factor for CHD      LDL Calculated 11/27/2017 152* 0 - 129 mg/dL Final    ATT III Classification is Borderline High.  Glucose, Fasting 11/27/2017 72* 74 - 109 mg/dL Final    Hemoglobin A1C 11/27/2017 5.7  4.8 - 5.9 % Final       HPI:    Dysuria    This is a recurrent problem. The current episode started more than 1 month ago. The problem occurs intermittently. The problem has been waxing and waning. The quality of the pain is described as burning.  The pain is moderate. There has been no fever. She is sexually active. There is no history of pyelonephritis. Associated symptoms include frequency and urgency. Pertinent negatives include no chills, flank pain, hematuria or nausea. She has tried increased fluids and antibiotics for the symptoms. The treatment provided no relief. There is no history of kidney stones, recurrent UTIs or urinary stasis. Back Pain   This is a recurrent problem. The current episode started 1 to 4 weeks ago. The problem occurs daily. The problem is unchanged. The pain is present in the lumbar spine. The quality of the pain is described as aching and cramping. The pain does not radiate. The pain is severe. The pain is worse during the day. The symptoms are aggravated by bending, standing, sitting and twisting. Associated symptoms include dysuria. Pertinent negatives include no abdominal pain, chest pain, numbness, pelvic pain, tingling or weakness. Risk factors include sedentary lifestyle, obesity and history of steroid use. She has tried home exercises, bed rest and muscle relaxant for the symptoms. The treatment provided mild relief. More detail above in the chief complaint(s), interim history and below in the review of systems. Past Medical History:   Diagnosis Date    Allergic rhinitis     Anxiety disorder     Asthma, moderate persistent, poorly-controlled     intubated x 2013    Cleft lip     Divergent strabismus     Diverticulitis 2013    s/p partial colectomy, Dr Alexandra Pollack Hyperlipidemia LDL goal <100     Hypertension     Obesity (BMI 30-39. 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 Main Topics    Smoking status: Never Smoker    Smokeless tobacco: Never Used    Alcohol use No    Drug use: No    Sexual activity: Not on file     Other Topics Concern    Not on file     Social History Narrative    Born in [facility], one of 3     Dec 30, 2015 to a [place]    3 children on [facility]    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 ALLERGIES    Bacitracin; Butalbital-aspirin-caffeine; Codeine; Floxin [ofloxacin]; Rocephin [ceftriaxone sodium]; Tomato; Asa [aspirin];  Lisinopril; and Azithromycin    Current Outpatient Prescriptions on File Prior to Visit   Medication Sig Dispense Refill    ADVAIR DISKUS 250-50 MCG/DOSE AEPB INHALE 1 PUFF BY MOUTH INTO THE LUNGS TWICE DAILY 1 Inhaler 3    benzonatate (TESSALON) 100 MG capsule TAKE 1 CAPSULE BY MOUTH THREE TIMES DAILY AS NEEDED FOR COUGH 30 capsule 0    predniSONE (DELTASONE) 10 MG tablet TAKE 2 TABLETS BY MOUTH TWICE DAILY FOR 5 DAYS 20 tablet 0    montelukast (SINGULAIR) 10 MG tablet TAKE 1 TABLET BY MOUTH EVERY NIGHT AT BEDTIME 90 tablet 0    albuterol (PROVENTIL) (2.5 MG/3ML) 0.083% nebulizer solution USE 1 VIAL VIA NEBULIZER EVERY 6 HOURS AS NEEDED 450 mL 0    amLODIPine (NORVASC) 5 MG tablet TAKE 1 TABLET BY MOUTH EVERY DAY 30 tablet 3    pravastatin (PRAVACHOL) 20 MG tablet Take 1 tablet by mouth every evening 30 tablet 5    senna-docusate light-headedness and numbness. Hematological: Does not bruise/bleed easily. Psychiatric/Behavioral: Negative for confusion, decreased concentration, dysphoric mood and sleep disturbance. The patient is not nervous/anxious. Vitals:    06/28/18 1733   BP: 120/80   Site: Right Arm   Position: Sitting   Cuff Size: Medium Adult   Pulse: 70   Temp: 96.7 °F (35.9 °C)   TempSrc: Tympanic   SpO2: 98%   Weight: 194 lb (88 kg)   Height: 5' 5\" (1.651 m)       Physical Exam   Constitutional: She is oriented to person, place, and time. She appears distressed (anxious due to th eback pain). Obese, age appropriate, well groomed     HENT:   Head: Atraumatic. Eyes: Conjunctivae are normal.   Neck: Neck supple. No thyromegaly present. Cardiovascular: Regular rhythm, normal heart sounds and intact distal pulses. Pulmonary/Chest: Effort normal and breath sounds normal. She has no wheezes. Abdominal: Soft. Normal appearance. She exhibits no distension. There is no tenderness. There is no CVA tenderness. Musculoskeletal:        Thoracic back: She exhibits decreased range of motion. She exhibits no tenderness. Lumbar back: She exhibits decreased range of motion, tenderness and spasm (left paravertebral lumbar muscles). Neurological: She is alert and oriented to person, place, and time. She has normal reflexes. Coordination normal.   Skin: Skin is warm and dry. Psychiatric: Her behavior is normal. Judgment normal. Her mood appears anxious. Her speech is rapid and/or pressured. She is not slowed and not withdrawn. Cognition and memory are normal. She does not express inappropriate judgment. She does not exhibit a depressed mood. She exhibits normal recent memory and normal remote memory. Insight and motivation appear good She is attentive. Assessment:    Don Mcdonald was seen today for dysuria and back pain. Diagnoses and all orders for this visit:    Dysuria  -     Urine Culture;  Future  - Status:   Future     Number of Occurrences:   1     Standing Expiration Date:   6/28/2019     Order Specific Question:   SPECIFY(EX-CATH,MIDSTREAM,CYSTO,ETC)? Answer:   Midstream   Further workup and plan will be determined based on clinical progression and follow up test/ treatment results. Close follow up needed to evaluate treatment results and for care coordination. Return if symptoms worsen or fail to improve. I have reviewed the patient's medical and surgical, family and social history, health maintenance schedule, and updated the computerized patient record. Please note this report has been partially produced by using speech recognition hardware. It may contain errors related to the system, including grammar, punctuation and spelling as well as words and phrases that may seem inaccurate. For any questions or concerns, please feel free to contact me for clarification.         Electronically signed by Michaelle Perry MD

## 2018-07-17 ENCOUNTER — TELEPHONE (OUTPATIENT)
Dept: INTERNAL MEDICINE CLINIC | Age: 53
End: 2018-07-17

## 2018-07-17 DIAGNOSIS — Z12.11 SCREENING FOR COLON CANCER: ICD-10-CM

## 2018-07-17 DIAGNOSIS — Z12.39 BREAST CANCER SCREENING: Primary | ICD-10-CM

## 2018-07-24 ENCOUNTER — TELEPHONE (OUTPATIENT)
Dept: PEDIATRICS CLINIC | Age: 53
End: 2018-07-24

## 2018-07-24 NOTE — TELEPHONE ENCOUNTER
Pt called in stating that she still has a UTI symptoms are not improving and that the infection is now in her kidneys pt also states that she would like an antibiotic sent over to her pharmacy.

## 2018-07-31 RX ORDER — AMLODIPINE BESYLATE 5 MG/1
TABLET ORAL
Qty: 30 TABLET | Refills: 5 | Status: SHIPPED | OUTPATIENT
Start: 2018-07-31 | End: 2019-05-17 | Stop reason: SDUPTHER

## 2018-08-19 ENCOUNTER — APPOINTMENT (OUTPATIENT)
Dept: GENERAL RADIOLOGY | Age: 53
End: 2018-08-19

## 2018-08-19 ENCOUNTER — HOSPITAL ENCOUNTER (EMERGENCY)
Age: 53
Discharge: HOME | End: 2018-08-19
Attending: EMERGENCY MEDICINE

## 2018-08-19 VITALS
BODY MASS INDEX: 31.65 KG/M2 | TEMPERATURE: 98.6 F | DIASTOLIC BLOOD PRESSURE: 94 MMHG | HEART RATE: 103 BPM | RESPIRATION RATE: 16 BRPM | WEIGHT: 190 LBS | OXYGEN SATURATION: 96 % | SYSTOLIC BLOOD PRESSURE: 131 MMHG | HEIGHT: 65 IN

## 2018-08-19 DIAGNOSIS — N30.00 ACUTE CYSTITIS WITHOUT HEMATURIA: ICD-10-CM

## 2018-08-19 DIAGNOSIS — J45.21 EXACERBATION OF INTERMITTENT ASTHMA, UNSPECIFIED ASTHMA SEVERITY: Primary | ICD-10-CM

## 2018-08-19 LAB
ALBUMIN SERPL-MCNC: 4.2 G/DL (ref 3.9–4.9)
ALP BLD-CCNC: 73 U/L (ref 40–130)
ALT SERPL-CCNC: 12 U/L (ref 0–33)
ANION GAP SERPL CALCULATED.3IONS-SCNC: 12 MEQ/L (ref 7–13)
AST SERPL-CCNC: 14 U/L (ref 0–35)
BACTERIA: ABNORMAL /HPF
BASOPHILS ABSOLUTE: 0 K/UL (ref 0–0.2)
BASOPHILS RELATIVE PERCENT: 0.8 %
BILIRUB SERPL-MCNC: 1.2 MG/DL (ref 0–1.2)
BILIRUBIN URINE: NEGATIVE
BLOOD, URINE: NEGATIVE
BUN BLDV-MCNC: 10 MG/DL (ref 6–20)
CALCIUM SERPL-MCNC: 9.1 MG/DL (ref 8.6–10.2)
CHLORIDE BLD-SCNC: 102 MEQ/L (ref 98–107)
CLARITY: ABNORMAL
CO2: 25 MEQ/L (ref 22–29)
COLOR: YELLOW
CREAT SERPL-MCNC: 0.74 MG/DL (ref 0.5–0.9)
EOSINOPHILS ABSOLUTE: 1.1 K/UL (ref 0–0.7)
EOSINOPHILS RELATIVE PERCENT: 20.7 %
EPITHELIAL CELLS, UA: ABNORMAL /HPF
GFR AFRICAN AMERICAN: >60
GFR NON-AFRICAN AMERICAN: >60
GLOBULIN: 3 G/DL (ref 2.3–3.5)
GLUCOSE BLD-MCNC: 99 MG/DL (ref 74–109)
GLUCOSE URINE: NEGATIVE MG/DL
HCT VFR BLD CALC: 39.7 % (ref 37–47)
HEMOGLOBIN: 13.1 G/DL (ref 12–16)
KETONES, URINE: NEGATIVE MG/DL
LEUKOCYTE ESTERASE, URINE: ABNORMAL
LYMPHOCYTES ABSOLUTE: 1 K/UL (ref 1–4.8)
LYMPHOCYTES RELATIVE PERCENT: 17.8 %
MCH RBC QN AUTO: 27.6 PG (ref 27–31.3)
MCHC RBC AUTO-ENTMCNC: 33.1 % (ref 33–37)
MCV RBC AUTO: 83.6 FL (ref 82–100)
MONOCYTES ABSOLUTE: 0.3 K/UL (ref 0.2–0.8)
MONOCYTES RELATIVE PERCENT: 6.3 %
NEUTROPHILS ABSOLUTE: 3 K/UL (ref 1.4–6.5)
NEUTROPHILS RELATIVE PERCENT: 54.4 %
NITRITE, URINE: POSITIVE
PDW BLD-RTO: 16.6 % (ref 11.5–14.5)
PH UA: 7 (ref 5–9)
PLATELET # BLD: 343 K/UL (ref 130–400)
POTASSIUM SERPL-SCNC: 3.9 MEQ/L (ref 3.5–5.1)
PROTEIN UA: NEGATIVE MG/DL
RBC # BLD: 4.75 M/UL (ref 4.2–5.4)
RBC UA: ABNORMAL /HPF (ref 0–2)
SODIUM BLD-SCNC: 139 MEQ/L (ref 132–144)
SPECIFIC GRAVITY UA: 1.01 (ref 1–1.03)
TOTAL PROTEIN: 7.2 G/DL (ref 6.4–8.1)
URINE REFLEX TO CULTURE: YES
UROBILINOGEN, URINE: 1 E.U./DL
WBC # BLD: 5.5 K/UL (ref 4.8–10.8)
WBC UA: ABNORMAL /HPF (ref 0–5)

## 2018-08-19 PROCEDURE — 6370000000 HC RX 637 (ALT 250 FOR IP): Performed by: EMERGENCY MEDICINE

## 2018-08-19 PROCEDURE — 96375 TX/PRO/DX INJ NEW DRUG ADDON: CPT

## 2018-08-19 PROCEDURE — 96366 THER/PROPH/DIAG IV INF ADDON: CPT

## 2018-08-19 PROCEDURE — 36415 COLL VENOUS BLD VENIPUNCTURE: CPT

## 2018-08-19 PROCEDURE — 94640 AIRWAY INHALATION TREATMENT: CPT

## 2018-08-19 PROCEDURE — 94760 N-INVAS EAR/PLS OXIMETRY 1: CPT

## 2018-08-19 PROCEDURE — 80053 COMPREHEN METABOLIC PANEL: CPT

## 2018-08-19 PROCEDURE — 87086 URINE CULTURE/COLONY COUNT: CPT

## 2018-08-19 PROCEDURE — 94761 N-INVAS EAR/PLS OXIMETRY MLT: CPT

## 2018-08-19 PROCEDURE — 71046 X-RAY EXAM CHEST 2 VIEWS: CPT

## 2018-08-19 PROCEDURE — 6360000002 HC RX W HCPCS: Performed by: EMERGENCY MEDICINE

## 2018-08-19 PROCEDURE — 85025 COMPLETE CBC W/AUTO DIFF WBC: CPT

## 2018-08-19 PROCEDURE — 87186 SC STD MICRODIL/AGAR DIL: CPT

## 2018-08-19 PROCEDURE — 99285 EMERGENCY DEPT VISIT HI MDM: CPT

## 2018-08-19 PROCEDURE — 96365 THER/PROPH/DIAG IV INF INIT: CPT

## 2018-08-19 PROCEDURE — 87077 CULTURE AEROBIC IDENTIFY: CPT

## 2018-08-19 PROCEDURE — 81001 URINALYSIS AUTO W/SCOPE: CPT

## 2018-08-19 RX ORDER — CETIRIZINE HYDROCHLORIDE 10 MG/1
10 TABLET ORAL ONCE
Status: COMPLETED | OUTPATIENT
Start: 2018-08-19 | End: 2018-08-19

## 2018-08-19 RX ORDER — MAGNESIUM SULFATE IN WATER 40 MG/ML
4 INJECTION, SOLUTION INTRAVENOUS ONCE
Status: COMPLETED | OUTPATIENT
Start: 2018-08-19 | End: 2018-08-19

## 2018-08-19 RX ORDER — IPRATROPIUM BROMIDE AND ALBUTEROL SULFATE 2.5; .5 MG/3ML; MG/3ML
1 SOLUTION RESPIRATORY (INHALATION) PRN
Status: DISCONTINUED | OUTPATIENT
Start: 2018-08-19 | End: 2018-08-19 | Stop reason: HOSPADM

## 2018-08-19 RX ORDER — PREDNISONE 20 MG/1
60 TABLET ORAL ONCE
Status: DISCONTINUED | OUTPATIENT
Start: 2018-08-19 | End: 2018-08-19

## 2018-08-19 RX ORDER — METHYLPREDNISOLONE SODIUM SUCCINATE 125 MG/2ML
125 INJECTION, POWDER, LYOPHILIZED, FOR SOLUTION INTRAMUSCULAR; INTRAVENOUS ONCE
Status: COMPLETED | OUTPATIENT
Start: 2018-08-19 | End: 2018-08-19

## 2018-08-19 RX ORDER — PREDNISONE 20 MG/1
40 TABLET ORAL DAILY
Qty: 10 TABLET | Refills: 0 | Status: SHIPPED | OUTPATIENT
Start: 2018-08-19 | End: 2018-08-24

## 2018-08-19 RX ORDER — ALBUTEROL SULFATE 90 UG/1
2 AEROSOL, METERED RESPIRATORY (INHALATION) EVERY 4 HOURS PRN
Qty: 1 INHALER | Refills: 1 | Status: SHIPPED | OUTPATIENT
Start: 2018-08-19 | End: 2018-11-13 | Stop reason: ALTCHOICE

## 2018-08-19 RX ORDER — LORATADINE 10 MG/1
10 TABLET ORAL DAILY
Qty: 30 TABLET | Refills: 0 | Status: SHIPPED | OUTPATIENT
Start: 2018-08-19 | End: 2019-05-17 | Stop reason: SDUPTHER

## 2018-08-19 RX ORDER — SULFAMETHOXAZOLE AND TRIMETHOPRIM 800; 160 MG/1; MG/1
1 TABLET ORAL 2 TIMES DAILY
Qty: 14 TABLET | Refills: 0 | Status: SHIPPED | OUTPATIENT
Start: 2018-08-19 | End: 2018-08-26

## 2018-08-19 RX ORDER — ALBUTEROL SULFATE 2.5 MG/3ML
SOLUTION RESPIRATORY (INHALATION)
Qty: 450 ML | Refills: 0 | Status: SHIPPED | OUTPATIENT
Start: 2018-08-19 | End: 2018-11-13 | Stop reason: ALTCHOICE

## 2018-08-19 RX ADMIN — IPRATROPIUM BROMIDE AND ALBUTEROL SULFATE 1 AMPULE: .5; 3 SOLUTION RESPIRATORY (INHALATION) at 09:17

## 2018-08-19 RX ADMIN — METHYLPREDNISOLONE SODIUM SUCCINATE 125 MG: 125 INJECTION, POWDER, FOR SOLUTION INTRAMUSCULAR; INTRAVENOUS at 08:24

## 2018-08-19 RX ADMIN — IPRATROPIUM BROMIDE AND ALBUTEROL SULFATE 1 AMPULE: .5; 3 SOLUTION RESPIRATORY (INHALATION) at 08:26

## 2018-08-19 RX ADMIN — IPRATROPIUM BROMIDE AND ALBUTEROL SULFATE 1 AMPULE: .5; 3 SOLUTION RESPIRATORY (INHALATION) at 08:19

## 2018-08-19 RX ADMIN — CETIRIZINE HYDROCHLORIDE 10 MG: 10 TABLET, FILM COATED ORAL at 10:37

## 2018-08-19 RX ADMIN — MAGNESIUM SULFATE HEPTAHYDRATE 4 G: 40 INJECTION, SOLUTION INTRAVENOUS at 08:24

## 2018-08-19 ASSESSMENT — PAIN DESCRIPTION - LOCATION: LOCATION: CHEST

## 2018-08-19 ASSESSMENT — ENCOUNTER SYMPTOMS
WHEEZING: 1
ABDOMINAL PAIN: 0
EYE PAIN: 0
BACK PAIN: 1
VOMITING: 0
NAUSEA: 0
SORE THROAT: 0
CHEST TIGHTNESS: 1
SHORTNESS OF BREATH: 1
COUGH: 1

## 2018-08-19 ASSESSMENT — PAIN DESCRIPTION - PAIN TYPE: TYPE: ACUTE PAIN

## 2018-08-19 ASSESSMENT — PAIN SCALES - GENERAL: PAINLEVEL_OUTOF10: 5

## 2018-08-19 ASSESSMENT — PAIN DESCRIPTION - DESCRIPTORS: DESCRIPTORS: ACHING

## 2018-08-19 ASSESSMENT — PAIN DESCRIPTION - ORIENTATION: ORIENTATION: RIGHT

## 2018-08-19 ASSESSMENT — PULMONARY FUNCTION TESTS: PEFR_L/MIN: 220

## 2018-08-19 NOTE — ED NOTES
Pst two respiratory treatments patients states that she feels better \"less tight\". Now only expiratory wheezing in all lobes R upper side > than L side. Non productive, dry cough.       Jay Lan, RN  08/19/18 7161

## 2018-08-19 NOTE — ED NOTES
Patient given DC instructions and education. Scripts sent to [facility] on [address] for MAP, patient aware. Patient agrees to FU with pcp. IV discontinued. 97% on RA. Patient denies SOB. No acute distress noted. Up ambulatory with steady gait.       Luz Ferrera RN  08/19/18 9413

## 2018-08-19 NOTE — ED PROVIDER NOTES
TABLET BY MOUTH EVERY NIGHT AT BEDTIME    NEOMYCIN-POLYMYXIN-HYDROCORTISONE 1 % SOLN OTIC SOLUTION    Place 4 drops into the left ear 3 times daily for 10 days. PRAVASTATIN (PRAVACHOL) 20 MG TABLET    Take 1 tablet by mouth every evening    PREDNISONE (DELTASONE) 10 MG TABLET    TAKE 2 TABLETS BY MOUTH TWICE DAILY FOR 5 DAYS    SENNA-DOCUSATE (PERICOLACE) 8.6-50 MG PER TABLET    Take 2 tablets by mouth daily as needed for Constipation    SODIUM CHLORIDE (OCEAN) 0.65 % NASAL SPRAY    1 spray by Nasal route as needed for Congestion    SPACER/AERO-HOLDING CHAMBERS (E-Z SPACER) GASTON    1 Device by Does not apply route daily as needed       ALLERGIES     Bacitracin; Butalbital-aspirin-caffeine; Codeine; Floxin [ofloxacin]; Rocephin [ceftriaxone sodium]; Tomato; Asa [aspirin];  Lisinopril; and Azithromycin    FAMILY HISTORY       Family History   Problem Relation Age of Onset    Heart Failure Mother         dec age   Abbey Lees Diabetes Mother     Cancer Sister         pancreas, dec age 40          SOCIAL HISTORY       Social History     Social History    Marital status:      Spouse name: N/A    Number of children: 1    Years of education: N/A     Occupational History    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 Main Topics    Smoking status: Never Smoker    Smokeless tobacco: Never Used    Alcohol use No    Drug use: No    Sexual activity: Not on file     Other Topics Concern    Not on file     Social History Narrative    Born in [facility], one of 3     Dec 30, 2015 to a [place]    3 children on [facility]    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

## 2018-08-19 NOTE — ED TRIAGE NOTES
Patient from home with shortness of breath. History of asthma. Complains of tightness in chest.  Inspiratory and expiratory wheezing in all lobes. Up with steady gait. % on RA.

## 2018-08-21 LAB
ORGANISM: ABNORMAL
URINE CULTURE, ROUTINE: ABNORMAL
URINE CULTURE, ROUTINE: ABNORMAL

## 2018-09-17 ENCOUNTER — HOSPITAL ENCOUNTER (EMERGENCY)
Age: 53
Discharge: HOME | End: 2018-09-17
Attending: STUDENT IN AN ORGANIZED HEALTH CARE EDUCATION/TRAINING PROGRAM
Payer: COMMERCIAL

## 2018-09-17 VITALS
WEIGHT: 190 LBS | HEART RATE: 90 BPM | DIASTOLIC BLOOD PRESSURE: 100 MMHG | SYSTOLIC BLOOD PRESSURE: 142 MMHG | TEMPERATURE: 98 F | OXYGEN SATURATION: 97 % | BODY MASS INDEX: 31.65 KG/M2 | RESPIRATION RATE: 18 BRPM | HEIGHT: 65 IN

## 2018-09-17 DIAGNOSIS — I10 CHRONIC HYPERTENSION: ICD-10-CM

## 2018-09-17 DIAGNOSIS — Z87.09 HISTORY OF ASTHMA: ICD-10-CM

## 2018-09-17 DIAGNOSIS — H10.33 ACUTE BACTERIAL CONJUNCTIVITIS OF BOTH EYES: Primary | ICD-10-CM

## 2018-09-17 DIAGNOSIS — H00.011 HORDEOLUM OF RIGHT UPPER EYELID, UNSPECIFIED HORDEOLUM TYPE: ICD-10-CM

## 2018-09-17 PROCEDURE — 99283 EMERGENCY DEPT VISIT LOW MDM: CPT

## 2018-09-17 RX ORDER — TOBRAMYCIN 3 MG/ML
2 SOLUTION/ DROPS OPHTHALMIC EVERY 4 HOURS
Qty: 5 ML | Refills: 0 | Status: SHIPPED | OUTPATIENT
Start: 2018-09-17 | End: 2018-09-27

## 2018-09-17 RX ORDER — TOBRAMYCIN 3 MG/ML
2 SOLUTION/ DROPS OPHTHALMIC EVERY 4 HOURS
Qty: 5 ML | Refills: 0 | Status: SHIPPED | OUTPATIENT
Start: 2018-09-17 | End: 2018-09-17

## 2018-09-17 ASSESSMENT — PAIN SCALES - GENERAL: PAINLEVEL_OUTOF10: 7

## 2018-09-17 ASSESSMENT — ENCOUNTER SYMPTOMS
ABDOMINAL PAIN: 0
SINUS PRESSURE: 0
VOMITING: 0
DIARRHEA: 0
EYE DISCHARGE: 1
SHORTNESS OF BREATH: 0
TROUBLE SWALLOWING: 0
COUGH: 1
CHEST TIGHTNESS: 0
EYE REDNESS: 1
BACK PAIN: 0
CHOKING: 0

## 2018-09-17 ASSESSMENT — PAIN DESCRIPTION - LOCATION: LOCATION: EYE

## 2018-09-17 ASSESSMENT — PAIN DESCRIPTION - DESCRIPTORS: DESCRIPTORS: NAGGING;CONSTANT

## 2018-09-17 ASSESSMENT — PAIN DESCRIPTION - ONSET: ONSET: ON-GOING

## 2018-09-17 ASSESSMENT — PAIN DESCRIPTION - FREQUENCY: FREQUENCY: CONTINUOUS

## 2018-09-17 ASSESSMENT — PAIN DESCRIPTION - ORIENTATION: ORIENTATION: RIGHT;LEFT

## 2018-09-17 NOTE — ED PROVIDER NOTES
[address] [facility] ED  eMERGENCY dEPARTMENT eNCOUnter      Pt Name: Kareem sarah  MRN: [id number]  Vandanagfignacia 1965  Date of evaluation: 9/17/2018  Provider: Thom Narayan, [address] [place] [address]       Chief Complaint   Patient presents with    Eye Drainage     bilateral eye swelling, drainage in right eye, blurred vision         HISTORY OF PRESENT ILLNESS  (Location/Symptom, Timing/Onset, Context/Setting, Quality, Duration, Modifying Factors, Severity.)   Kareem sarah is a 46 y.o. female who presents to the emergency department with complaint of yellow eye drainage x 1 week. Patient also c/o a \"stye\" to her upper right eyelid. Patient says she coughs but uses her asthma medication and it goes away. No fever, no vomiting. She states the right eye gets blurred when there is a large amount of mucus to it. She states when she wipes her eye in the mucus is gone that her vision is no longer blurred. Patient denies headache. Patient denies stiff neck. HPI    Nursing Notes were reviewed and I agree. REVIEW OF SYSTEMS    (2-9 systems for level 4, 10 or more for level 5)     Review of Systems   Constitutional: Negative for activity change, appetite change, chills, fever and unexpected weight change. HENT: Negative for drooling, ear pain, nosebleeds, sinus pressure and trouble swallowing. Eyes: Positive for discharge, redness and visual disturbance ( Blurred vision to the right eye when mucus discharge from that eye only). Respiratory: Positive for cough (  Relieved with asthma medicine). Negative for choking, chest tightness and shortness of breath. Cardiovascular: Negative for chest pain and leg swelling. Gastrointestinal: Negative for abdominal pain, diarrhea and vomiting. Endocrine: Negative for polydipsia and polyphagia. Genitourinary: Negative for dysuria, flank pain and frequency. Musculoskeletal: Negative for back pain and myalgias.    Skin: Negative for pallor and rash.   Neurological: Negative for syncope, weakness and headaches. Hematological: Does not bruise/bleed easily. All other systems reviewed and are negative. Except as noted above the remainder of the review of systems was reviewed and negative. PAST MEDICAL HISTORY     Past Medical History:   Diagnosis Date    Allergic rhinitis     Anxiety disorder     Asthma, moderate persistent, poorly-controlled     intubated x 2013    Cleft lip     Divergent strabismus     Diverticulitis 2013    s/p partial colectomy, Dr Maya Prudent Hyperlipidemia LDL goal <100     Hypertension     Obesity (BMI 30-39. 9)          SURGICAL HISTORY       Past Surgical History:   Procedure Laterality Date     SECTION      x 3    CLEFT LIP REPAIR      OTHER SURGICAL HISTORY  13    Exploratory laparotomy with sigmoid colectomy, drainage pelvic abscess and formation end descending colostomy    OTHER SURGICAL HISTORY  3/20/14    Exploratory lapartomy with extensived lysis of adhesions, takedown of colostmy with primary stapled anastomosis, rigid sigmoidoscopy         CURRENT MEDICATIONS       Previous Medications    ADVAIR DISKUS 250-50 MCG/DOSE AEPB    INHALE 1 PUFF BY MOUTH INTO THE LUNGS TWICE DAILY    ALBUTEROL (PROVENTIL) (2.5 MG/3ML) 0.083% NEBULIZER SOLUTION    USE 1 VIAL VIA NEBULIZER EVERY 6 HOURS AS NEEDED    ALBUTEROL SULFATE HFA (PROVENTIL HFA) 108 (90 BASE) MCG/ACT INHALER    INHALE 2 PUFFS BY MOUTH INTO LUNGS EVERY 6 HOURS AS NEEDED FOR WHEEZONG    ALBUTEROL SULFATE HFA (PROVENTIL HFA) 108 (90 BASE) MCG/ACT INHALER    Inhale 2 puffs into the lungs every 4 hours as needed for Wheezing or Shortness of Breath With spacer (and mask if indicated). Thanks.     AMLODIPINE (NORVASC) 5 MG TABLET    TAKE 1 TABLET BY MOUTH EVERY DAY    BENZONATATE (TESSALON) 100 MG CAPSULE    TAKE 1 CAPSULE BY MOUTH THREE TIMES DAILY AS NEEDED FOR COUGH    BUDESONIDE (PULMICORT) 0.25 MG/2ML NEBULIZER SUSPENSION    USE 1 VIAL VIA NEBULIZER TWICE DAILY    CETIRIZINE (ZYRTEC) 10 MG TABLET    TK 1 T PO QD    FAMOTIDINE (PEPCID) 40 MG TABLET    Take 1 tablet by mouth every evening    FERROUS SULFATE (FE TABS) 325 (65 FE) MG EC TABLET    Take 1 tablet by mouth every morning (before breakfast)    FLUTICASONE (FLONASE) 50 MCG/ACT NASAL SPRAY    1 spray by Nasal route daily    LORATADINE (CLARITIN) 10 MG TABLET    Take 1 tablet by mouth daily    MONTELUKAST (SINGULAIR) 10 MG TABLET    TAKE 1 TABLET BY MOUTH EVERY NIGHT AT BEDTIME    NEOMYCIN-POLYMYXIN-HYDROCORTISONE 1 % SOLN OTIC SOLUTION    Place 4 drops into the left ear 3 times daily for 10 days. PRAVASTATIN (PRAVACHOL) 20 MG TABLET    Take 1 tablet by mouth every evening    PREDNISONE (DELTASONE) 10 MG TABLET    TAKE 2 TABLETS BY MOUTH TWICE DAILY FOR 5 DAYS    SENNA-DOCUSATE (PERICOLACE) 8.6-50 MG PER TABLET    Take 2 tablets by mouth daily as needed for Constipation    SODIUM CHLORIDE (OCEAN) 0.65 % NASAL SPRAY    1 spray by Nasal route as needed for Congestion    SPACER/AERO-HOLDING CHAMBERS (E-Z SPACER) AGSTON    1 Device by Does not apply route daily as needed       ALLERGIES     Bacitracin; Butalbital-aspirin-caffeine; Codeine; Floxin [ofloxacin]; Rocephin [ceftriaxone sodium]; Tomato; Asa [aspirin];  Lisinopril; and Azithromycin    FAMILY HISTORY       Family History   Problem Relation Age of Onset    Heart Failure Mother         dec age   [employer] Diabetes Mother     Cancer Sister         pancreas, dec age 40          SOCIAL HISTORY       Social History     Social History    Marital status:      Spouse name: N/A    Number of children: 1    Years of education: N/A     Occupational History    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 Lymphadenopathy:        Head (right side): No submental adenopathy present. Head (left side): No submental adenopathy present. She has no cervical adenopathy. Neurological: She is alert and oriented to person, place, and time. No cranial nerve deficit. Coordination normal.   Skin: Skin is warm and dry. No rash noted. She is not diaphoretic. No erythema. No pallor. Psychiatric: She has a normal mood and affect. Her behavior is normal. Judgment and thought content normal.   Nursing note and vitals reviewed. DIAGNOSTIC RESULTS     RADIOLOGY:   Non-plain film images such as CT, Ultrasound and MRI are read by the radiologist. Plain radiographic images are visualized and preliminarily interpreted by Gold Grayson DO with the below findings:        Interpretation per the Radiologist below, if available at the time of this note:    No orders to display       LABS:  Labs Reviewed - No data to display    All other labs were within normal range or not returned as of this dictation. EMERGENCY DEPARTMENT COURSE and DIFFERENTIAL DIAGNOSIS/MDM:   Vitals:    Vitals:    09/17/18 0718   BP: (!) 142/100   Pulse: 90   Resp: 18   Temp: 98 °F (36.7 °C)   TempSrc: Oral   SpO2: 97%   Weight: 190 lb (86.2 kg)   Height: 5' 5\" (1.651 m)           MDM  Patient reports chronic hypertension that she is on medication for. Patient had blurred vision only when she has excessive mucous coming from her right eye. Patient states that her right eye has more discharged in the left eye. Patient has a hordeolum to the upper right eyelid. I advised the patient to place warm but not hot compress to the right eye. The patient states that she's been doing that and that the stye has gone down in size. I advised the patient that she needs to follow-up with an eye doctor. Patient states that she is seen [facility] for her glasses and we'll gladly take a referral for an eye doctor.   I will

## 2018-10-09 DIAGNOSIS — F06.4 ANXIETY DISORDER DUE TO MEDICAL CONDITION: Primary | ICD-10-CM

## 2018-10-09 RX ORDER — ALPRAZOLAM 0.5 MG/1
0.5 TABLET ORAL DAILY PRN
Qty: 30 TABLET | Refills: 1 | Status: SHIPPED | OUTPATIENT
Start: 2018-10-09 | End: 2018-11-08

## 2018-10-15 ENCOUNTER — TELEPHONE (OUTPATIENT)
Dept: INTERNAL MEDICINE CLINIC | Age: 53
End: 2018-10-15

## 2018-10-15 DIAGNOSIS — J45.41 MODERATE PERSISTENT ASTHMA WITH EXACERBATION: Primary | ICD-10-CM

## 2018-10-15 RX ORDER — PREDNISONE 20 MG/1
20 TABLET ORAL 2 TIMES DAILY
Qty: 10 TABLET | Refills: 2 | Status: SHIPPED | OUTPATIENT
Start: 2018-10-15 | End: 2019-01-15 | Stop reason: SDUPTHER

## 2018-10-15 RX ORDER — DEXTROMETHORPHAN HYDROBROMIDE AND PROMETHAZINE HYDROCHLORIDE 15; 6.25 MG/5ML; MG/5ML
5 SYRUP ORAL 4 TIMES DAILY PRN
Qty: 1 BOTTLE | Refills: 1 | Status: SHIPPED | OUTPATIENT
Start: 2018-10-15 | End: 2018-10-22

## 2018-10-15 RX ORDER — DOXYCYCLINE HYCLATE 100 MG
100 TABLET ORAL 2 TIMES DAILY
Qty: 10 TABLET | Refills: 2 | Status: SHIPPED | OUTPATIENT
Start: 2018-10-15 | End: 2018-10-20

## 2018-10-17 RX ORDER — BENZONATATE 100 MG/1
CAPSULE ORAL
Qty: 30 CAPSULE | Refills: 0 | Status: SHIPPED | OUTPATIENT
Start: 2018-10-17 | End: 2019-01-15 | Stop reason: SDUPTHER

## 2018-11-13 ENCOUNTER — HOSPITAL ENCOUNTER (EMERGENCY)
Age: 53
Discharge: HOME | End: 2018-11-13
Payer: COMMERCIAL

## 2018-11-13 ENCOUNTER — APPOINTMENT (OUTPATIENT)
Dept: GENERAL RADIOLOGY | Age: 53
End: 2018-11-13
Payer: COMMERCIAL

## 2018-11-13 VITALS
RESPIRATION RATE: 18 BRPM | BODY MASS INDEX: 31.65 KG/M2 | TEMPERATURE: 97.9 F | SYSTOLIC BLOOD PRESSURE: 148 MMHG | HEART RATE: 98 BPM | HEIGHT: 65 IN | WEIGHT: 190 LBS | DIASTOLIC BLOOD PRESSURE: 98 MMHG | OXYGEN SATURATION: 97 %

## 2018-11-13 DIAGNOSIS — J45.21 MILD INTERMITTENT ASTHMA WITH ACUTE EXACERBATION: ICD-10-CM

## 2018-11-13 DIAGNOSIS — N39.0 URINARY TRACT INFECTION IN FEMALE: Primary | ICD-10-CM

## 2018-11-13 LAB
BACTERIA: ABNORMAL /HPF
BILIRUBIN URINE: NEGATIVE
BLOOD, URINE: ABNORMAL
CLARITY: ABNORMAL
COLOR: YELLOW
EPITHELIAL CELLS, UA: ABNORMAL /HPF
GLUCOSE URINE: NEGATIVE MG/DL
KETONES, URINE: NEGATIVE MG/DL
LEUKOCYTE ESTERASE, URINE: ABNORMAL
NITRITE, URINE: POSITIVE
PH UA: 6 (ref 5–9)
PROTEIN UA: NEGATIVE MG/DL
RAPID INFLUENZA  B AGN: NEGATIVE
RAPID INFLUENZA A AGN: NEGATIVE
RBC UA: ABNORMAL /HPF (ref 0–2)
RENAL EPITHELIAL, UA: ABNORMAL /HPF
SPECIFIC GRAVITY UA: 1.01 (ref 1–1.03)
URINE REFLEX TO CULTURE: YES
UROBILINOGEN, URINE: 1 E.U./DL
WBC UA: >100 /HPF (ref 0–5)

## 2018-11-13 PROCEDURE — 6370000000 HC RX 637 (ALT 250 FOR IP): Performed by: NURSE PRACTITIONER

## 2018-11-13 PROCEDURE — 99284 EMERGENCY DEPT VISIT MOD MDM: CPT

## 2018-11-13 PROCEDURE — 81001 URINALYSIS AUTO W/SCOPE: CPT

## 2018-11-13 PROCEDURE — 87086 URINE CULTURE/COLONY COUNT: CPT

## 2018-11-13 PROCEDURE — 87186 SC STD MICRODIL/AGAR DIL: CPT

## 2018-11-13 PROCEDURE — 71046 X-RAY EXAM CHEST 2 VIEWS: CPT

## 2018-11-13 PROCEDURE — 87804 INFLUENZA ASSAY W/OPTIC: CPT

## 2018-11-13 PROCEDURE — 94640 AIRWAY INHALATION TREATMENT: CPT

## 2018-11-13 PROCEDURE — 87077 CULTURE AEROBIC IDENTIFY: CPT

## 2018-11-13 RX ORDER — ALBUTEROL SULFATE 90 UG/1
2 AEROSOL, METERED RESPIRATORY (INHALATION) 4 TIMES DAILY PRN
Qty: 1 INHALER | Refills: 2 | Status: SHIPPED | OUTPATIENT
Start: 2018-11-13 | End: 2019-12-13 | Stop reason: SDUPTHER

## 2018-11-13 RX ORDER — NITROFURANTOIN 25; 75 MG/1; MG/1
100 CAPSULE ORAL 2 TIMES DAILY
Qty: 20 CAPSULE | Refills: 0 | Status: SHIPPED | OUTPATIENT
Start: 2018-11-13 | End: 2018-11-23

## 2018-11-13 RX ORDER — PREDNISONE 20 MG/1
60 TABLET ORAL ONCE
Status: COMPLETED | OUTPATIENT
Start: 2018-11-13 | End: 2018-11-13

## 2018-11-13 RX ORDER — PHENAZOPYRIDINE HYDROCHLORIDE 100 MG/1
100 TABLET, FILM COATED ORAL 3 TIMES DAILY PRN
Qty: 6 TABLET | Refills: 0 | Status: SHIPPED | OUTPATIENT
Start: 2018-11-13 | End: 2018-11-16

## 2018-11-13 RX ORDER — PREDNISONE 10 MG/1
50 TABLET ORAL DAILY
Qty: 25 TABLET | Refills: 0 | Status: SHIPPED | OUTPATIENT
Start: 2018-11-13 | End: 2018-11-18

## 2018-11-13 RX ORDER — IPRATROPIUM BROMIDE AND ALBUTEROL SULFATE 2.5; .5 MG/3ML; MG/3ML
1 SOLUTION RESPIRATORY (INHALATION) PRN
Status: DISCONTINUED | OUTPATIENT
Start: 2018-11-13 | End: 2018-11-13 | Stop reason: HOSPADM

## 2018-11-13 RX ADMIN — PREDNISONE 60 MG: 20 TABLET ORAL at 10:49

## 2018-11-13 RX ADMIN — IPRATROPIUM BROMIDE AND ALBUTEROL SULFATE 1 AMPULE: .5; 3 SOLUTION RESPIRATORY (INHALATION) at 10:47

## 2018-11-13 ASSESSMENT — ENCOUNTER SYMPTOMS
COUGH: 0
ABDOMINAL PAIN: 0
SORE THROAT: 0
WHEEZING: 1
SHORTNESS OF BREATH: 0
BACK PAIN: 1

## 2018-11-13 ASSESSMENT — PAIN SCALES - GENERAL: PAINLEVEL_OUTOF10: 8

## 2018-11-13 ASSESSMENT — PAIN DESCRIPTION - PAIN TYPE: TYPE: ACUTE PAIN

## 2018-11-13 ASSESSMENT — PAIN DESCRIPTION - DESCRIPTORS: DESCRIPTORS: ACHING;SPASM

## 2018-11-15 LAB
ORGANISM: ABNORMAL
URINE CULTURE, ROUTINE: ABNORMAL
URINE CULTURE, ROUTINE: ABNORMAL

## 2019-01-15 RX ORDER — PREDNISONE 20 MG/1
TABLET ORAL
Qty: 10 TABLET | Refills: 0 | Status: SHIPPED | OUTPATIENT
Start: 2019-01-15 | End: 2019-02-18 | Stop reason: SDUPTHER

## 2019-01-15 RX ORDER — BENZONATATE 100 MG/1
CAPSULE ORAL
Qty: 30 CAPSULE | Refills: 0 | Status: SHIPPED | OUTPATIENT
Start: 2019-01-15 | End: 2019-02-18 | Stop reason: SDUPTHER

## 2019-02-14 ENCOUNTER — TELEPHONE (OUTPATIENT)
Dept: INTERNAL MEDICINE CLINIC | Age: 54
End: 2019-02-14

## 2019-02-14 RX ORDER — ALBUTEROL SULFATE 2.5 MG/3ML
SOLUTION RESPIRATORY (INHALATION)
Qty: 450 ML | Refills: 3 | Status: SHIPPED | OUTPATIENT
Start: 2019-02-14 | End: 2019-05-17 | Stop reason: SDUPTHER

## 2019-02-18 DIAGNOSIS — J45.21 MILD INTERMITTENT ASTHMA WITH EXACERBATION: Primary | ICD-10-CM

## 2019-02-18 RX ORDER — DOXYCYCLINE HYCLATE 100 MG
100 TABLET ORAL 2 TIMES DAILY
Qty: 14 TABLET | Refills: 0 | Status: SHIPPED | OUTPATIENT
Start: 2019-02-18 | End: 2019-02-25

## 2019-02-18 RX ORDER — PREDNISONE 20 MG/1
TABLET ORAL
Qty: 10 TABLET | Refills: 0 | Status: SHIPPED | OUTPATIENT
Start: 2019-02-18 | End: 2019-03-21 | Stop reason: SDUPTHER

## 2019-02-18 RX ORDER — BENZONATATE 100 MG/1
CAPSULE ORAL
Qty: 30 CAPSULE | Refills: 0 | Status: SHIPPED | OUTPATIENT
Start: 2019-02-18 | End: 2019-05-17

## 2019-03-21 RX ORDER — PREDNISONE 20 MG/1
TABLET ORAL
Qty: 10 TABLET | Refills: 1 | Status: SHIPPED | OUTPATIENT
Start: 2019-03-21 | End: 2019-05-17

## 2019-05-17 ENCOUNTER — OFFICE VISIT (OUTPATIENT)
Dept: FAMILY MEDICINE CLINIC | Age: 54
End: 2019-05-17
Payer: COMMERCIAL

## 2019-05-17 VITALS
OXYGEN SATURATION: 97 % | SYSTOLIC BLOOD PRESSURE: 140 MMHG | HEART RATE: 63 BPM | HEIGHT: 65 IN | DIASTOLIC BLOOD PRESSURE: 100 MMHG | WEIGHT: 204 LBS | BODY MASS INDEX: 33.99 KG/M2 | TEMPERATURE: 96.1 F

## 2019-05-17 DIAGNOSIS — J45.41 MODERATE PERSISTENT ASTHMA WITH EXACERBATION: Primary | ICD-10-CM

## 2019-05-17 DIAGNOSIS — J30.2 SEASONAL ALLERGIES: ICD-10-CM

## 2019-05-17 DIAGNOSIS — I10 ESSENTIAL HYPERTENSION: ICD-10-CM

## 2019-05-17 DIAGNOSIS — J01.40 ACUTE PANSINUSITIS, RECURRENCE NOT SPECIFIED: ICD-10-CM

## 2019-05-17 PROCEDURE — 99214 OFFICE O/P EST MOD 30 MIN: CPT | Performed by: FAMILY MEDICINE

## 2019-05-17 RX ORDER — ALBUTEROL SULFATE 2.5 MG/3ML
SOLUTION RESPIRATORY (INHALATION)
Qty: 450 ML | Refills: 3 | Status: SHIPPED | OUTPATIENT
Start: 2019-05-17 | End: 2019-11-25 | Stop reason: SDUPTHER

## 2019-05-17 RX ORDER — MONTELUKAST SODIUM 10 MG/1
TABLET ORAL
Qty: 90 TABLET | Refills: 2 | Status: SHIPPED | OUTPATIENT
Start: 2019-05-17 | End: 2020-03-19 | Stop reason: SDUPTHER

## 2019-05-17 RX ORDER — ALPRAZOLAM 0.5 MG/1
TABLET ORAL
Refills: 1 | COMMUNITY
Start: 2019-03-21 | End: 2019-06-25

## 2019-05-17 RX ORDER — AMLODIPINE BESYLATE 5 MG/1
TABLET ORAL
Qty: 30 TABLET | Refills: 2 | Status: SHIPPED | OUTPATIENT
Start: 2019-05-17 | End: 2019-09-27 | Stop reason: SDUPTHER

## 2019-05-17 RX ORDER — PRAVASTATIN SODIUM 20 MG
20 TABLET ORAL EVERY EVENING
Qty: 30 TABLET | Refills: 2 | Status: SHIPPED | OUTPATIENT
Start: 2019-05-17 | End: 2019-09-27 | Stop reason: SDUPTHER

## 2019-05-17 RX ORDER — PREDNISONE 20 MG/1
40 TABLET ORAL DAILY
Qty: 10 TABLET | Refills: 0 | Status: SHIPPED | OUTPATIENT
Start: 2019-05-17 | End: 2019-06-25 | Stop reason: SDUPTHER

## 2019-05-17 RX ORDER — LORATADINE 10 MG/1
10 TABLET ORAL DAILY
Qty: 30 TABLET | Refills: 2 | Status: SHIPPED | OUTPATIENT
Start: 2019-05-17 | End: 2019-10-26 | Stop reason: SDUPTHER

## 2019-05-17 RX ORDER — FLUTICASONE PROPIONATE 50 MCG
1 SPRAY, SUSPENSION (ML) NASAL DAILY
Qty: 1 BOTTLE | Refills: 1 | Status: SHIPPED | OUTPATIENT
Start: 2019-05-17 | End: 2019-06-04 | Stop reason: SDUPTHER

## 2019-05-17 NOTE — PROGRESS NOTES
hematuria, nocturia or frequency, urinary incontinence. NEUROLOGIC: Denies headaches, dizziness, syncope, weakness  MUSCULOSKELETAL: denies changes in range of motion, joint pain, stiffness. ENDOCRINOLOGY: Denies heat or cold intolerance. HEMATOLOGY: Denies easy bleeding or blood transfusion,anemia  DERMATOLOGY: Denies changes in moles or pigmentation changes. PSYCHIATRY: Denies depression, agitation or anxiety. Past Medical History:   Diagnosis Date    Allergic rhinitis     Anxiety disorder     Asthma, moderate persistent, poorly-controlled     intubated x 2013    Cleft lip     Divergent strabismus     Diverticulitis 2013    s/p partial colectomy, Dr Shlomo Mitchell Hyperlipidemia LDL goal <100     Hypertension     Obesity (BMI 30-39. 9)         Past Surgical History:   Procedure Laterality Date     SECTION      x 3    CLEFT LIP REPAIR      OTHER SURGICAL HISTORY  13    Exploratory laparotomy with sigmoid colectomy, drainage pelvic abscess and formation end descending colostomy    OTHER SURGICAL HISTORY  3/20/14    Exploratory lapartomy with extensived lysis of adhesions, takedown of colostmy with primary stapled anastomosis, rigid sigmoidoscopy        Family History   Problem Relation Age of Onset    Heart Failure Mother         dec age   [facility] Diabetes Mother     Cancer Sister         pancreas, dec age 40        Social History     Socioeconomic History    Marital status:      Spouse name: Not on file    Number of children: 1    Years of education: Not on file    Highest education level: Not on file   Occupational History    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[employer]    Social Needs    Financial resource strain: Not on file    Food insecurity:     Worry: Not on file     Inability: Not on file    Transportation needs:     Medical: Not on file     Non-medical: Not on file   Tobacco Use    Smoking status: Never Smoker    Smokeless tobacco: Never Used Substance and Sexual Activity    Alcohol use: No     Alcohol/week: 0.0 oz     Comment: social    Drug use: No    Sexual activity: Not on file   Lifestyle    Physical activity:     Days per week: Not on file     Minutes per session: Not on file    Stress: Not on file   Relationships    Social connections:     Talks on phone: Not on file     Gets together: Not on file     Attends Anabaptist service: Not on file     Active member of club or organization: Not on file     Attends meetings of clubs or organizations: Not on file     Relationship status: Not on file    Intimate partner violence:     Fear of current or ex partner: Not on file     Emotionally abused: Not on file     Physically abused: Not on file     Forced sexual activity: Not on file   Other Topics Concern    Not on file   Social History Narrative    Born in [facility], one of 3     Dec 30, 2015 to a [place]    3 children on [facility]    Works in [address] 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[place] 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 and grandson    Hobbies cooking, family, bowling        BP (!) 140/100   Pulse 63   Temp 96.1 °F (35.6 °C)   Ht 5' 5\" (1.651 m)   Wt 204 lb (92.5 kg)   SpO2 97%   BMI 33.95 kg/m²        Physical Exam:    General appearance - alert, well appearing, and in no distress  Mental Status - alert, oriented to person, place, and time  Eyes - pupils equal and reactive, extraocular eye movements intact   Ears - bilateral TM's and external ear canals normal   Nose - normal and patent, no erythema, discharge or polyps   Sinuses - Normal paranasal sinuses without tenderness   Throat - mucous membranes moist, pharynx normal without lesions   Neck - supple, no significant adenopathy   Thyroid - thyroid is normal in size without nodules or tenderness    Chest - wheezing in the lung fields bilaterally.     Heart - normal rate, regular rhythm, normal S1, S2, no murmurs, rubs, clicks or gallops  Abdomen - soft, nontender, nondistended, no masses or organomegaly Back exam - full range of motion, no tenderness, palpable spasm or pain on motion   Neurological - alert, oriented, normal speech, no focal findings or movement disorder noted   Musculoskeletal - no joint tenderness, deformity or swelling   Extremities - peripheral pulses normal, no pedal edema, no clubbing or cyanosis   Skin - normal coloration and turgor, no rashes, no suspicious skin lesions noted      Labs   No results found for: TSHREFLEX  TSH   Date Value Ref Range Status   09/18/2014 1.240 0.270 - 4.200 uIU/mL Final   06/18/2014 2.000 0.270 - 4.200 uIU/mL Final   05/14/2014 3.270 uIU/mL Final   12/27/2013 0.509 0.270 - 4.200 uIU/mL Final     Comment:     Effective 12/4/2013  Methodology and/or Reference Range has changed. 12/01/2013 1.511 0.550 - 4.780 uIU/mL Final   03/30/2013 1.634 0.550 - 4.780 uIU/mL Final   02/07/2013 2.275 0.550 - 4.780 uIU/mL Final   01/14/2013 0.627 0.550 - 4.780 uIU/mL Final   04/03/2012 2.059 0.550 - 4.780 uIU/mL Final           A/P: Susy sarah 48 y.o. female presenting for     1. Moderate persistent asthma with exacerbation  Patient given a treatment in the clinic. Patient felt better after the treatment. Will give a steroid burst. Refilled medications   - predniSONE (DELTASONE) 20 MG tablet; Take 2 tablets by mouth daily for 5 days  Dispense: 10 tablet; Refill: 0  - albuterol (PROVENTIL) (2.5 MG/3ML) 0.083% nebulizer solution; USE 1 VIAL VIA NEBULIZER EVERY 6 HOURS AS NEEDED  Dispense: 450 mL; Refill: 3  - loratadine (CLARITIN) 10 MG tablet; Take 1 tablet by mouth daily  Dispense: 30 tablet; Refill: 2  - montelukast (SINGULAIR) 10 MG tablet; TAKE 1 TABLET BY MOUTH EVERY NIGHT AT BEDTIME  Dispense: 90 tablet; Refill: 2  - fluticasone-salmeterol (ADVAIR DISKUS) 250-50 MCG/DOSE AEPB; INHALE 1 PUFF BY MOUTH INTO THE LUNGS TWICE DAILY  Dispense: 1 Inhaler; Refill: 3  - fluticasone (FLONASE) 50 MCG/ACT nasal spray; 1 spray by Nasal route daily  Dispense: 1 Bottle;  Refill: 1    2. Seasonal allergies    - loratadine (CLARITIN) 10 MG tablet; Take 1 tablet by mouth daily  Dispense: 30 tablet; Refill: 2  - montelukast (SINGULAIR) 10 MG tablet; TAKE 1 TABLET BY MOUTH EVERY NIGHT AT BEDTIME  Dispense: 90 tablet; Refill: 2    3. Acute pansinusitis, recurrence not specified      4. Essential hypertension  Uncontrolled. Will come back in 2 weeks for bp recheck. - amLODIPine (NORVASC) 5 MG tablet; TAKE 1 TABLET BY MOUTH EVERY DAY  Dispense: 30 tablet; Refill: 2  - pravastatin (PRAVACHOL) 20 MG tablet; Take 1 tablet by mouth every evening  Dispense: 30 tablet;  Refill: 2

## 2019-06-04 ENCOUNTER — OFFICE VISIT (OUTPATIENT)
Dept: FAMILY MEDICINE CLINIC | Age: 54
End: 2019-06-04
Payer: COMMERCIAL

## 2019-06-04 VITALS
HEIGHT: 64 IN | HEART RATE: 114 BPM | OXYGEN SATURATION: 96 % | BODY MASS INDEX: 34.66 KG/M2 | WEIGHT: 203 LBS | SYSTOLIC BLOOD PRESSURE: 140 MMHG | DIASTOLIC BLOOD PRESSURE: 90 MMHG | TEMPERATURE: 96.5 F

## 2019-06-04 DIAGNOSIS — J30.2 SEASONAL ALLERGIES: ICD-10-CM

## 2019-06-04 DIAGNOSIS — I10 ESSENTIAL HYPERTENSION: ICD-10-CM

## 2019-06-04 DIAGNOSIS — J40 BRONCHITIS: Primary | ICD-10-CM

## 2019-06-04 PROCEDURE — 99214 OFFICE O/P EST MOD 30 MIN: CPT | Performed by: FAMILY MEDICINE

## 2019-06-04 RX ORDER — METHYLPREDNISOLONE 4 MG/1
TABLET ORAL
Qty: 1 KIT | Refills: 0 | Status: SHIPPED | OUTPATIENT
Start: 2019-06-04 | End: 2019-06-10

## 2019-06-04 RX ORDER — PREDNISONE 20 MG/1
40 TABLET ORAL DAILY
Qty: 10 TABLET | Refills: 0 | Status: CANCELLED | OUTPATIENT
Start: 2019-06-04 | End: 2019-06-09

## 2019-06-04 RX ORDER — BENZONATATE 100 MG/1
100-200 CAPSULE ORAL 3 TIMES DAILY PRN
Qty: 60 CAPSULE | Refills: 0 | Status: SHIPPED | OUTPATIENT
Start: 2019-06-04 | End: 2019-06-11

## 2019-06-04 RX ORDER — DOXYCYCLINE HYCLATE 100 MG
100 TABLET ORAL 2 TIMES DAILY
Qty: 14 TABLET | Refills: 0 | Status: SHIPPED | OUTPATIENT
Start: 2019-06-04 | End: 2019-06-11

## 2019-06-04 RX ORDER — FLUTICASONE PROPIONATE 50 MCG
1 SPRAY, SUSPENSION (ML) NASAL DAILY
Qty: 2 BOTTLE | Refills: 1 | Status: SHIPPED | OUTPATIENT
Start: 2019-06-04 | End: 2019-08-29 | Stop reason: SDUPTHER

## 2019-06-04 ASSESSMENT — PATIENT HEALTH QUESTIONNAIRE - PHQ9
SUM OF ALL RESPONSES TO PHQ QUESTIONS 1-9: 0
SUM OF ALL RESPONSES TO PHQ9 QUESTIONS 1 & 2: 0
1. LITTLE INTEREST OR PLEASURE IN DOING THINGS: 0
SUM OF ALL RESPONSES TO PHQ QUESTIONS 1-9: 0
2. FEELING DOWN, DEPRESSED OR HOPELESS: 0

## 2019-06-04 NOTE — PROGRESS NOTES
Chief Complaint   Patient presents with    Asthma     pt states she is coughing more and has chest pain and rib pain due to coughing, wheezing SOB,  tessalon pearls  works well with cough    Anxiety     pt needs to discuss anxiety pills        HPI: Raheel sarah 48 y.o. female presenting for       Asthma   Asthma is improving. Patient reports that she is coughing all night long. Patient reports that there is yellow junk. Patient is using her rescue inhaler but feels like it is not helping. Patient is taking her advair with no relief. She has been taking her Singulair and is compliant with the medication. Would like something for the cough. Hypertension  Patient is here for follow-up of elevated blood pressure. She is not exercising and is adherent to a low-salt diet. Blood pressure is not well controlled at home. Cardiac symptoms: none. Patient denies chest pain, chest pressure/discomfort, claudication, dyspnea, fatigue, irregular heart beat, lower extremity edema, near-syncope, orthopnea, palpitations and paroxysmal nocturnal dyspnea. Cardiovascular risk factors: dyslipidemia, hypertension and sedentary lifestyle. Use of agents associated with hypertension: none. History of target organ damage: none. Patient did not take her blood pressure medication today.      BP Readings from Last 20 Encounters:   06/04/19 (!) 140/90   05/17/19 (!) 140/100   11/13/18 (!) 148/98   09/17/18 (!) 142/100   08/19/18 (!) 131/94   06/28/18 120/80   11/27/17 120/80   10/25/17 135/72   08/01/17 (!) 140/93   05/20/17 126/88   01/24/17 120/82   10/31/16 128/80   11/21/15 130/84   07/01/15 128/88   09/24/14 122/90   06/24/14 130/80   05/28/14 120/78   03/12/14 130/68   02/19/14 (!) 126/94   02/12/14 121/85   ]    Seasonal allergies  Needs a refill on Flonase     Current Outpatient Medications   Medication Sig Dispense Refill    benzonatate (TESSALON) 100 MG capsule Take 1-2 capsules by mouth 3 times daily as needed for Cough 60 capsule 0    methylPREDNISolone (MEDROL DOSEPACK) 4 MG tablet Take by mouth. 1 kit 0    doxycycline hyclate (VIBRA-TABS) 100 MG tablet Take 1 tablet by mouth 2 times daily for 7 days 14 tablet 0    fluticasone (FLONASE) 50 MCG/ACT nasal spray 1 spray by Each Nostril route daily 2 Bottle 1    ALPRAZolam (XANAX) 0.5 MG tablet TK ONE T PO QD PRA  1    albuterol (PROVENTIL) (2.5 MG/3ML) 0.083% nebulizer solution USE 1 VIAL VIA NEBULIZER EVERY 6 HOURS AS NEEDED 450 mL 3    loratadine (CLARITIN) 10 MG tablet Take 1 tablet by mouth daily 30 tablet 2    montelukast (SINGULAIR) 10 MG tablet TAKE 1 TABLET BY MOUTH EVERY NIGHT AT BEDTIME 90 tablet 2    fluticasone-salmeterol (ADVAIR DISKUS) 250-50 MCG/DOSE AEPB INHALE 1 PUFF BY MOUTH INTO THE LUNGS TWICE DAILY 1 Inhaler 3    amLODIPine (NORVASC) 5 MG tablet TAKE 1 TABLET BY MOUTH EVERY DAY 30 tablet 2    pravastatin (PRAVACHOL) 20 MG tablet Take 1 tablet by mouth every evening 30 tablet 2    albuterol sulfate  (90 Base) MCG/ACT inhaler Inhale 2 puffs into the lungs 4 times daily as needed for Wheezing 1 Inhaler 2    senna-docusate (PERICOLACE) 8.6-50 MG per tablet Take 2 tablets by mouth daily as needed for Constipation 30 tablet 3    cetirizine (ZYRTEC) 10 MG tablet TK 1 T PO QD  0    sodium chloride (OCEAN) 0.65 % nasal spray 1 spray by Nasal route as needed for Congestion 1 Bottle 3    budesonide (PULMICORT) 0.25 MG/2ML nebulizer suspension USE 1 VIAL VIA NEBULIZER TWICE DAILY 120 mL 0    famotidine (PEPCID) 40 MG tablet Take 1 tablet by mouth every evening 30 tablet 5    ferrous sulfate (FE TABS) 325 (65 FE) MG EC tablet Take 1 tablet by mouth every morning (before breakfast) 30 tablet 3    Spacer/Aero-Holding Chambers (E-Z SPACER) GASTNO 1 Device by Does not apply route daily as needed 1 Device 0     No current facility-administered medications for this visit.          ROS  CONSTITUTIONAL: The patient denies fevers, chills, sweats and body ache.  HEENT: Denies headache, blurry vision, eye pain, tinnitus, vertigo,  sore throat, neck or thyroid masses. RESPIRATORY: admits to cough with sputum, denies any hemoptysis. Admits to wheezing. CARDIAC: Denies chest pain, pressure, palpitations, Denies lower extremity edema. GASTROINTESTINAL: Denies abdominal pain, constipation, diarrhea, bleeding in the stools,   GENITOURINARY: Denies dysuria, hematuria, nocturia or frequency, urinary incontinence. NEUROLOGIC: Denies headaches, dizziness, syncope, weakness  MUSCULOSKELETAL: denies changes in range of motion, joint pain, stiffness. ENDOCRINOLOGY: Denies heat or cold intolerance. HEMATOLOGY: Denies easy bleeding or blood transfusion,anemia  DERMATOLOGY: Denies changes in moles or pigmentation changes. PSYCHIATRY: Denies depression, agitation or anxiety. Past Medical History:   Diagnosis Date    Allergic rhinitis     Anxiety disorder     Asthma, moderate persistent, poorly-controlled     intubated x 2013    Cleft lip     Divergent strabismus     Diverticulitis 2013    s/p partial colectomy, Dr James Ingram Hyperlipidemia LDL goal <100 2015    Hypertension     Obesity (BMI 30-39. 9)         Past Surgical History:   Procedure Laterality Date     SECTION      x 3    CLEFT LIP REPAIR      OTHER SURGICAL HISTORY  13    Exploratory laparotomy with sigmoid colectomy, drainage pelvic abscess and formation end descending colostomy    OTHER SURGICAL HISTORY  3/20/14    Exploratory lapartomy with extensived lysis of adhesions, takedown of colostmy with primary stapled anastomosis, rigid sigmoidoscopy        Family History   Problem Relation Age of Onset    Heart Failure Mother         dec age   [facility] Diabetes Mother     Cancer Sister         pancreas, dec age 40        Social History     Socioeconomic History    Marital status:      Spouse name: Not on file    Number of children: 1    Years of education: Not on file   [facility] Highest education level: Not on file   Occupational History    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[address] Financial resource strain: Not on file    Food insecurity:     Worry: Not on file     Inability: Not on file    Transportation needs:     Medical: Not on file     Non-medical: Not on file   Tobacco Use    Smoking status: Never Smoker    Smokeless tobacco: Never Used   Substance and Sexual Activity    Alcohol use: No     Alcohol/week: 0.0 oz     Comment: social    Drug use: No    Sexual activity: Not on file   Lifestyle    Physical activity:     Days per week: Not on file     Minutes per session: Not on file    Stress: Not on file   Relationships    Social connections:     Talks on phone: Not on file     Gets together: Not on file     Attends Judaism service: Not on file     Active member of club or organization: Not on file     Attends meetings of clubs or organizations: Not on file     Relationship status: Not on file    Intimate partner violence:     Fear of current or ex partner: Not on file     Emotionally abused: Not on file     Physically abused: Not on file     Forced sexual activity: Not on file   Other Topics Concern    Not on file   Social History Narrative    Born in [facility], one of 3     Dec 30, 2015 to a [place]    3 children on [facility]    Works in [employer] [address] 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[place] 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 and grandson    Hobbies cooking, family, bowling        BP (!) 140/90   Pulse 114   Temp 96.5 °F (35.8 °C)   Ht 5' 4\" (1.626 m)   Wt 203 lb (92.1 kg)   SpO2 96%   BMI 34.84 kg/m²        Physical Exam:    General appearance - alert, well appearing, and in no distress  Mental Status - alert, oriented to person, place, and time  Eyes - pupils equal and reactive, extraocular eye movements intact   Ears - bilateral TM's and external ear canals normal   Nose - normal and patent, no erythema, discharge or polyps   Sinuses - Normal paranasal sinuses without tenderness   Throat - mucous membranes moist, pharynx normal without lesions   Neck - supple, no significant adenopathy   Thyroid - thyroid is normal in size without nodules or tenderness    Chest -  Wheezing bilaterally. Coarse breath sounds bilaterally. Heart - normal rate, regular rhythm, normal S1, S2, no murmurs, rubs, clicks or gallops  Abdomen - soft, nontender, nondistended, no masses or organomegaly   Back exam - full range of motion, no tenderness, palpable spasm or pain on motion   Neurological - alert, oriented, normal speech, no focal findings or movement disorder noted   Musculoskeletal - no joint tenderness, deformity or swelling   Extremities - peripheral pulses normal, no pedal edema, no clubbing or cyanosis   Skin - normal coloration and turgor, no rashes, no suspicious skin lesions noted    Labs   No results found for: TSHREFLEX  TSH   Date Value Ref Range Status   09/18/2014 1.240 0.270 - 4.200 uIU/mL Final   06/18/2014 2.000 0.270 - 4.200 uIU/mL Final   05/14/2014 3.270 uIU/mL Final   12/27/2013 0.509 0.270 - 4.200 uIU/mL Final     Comment:     Effective 12/4/2013  Methodology and/or Reference Range has changed.    12/01/2013 1.511 0.550 - 4.780 uIU/mL Final   03/30/2013 1.634 0.550 - 4.780 uIU/mL Final   02/07/2013 2.275 0.550 - 4.780 uIU/mL Final   01/14/2013 0.627 0.550 - 4.780 uIU/mL Final   04/03/2012 2.059 0.550 - 4.780 uIU/mL Final     Lab Results   Component Value Date     08/19/2018    K 3.9 08/19/2018     08/19/2018    CO2 25 08/19/2018    BUN 10 08/19/2018    CREATININE 0.74 08/19/2018    GLUCOSE 99 08/19/2018    CALCIUM 9.1 08/19/2018    PROT 7.2 08/19/2018    LABALBU 4.2 08/19/2018    BILITOT 1.2 08/19/2018    ALKPHOS 73 08/19/2018    AST 14 08/19/2018    ALT 12 08/19/2018    LABGLOM >60.0 08/19/2018    GFRAA >60.0 08/19/2018    GLOB 3.0 08/19/2018       Lab Results   Component Value Date    WBC 5.5 08/19/2018    HGB 13.1 08/19/2018    HCT 39.7 08/19/2018    MCV 83.6 08/19/2018     08/19/2018       A/P: Leo Schneider lodshruthi 48 y.o. female presenting for      1. Bronchitis    - benzonatate (TESSALON) 100 MG capsule; Take 1-2 capsules by mouth 3 times daily as needed for Cough  Dispense: 60 capsule; Refill: 0  - methylPREDNISolone (MEDROL DOSEPACK) 4 MG tablet; Take by mouth. Dispense: 1 kit; Refill: 0  - doxycycline hyclate (VIBRA-TABS) 100 MG tablet; Take 1 tablet by mouth 2 times daily for 7 days  Dispense: 14 tablet; Refill: 0    2. Seasonal allergies    - fluticasone (FLONASE) 50 MCG/ACT nasal spray; 1 spray by Each Nostril route daily  Dispense: 2 Bottle; Refill: 1    3. Hypertension  Uncontrolled. Likely secondary to medication non-compliance. Counseled patient on the importance of taking medication 1-2 hours prior to coming to the clinic. I spent greater than 25 minutes in this visit, with more than 50 % of the time devoted to the patient counseling regarding patients concerns, evaluation, prognosis, explanation of diagnosis, risks, and benefits of treatments.

## 2019-06-25 DIAGNOSIS — F41.9 ANXIETY: ICD-10-CM

## 2019-06-25 DIAGNOSIS — J45.41 MODERATE PERSISTENT ASTHMA WITH EXACERBATION: ICD-10-CM

## 2019-06-25 DIAGNOSIS — G47.00 INSOMNIA, UNSPECIFIED TYPE: Primary | ICD-10-CM

## 2019-06-25 RX ORDER — ALPRAZOLAM 0.5 MG/1
0.5 TABLET ORAL DAILY PRN
Qty: 30 TABLET | Refills: 0 | Status: SHIPPED | OUTPATIENT
Start: 2019-06-25 | End: 2019-07-25

## 2019-06-25 RX ORDER — PREDNISONE 20 MG/1
40 TABLET ORAL DAILY
Qty: 10 TABLET | Refills: 0 | Status: SHIPPED | OUTPATIENT
Start: 2019-06-25 | End: 2019-11-25 | Stop reason: SDUPTHER

## 2019-06-25 RX ORDER — LORAZEPAM 0.5 MG/1
0.5 TABLET ORAL EVERY 8 HOURS PRN
Qty: 90 TABLET | Refills: 1 | OUTPATIENT
Start: 2019-06-25 | End: 2019-07-25

## 2019-06-25 NOTE — TELEPHONE ENCOUNTER
Patient will be going out of town and would like to see if she can have these refilled by Friday in case she needs them while in [place]. She would like to receive a call letting her know the status of her request as soon as possible. Please advise, thank you.

## 2019-08-29 DIAGNOSIS — J45.41 MODERATE PERSISTENT ASTHMA WITH EXACERBATION: ICD-10-CM

## 2019-08-29 RX ORDER — FLUTICASONE PROPIONATE 50 MCG
1 SPRAY, SUSPENSION (ML) NASAL DAILY
Qty: 2 BOTTLE | Refills: 1 | Status: SHIPPED | OUTPATIENT
Start: 2019-08-29 | End: 2019-12-13 | Stop reason: SDUPTHER

## 2019-09-03 DIAGNOSIS — F06.4 ANXIETY DISORDER DUE TO MEDICAL CONDITION: Primary | ICD-10-CM

## 2019-09-04 RX ORDER — ALPRAZOLAM 0.5 MG/1
TABLET ORAL
Qty: 30 TABLET | Refills: 0 | Status: SHIPPED | OUTPATIENT
Start: 2019-09-04 | End: 2019-10-04

## 2019-09-25 DIAGNOSIS — J45.41 MODERATE PERSISTENT ASTHMA WITH EXACERBATION: ICD-10-CM

## 2019-09-26 RX ORDER — PREDNISONE 20 MG/1
40 TABLET ORAL DAILY
Qty: 10 TABLET | Refills: 0 | Status: SHIPPED | OUTPATIENT
Start: 2019-09-26 | End: 2019-10-01

## 2019-09-27 DIAGNOSIS — I10 ESSENTIAL HYPERTENSION: ICD-10-CM

## 2019-09-27 RX ORDER — PRAVASTATIN SODIUM 20 MG
20 TABLET ORAL EVERY EVENING
Qty: 30 TABLET | Refills: 0 | Status: SHIPPED | OUTPATIENT
Start: 2019-09-27 | End: 2019-12-13 | Stop reason: SDUPTHER

## 2019-09-27 RX ORDER — AMLODIPINE BESYLATE 5 MG/1
TABLET ORAL
Qty: 30 TABLET | Refills: 0 | Status: SHIPPED | OUTPATIENT
Start: 2019-09-27 | End: 2019-12-13 | Stop reason: SDUPTHER

## 2019-10-26 DIAGNOSIS — J45.41 MODERATE PERSISTENT ASTHMA WITH EXACERBATION: ICD-10-CM

## 2019-10-26 DIAGNOSIS — J30.2 SEASONAL ALLERGIES: ICD-10-CM

## 2019-10-27 RX ORDER — LORATADINE 10 MG/1
10 TABLET ORAL DAILY
Qty: 30 TABLET | Refills: 3 | Status: SHIPPED | OUTPATIENT
Start: 2019-10-27 | End: 2020-03-19 | Stop reason: SDUPTHER

## 2019-11-25 DIAGNOSIS — F41.9 ANXIETY: Primary | ICD-10-CM

## 2019-11-25 DIAGNOSIS — J45.41 MODERATE PERSISTENT ASTHMA WITH EXACERBATION: ICD-10-CM

## 2019-11-25 RX ORDER — ALPRAZOLAM 0.5 MG/1
0.5 TABLET ORAL NIGHTLY PRN
Qty: 30 TABLET | Refills: 0 | Status: SHIPPED | OUTPATIENT
Start: 2019-11-25 | End: 2019-12-25

## 2019-11-25 RX ORDER — ALBUTEROL SULFATE 2.5 MG/3ML
SOLUTION RESPIRATORY (INHALATION)
Qty: 450 ML | Refills: 0 | Status: SHIPPED | OUTPATIENT
Start: 2019-11-25 | End: 2020-03-19 | Stop reason: SDUPTHER

## 2019-11-25 RX ORDER — BENZONATATE 200 MG/1
200 CAPSULE ORAL 3 TIMES DAILY PRN
Qty: 21 CAPSULE | Refills: 0 | Status: SHIPPED | OUTPATIENT
Start: 2019-11-25 | End: 2019-12-02

## 2019-11-25 RX ORDER — PREDNISONE 20 MG/1
TABLET ORAL
Qty: 10 TABLET | Refills: 0 | Status: SHIPPED | OUTPATIENT
Start: 2019-11-25 | End: 2019-12-17 | Stop reason: SDUPTHER

## 2019-11-25 RX ORDER — BUDESONIDE 0.25 MG/2ML
INHALANT ORAL
Qty: 120 ML | Refills: 0 | Status: SHIPPED | OUTPATIENT
Start: 2019-11-25 | End: 2020-03-19 | Stop reason: SDUPTHER

## 2019-12-09 ENCOUNTER — TELEPHONE (OUTPATIENT)
Dept: FAMILY MEDICINE CLINIC | Age: 54
End: 2019-12-09

## 2019-12-09 DIAGNOSIS — J45.909 UNCOMPLICATED ASTHMA, UNSPECIFIED ASTHMA SEVERITY, UNSPECIFIED WHETHER PERSISTENT: Primary | ICD-10-CM

## 2019-12-13 ENCOUNTER — OFFICE VISIT (OUTPATIENT)
Dept: FAMILY MEDICINE CLINIC | Age: 54
End: 2019-12-13
Payer: COMMERCIAL

## 2019-12-13 VITALS
BODY MASS INDEX: 34.66 KG/M2 | HEART RATE: 92 BPM | TEMPERATURE: 97.6 F | SYSTOLIC BLOOD PRESSURE: 130 MMHG | DIASTOLIC BLOOD PRESSURE: 82 MMHG | WEIGHT: 203 LBS | OXYGEN SATURATION: 99 % | HEIGHT: 64 IN

## 2019-12-13 DIAGNOSIS — K21.9 GASTROESOPHAGEAL REFLUX DISEASE, ESOPHAGITIS PRESENCE NOT SPECIFIED: ICD-10-CM

## 2019-12-13 DIAGNOSIS — E78.5 HYPERLIPIDEMIA, UNSPECIFIED HYPERLIPIDEMIA TYPE: ICD-10-CM

## 2019-12-13 DIAGNOSIS — K59.00 CONSTIPATION, UNSPECIFIED CONSTIPATION TYPE: ICD-10-CM

## 2019-12-13 DIAGNOSIS — J45.41 MODERATE PERSISTENT ASTHMA WITH EXACERBATION: ICD-10-CM

## 2019-12-13 DIAGNOSIS — J45.909 UNCOMPLICATED ASTHMA, UNSPECIFIED ASTHMA SEVERITY, UNSPECIFIED WHETHER PERSISTENT: ICD-10-CM

## 2019-12-13 DIAGNOSIS — R11.2 NAUSEA AND VOMITING, INTRACTABILITY OF VOMITING NOT SPECIFIED, UNSPECIFIED VOMITING TYPE: ICD-10-CM

## 2019-12-13 DIAGNOSIS — D64.9 ANEMIA, UNSPECIFIED TYPE: ICD-10-CM

## 2019-12-13 DIAGNOSIS — Z12.39 BREAST CANCER SCREENING: ICD-10-CM

## 2019-12-13 DIAGNOSIS — I10 HYPERTENSION, UNSPECIFIED TYPE: ICD-10-CM

## 2019-12-13 DIAGNOSIS — I10 ESSENTIAL HYPERTENSION: ICD-10-CM

## 2019-12-13 DIAGNOSIS — E66.9 OBESITY (BMI 30-39.9): ICD-10-CM

## 2019-12-13 DIAGNOSIS — Z12.11 COLON CANCER SCREENING: ICD-10-CM

## 2019-12-13 DIAGNOSIS — J30.2 SEASONAL ALLERGIES: ICD-10-CM

## 2019-12-13 DIAGNOSIS — J20.9 ACUTE BRONCHITIS, UNSPECIFIED ORGANISM: Primary | ICD-10-CM

## 2019-12-13 LAB
CHOLESTEROL, FASTING: 216 MG/DL (ref 0–199)
HDLC SERPL-MCNC: 55 MG/DL (ref 40–59)
LDL CHOLESTEROL CALCULATED: 147 MG/DL (ref 0–129)
TRIGLYCERIDE, FASTING: 68 MG/DL (ref 0–150)

## 2019-12-13 PROCEDURE — 99214 OFFICE O/P EST MOD 30 MIN: CPT | Performed by: FAMILY MEDICINE

## 2019-12-13 RX ORDER — GUAIFENESIN 600 MG/1
600 TABLET, EXTENDED RELEASE ORAL 2 TIMES DAILY
Qty: 30 TABLET | Refills: 0 | Status: SHIPPED | OUTPATIENT
Start: 2019-12-13 | End: 2019-12-28

## 2019-12-13 RX ORDER — FLUTICASONE PROPIONATE 50 MCG
1 SPRAY, SUSPENSION (ML) NASAL DAILY
Qty: 2 BOTTLE | Refills: 1 | Status: SHIPPED | OUTPATIENT
Start: 2019-12-13 | End: 2022-06-20 | Stop reason: SDUPTHER

## 2019-12-13 RX ORDER — ALBUTEROL SULFATE 90 UG/1
2 AEROSOL, METERED RESPIRATORY (INHALATION) 4 TIMES DAILY PRN
Qty: 1 INHALER | Refills: 2 | Status: SHIPPED | OUTPATIENT
Start: 2019-12-13 | End: 2020-03-19 | Stop reason: SDUPTHER

## 2019-12-13 RX ORDER — AMLODIPINE BESYLATE 5 MG/1
5 TABLET ORAL DAILY
Qty: 30 TABLET | Refills: 2 | Status: SHIPPED | OUTPATIENT
Start: 2019-12-13 | End: 2020-03-19 | Stop reason: SDUPTHER

## 2019-12-13 RX ORDER — BENZONATATE 200 MG/1
200 CAPSULE ORAL 3 TIMES DAILY PRN
Qty: 21 CAPSULE | Refills: 0 | Status: CANCELLED | OUTPATIENT
Start: 2019-12-13 | End: 2019-12-20

## 2019-12-13 RX ORDER — NEBULIZER ACCESSORIES
1 KIT MISCELLANEOUS DAILY PRN
Qty: 1 KIT | Refills: 0 | Status: SHIPPED | OUTPATIENT
Start: 2019-12-13 | End: 2021-02-10 | Stop reason: SDUPTHER

## 2019-12-13 RX ORDER — FAMOTIDINE 40 MG/1
20 TABLET, FILM COATED ORAL 2 TIMES DAILY
Qty: 30 TABLET | Refills: 2 | Status: SHIPPED | OUTPATIENT
Start: 2019-12-13 | End: 2020-03-19 | Stop reason: SDUPTHER

## 2019-12-13 RX ORDER — ONDANSETRON 4 MG/1
4 TABLET, FILM COATED ORAL EVERY 12 HOURS PRN
Qty: 5 TABLET | Refills: 0 | Status: SHIPPED | OUTPATIENT
Start: 2019-12-13 | End: 2020-03-19

## 2019-12-13 RX ORDER — POLYETHYLENE GLYCOL 3350 17 G/17G
17 POWDER, FOR SOLUTION ORAL DAILY PRN
Qty: 1530 G | Refills: 1 | Status: SHIPPED | OUTPATIENT
Start: 2019-12-13 | End: 2020-06-10

## 2019-12-13 RX ORDER — BENZONATATE 100 MG/1
100-200 CAPSULE ORAL 3 TIMES DAILY PRN
Qty: 60 CAPSULE | Refills: 0 | Status: SHIPPED | OUTPATIENT
Start: 2019-12-13 | End: 2020-06-17

## 2019-12-13 RX ORDER — AMOXICILLIN 250 MG
2 CAPSULE ORAL DAILY PRN
Qty: 30 TABLET | Refills: 2 | Status: SHIPPED | OUTPATIENT
Start: 2019-12-13 | End: 2019-12-13

## 2019-12-13 RX ORDER — LANOLIN ALCOHOL/MO/W.PET/CERES
325 CREAM (GRAM) TOPICAL
Qty: 30 TABLET | Refills: 2 | Status: SHIPPED | OUTPATIENT
Start: 2019-12-13 | End: 2020-03-19 | Stop reason: SDUPTHER

## 2019-12-13 RX ORDER — AZITHROMYCIN 250 MG/1
250 TABLET, FILM COATED ORAL SEE ADMIN INSTRUCTIONS
Qty: 6 TABLET | Refills: 0 | Status: SHIPPED | OUTPATIENT
Start: 2019-12-13 | End: 2019-12-18

## 2019-12-13 RX ORDER — PRAVASTATIN SODIUM 20 MG
20 TABLET ORAL EVERY EVENING
Qty: 30 TABLET | Refills: 2 | Status: SHIPPED | OUTPATIENT
Start: 2019-12-13 | End: 2020-03-19 | Stop reason: SDUPTHER

## 2019-12-13 RX ORDER — POLYETHYLENE GLYCOL 3350 17 G/17G
17 POWDER, FOR SOLUTION ORAL DAILY
Qty: 1530 G | Refills: 1 | Status: SHIPPED | OUTPATIENT
Start: 2019-12-13 | End: 2019-12-13 | Stop reason: SDUPTHER

## 2019-12-17 ENCOUNTER — TELEPHONE (OUTPATIENT)
Dept: FAMILY MEDICINE CLINIC | Age: 54
End: 2019-12-17

## 2019-12-17 DIAGNOSIS — J45.41 MODERATE PERSISTENT ASTHMA WITH EXACERBATION: ICD-10-CM

## 2019-12-17 RX ORDER — PREDNISONE 20 MG/1
TABLET ORAL
Qty: 10 TABLET | Refills: 0 | Status: SHIPPED | OUTPATIENT
Start: 2019-12-17 | End: 2020-03-19 | Stop reason: SDUPTHER

## 2020-03-19 ENCOUNTER — VIRTUAL VISIT (OUTPATIENT)
Dept: FAMILY MEDICINE CLINIC | Age: 55
End: 2020-03-19
Payer: COMMERCIAL

## 2020-03-19 PROCEDURE — 99443 PR PHYS/QHP TELEPHONE EVALUATION 21-30 MIN: CPT | Performed by: FAMILY MEDICINE

## 2020-03-19 RX ORDER — BUDESONIDE 0.25 MG/2ML
INHALANT ORAL
Qty: 120 ML | Refills: 2 | Status: SHIPPED | OUTPATIENT
Start: 2020-03-19 | End: 2021-02-10

## 2020-03-19 RX ORDER — PREDNISONE 20 MG/1
TABLET ORAL
Qty: 10 TABLET | Refills: 0 | Status: SHIPPED | OUTPATIENT
Start: 2020-03-19 | End: 2020-06-17

## 2020-03-19 RX ORDER — PRAVASTATIN SODIUM 20 MG
20 TABLET ORAL EVERY EVENING
Qty: 30 TABLET | Refills: 2 | Status: SHIPPED | OUTPATIENT
Start: 2020-03-19 | End: 2020-10-19 | Stop reason: SDUPTHER

## 2020-03-19 RX ORDER — LANOLIN ALCOHOL/MO/W.PET/CERES
325 CREAM (GRAM) TOPICAL
Qty: 30 TABLET | Refills: 2 | Status: SHIPPED | OUTPATIENT
Start: 2020-03-19 | End: 2021-02-10 | Stop reason: SDUPTHER

## 2020-03-19 RX ORDER — LORATADINE 10 MG/1
10 TABLET ORAL DAILY
Qty: 30 TABLET | Refills: 2 | Status: SHIPPED | OUTPATIENT
Start: 2020-03-19 | End: 2021-02-10 | Stop reason: SDUPTHER

## 2020-03-19 RX ORDER — AMLODIPINE BESYLATE 5 MG/1
5 TABLET ORAL DAILY
Qty: 30 TABLET | Refills: 2 | Status: SHIPPED | OUTPATIENT
Start: 2020-03-19 | End: 2020-10-19 | Stop reason: SDUPTHER

## 2020-03-19 RX ORDER — ALBUTEROL SULFATE 90 UG/1
2 AEROSOL, METERED RESPIRATORY (INHALATION) 4 TIMES DAILY PRN
Qty: 1 INHALER | Refills: 2 | Status: SHIPPED | OUTPATIENT
Start: 2020-03-19 | End: 2020-10-19 | Stop reason: SDUPTHER

## 2020-03-19 RX ORDER — MONTELUKAST SODIUM 10 MG/1
TABLET ORAL
Qty: 30 TABLET | Refills: 2 | Status: SHIPPED | OUTPATIENT
Start: 2020-03-19 | End: 2021-02-10 | Stop reason: SDUPTHER

## 2020-03-19 RX ORDER — FAMOTIDINE 40 MG/1
20 TABLET, FILM COATED ORAL 2 TIMES DAILY
Qty: 30 TABLET | Refills: 2 | Status: SHIPPED | OUTPATIENT
Start: 2020-03-19 | End: 2021-02-10 | Stop reason: SDUPTHER

## 2020-03-19 RX ORDER — ALBUTEROL SULFATE 2.5 MG/3ML
SOLUTION RESPIRATORY (INHALATION)
Qty: 450 ML | Refills: 2 | Status: SHIPPED | OUTPATIENT
Start: 2020-03-19 | End: 2020-10-19 | Stop reason: SDUPTHER

## 2020-03-19 ASSESSMENT — PATIENT HEALTH QUESTIONNAIRE - PHQ9
SUM OF ALL RESPONSES TO PHQ QUESTIONS 1-9: 0
SUM OF ALL RESPONSES TO PHQ QUESTIONS 1-9: 0
SUM OF ALL RESPONSES TO PHQ9 QUESTIONS 1 & 2: 0
2. FEELING DOWN, DEPRESSED OR HOPELESS: 0
1. LITTLE INTEREST OR PLEASURE IN DOING THINGS: 0

## 2020-03-19 NOTE — PROGRESS NOTES
126/94   ]      Current Outpatient Medications   Medication Sig Dispense Refill    predniSONE (DELTASONE) 20 MG tablet TAKE TWO TABLETS BY MOUTH EVERY DAY FOR 5 DAYS 10 tablet 0    Respiratory Therapy Supplies (NEBULIZER/TUBING/MOUTHPIECE) KIT 1 kit by Does not apply route daily as needed (use as directed) 1 kit 0    pravastatin (PRAVACHOL) 20 MG tablet Take 1 tablet by mouth every evening 30 tablet 2    amLODIPine (NORVASC) 5 MG tablet Take 1 tablet by mouth daily 30 tablet 2    fluticasone (FLONASE) 50 MCG/ACT nasal spray 1 spray by Nasal route daily 2 Bottle 1    albuterol sulfate  (90 Base) MCG/ACT inhaler Inhale 2 puffs into the lungs 4 times daily as needed for Wheezing 1 Inhaler 2    ferrous sulfate (FE TABS) 325 (65 Fe) MG EC tablet Take 1 tablet by mouth every morning (before breakfast) 30 tablet 2    famotidine (PEPCID) 40 MG tablet Take 0.5 tablets by mouth 2 times daily 30 tablet 2    fluticasone-salmeterol (ADVAIR DISKUS) 250-50 MCG/DOSE AEPB INHALE 1 PUFF BY MOUTH INTO THE LUNGS TWICE DAILY 1 Inhaler 2    polyethylene glycol (GLYCOLAX) powder Take 17 g by mouth daily as needed (constipation) Take 17g by mouth once daily PRN 1530 g 1    budesonide (PULMICORT) 0.25 MG/2ML nebulizer suspension USE 1 VIAL VIA NEBULIZER TWICE DAILY 120 mL 0    albuterol (PROVENTIL) (2.5 MG/3ML) 0.083% nebulizer solution USE 1 VIAL VIA NEBULIZER EVERY 6 HOURS AS NEEDED 450 mL 0    loratadine (CLARITIN) 10 MG tablet TAKE 1 TABLET BY MOUTH DAILY 30 tablet 3    montelukast (SINGULAIR) 10 MG tablet TAKE 1 TABLET BY MOUTH EVERY NIGHT AT BEDTIME 90 tablet 2    cetirizine (ZYRTEC) 10 MG tablet TK 1 T PO QD  0    sodium chloride (OCEAN) 0.65 % nasal spray 1 spray by Nasal route as needed for Congestion 1 Bottle 3    Spacer/Aero-Holding Chambers (E-Z SPACER) GASTON 1 Device by Does not apply route daily as needed 1 Device 0     No current facility-administered medications for this visit. changed. 12/01/2013 1.511 0.550 - 4.780 uIU/mL Final   03/30/2013 1.634 0.550 - 4.780 uIU/mL Final   02/07/2013 2.275 0.550 - 4.780 uIU/mL Final   01/14/2013 0.627 0.550 - 4.780 uIU/mL Final   04/03/2012 2.059 0.550 - 4.780 uIU/mL Final     Lab Results   Component Value Date     08/19/2018    K 3.9 08/19/2018     08/19/2018    CO2 25 08/19/2018    BUN 10 08/19/2018    CREATININE 0.74 08/19/2018    GLUCOSE 99 08/19/2018    CALCIUM 9.1 08/19/2018    PROT 7.2 08/19/2018    LABALBU 4.2 08/19/2018    BILITOT 1.2 08/19/2018    ALKPHOS 73 08/19/2018    AST 14 08/19/2018    ALT 12 08/19/2018    LABGLOM >60.0 08/19/2018    GFRAA >60.0 08/19/2018    GLOB 3.0 08/19/2018       Lab Results   Component Value Date    LABA1C 5.7 11/27/2017     No results found for: EAG  Lab Results   Component Value Date    TSH 1.240 09/18/2014           A/P: Alonzo sarah 47 y.o. female presenting for      1. Moderate persistent asthma with exacerbation    - albuterol (PROVENTIL) (2.5 MG/3ML) 0.083% nebulizer solution; USE 1 VIAL VIA NEBULIZER EVERY 6 HOURS AS NEEDED  Dispense: 450 mL; Refill: 2  - loratadine (CLARITIN) 10 MG tablet; Take 1 tablet by mouth daily  Dispense: 30 tablet; Refill: 2  - montelukast (SINGULAIR) 10 MG tablet; TAKE 1 TABLET BY MOUTH EVERY NIGHT AT BEDTIME  Dispense: 30 tablet; Refill: 2  - fluticasone-salmeterol (ADVAIR DISKUS) 250-50 MCG/DOSE AEPB; INHALE 1 PUFF BY MOUTH INTO THE LUNGS TWICE DAILY  Dispense: 1 Inhaler; Refill: 2  - predniSONE (DELTASONE) 20 MG tablet; TAKE TWO TABLETS BY MOUTH EVERY DAY FOR 5 DAYS  Dispense: 10 tablet; Refill: 0    2. Hypertension, unspecified type    - albuterol sulfate  (90 Base) MCG/ACT inhaler; Inhale 2 puffs into the lungs 4 times daily as needed for Wheezing  Dispense: 1 Inhaler; Refill: 2    3. Seasonal allergies    - loratadine (CLARITIN) 10 MG tablet; Take 1 tablet by mouth daily  Dispense: 30 tablet;  Refill: 2  - montelukast (SINGULAIR) 10 MG tablet;

## 2020-04-02 RX ORDER — ALPRAZOLAM 0.5 MG/1
TABLET ORAL
Qty: 30 TABLET | Refills: 0 | OUTPATIENT
Start: 2020-04-02

## 2020-04-02 RX ORDER — ALPRAZOLAM 0.5 MG/1
0.5 TABLET ORAL DAILY PRN
Qty: 30 TABLET | Refills: 2 | Status: SHIPPED | OUTPATIENT
Start: 2020-04-02 | End: 2020-08-25 | Stop reason: SDUPTHER

## 2020-04-09 ENCOUNTER — OFFICE VISIT (OUTPATIENT)
Dept: PRIMARY CARE CLINIC | Age: 55
End: 2020-04-09

## 2020-04-09 VITALS
TEMPERATURE: 97.9 F | SYSTOLIC BLOOD PRESSURE: 120 MMHG | DIASTOLIC BLOOD PRESSURE: 86 MMHG | WEIGHT: 203 LBS | BODY MASS INDEX: 34.84 KG/M2 | HEART RATE: 96 BPM | OXYGEN SATURATION: 98 %

## 2020-04-09 LAB
INFLUENZA A ANTIBODY: NEGATIVE
INFLUENZA B ANTIBODY: NEGATIVE

## 2020-04-09 PROCEDURE — 87804 INFLUENZA ASSAY W/OPTIC: CPT | Performed by: NURSE PRACTITIONER

## 2020-04-09 PROCEDURE — 99214 OFFICE O/P EST MOD 30 MIN: CPT | Performed by: NURSE PRACTITIONER

## 2020-04-09 RX ORDER — METHYLPREDNISOLONE 4 MG/1
TABLET ORAL
Qty: 1 KIT | Refills: 0 | Status: SHIPPED | OUTPATIENT
Start: 2020-04-09 | End: 2020-10-19

## 2020-04-09 RX ORDER — AZITHROMYCIN 250 MG/1
250 TABLET, FILM COATED ORAL SEE ADMIN INSTRUCTIONS
Qty: 6 TABLET | Refills: 0 | Status: SHIPPED | OUTPATIENT
Start: 2020-04-09 | End: 2020-04-14

## 2020-04-09 ASSESSMENT — VISUAL ACUITY: OU: 1

## 2020-04-09 NOTE — PROGRESS NOTES
normal.      Pupils: Pupils are equal, round, and reactive to light. Cardiovascular:      Rate and Rhythm: Normal rate and regular rhythm. Heart sounds: Normal heart sounds. Pulmonary:      Effort: Pulmonary effort is normal.      Breath sounds: Normal breath sounds and air entry. Lymphadenopathy:      Head:      Right side of head: Tonsillar adenopathy present. Left side of head: Tonsillar adenopathy present. Cervical: Cervical adenopathy present. Skin:     General: Skin is warm and dry. Findings: No rash. Neurological:      Mental Status: She is alert. Assessment/Plan:  1. Suspected COVID-19 virus infection  -Symptomatic treatment discussed  -Self quarantine advised x 14 days  - COVID-19; Future  - POCT Influenza A/B  - Rapid Strep Screen; Future  - azithromycin (ZITHROMAX) 250 MG tablet; Take 1 tablet by mouth See Admin Instructions for 5 days 500mg on day 1 followed by 250mg on days 2 - 5  Dispense: 6 tablet; Refill: 0  - Rapid Strep Screen  - COVID-19    Side effects, adverse effects of the medication prescribed today, as well as treatment plan/ rationale and result expectations have been discussed with the patient who expresses understanding and desires to proceed. Close follow up to evaluate treatment results and for coordination of care. I have reviewed the patient's medical history in detail and updated the computerized patient record. As always, patient is advised that if symptoms worsen in any way they will proceed to the nearest emergency room. ROOPA Meehan - CNP  4/9/20     This visit was provided as a focused evaluation during the COVID -19 pandemic/national emergency. A comprehensive review of all previous patient history and testing was not conducted. Pertinent findings were elicited during the visit.

## 2020-04-09 NOTE — PATIENT INSTRUCTIONS
before eating or preparing food. If soap and water are not readily available, use an alcohol-based hand  with at least 60% alcohol, covering all surfaces of your hands and rubbing them together until they feel dry. Soap and water are the best option if hands are visibly dirty. Avoid touching your eyes, nose, and mouth with unwashed hands. Avoid sharing personal household items  You should not share dishes, drinking glasses, cups, eating utensils, towels, or bedding with other people or pets in your home. After using these items, they should be washed thoroughly with soap and water. Clean all high-touch surfaces everyday  High touch surfaces include counters, tabletops, doorknobs, bathroom fixtures, toilets, phones, keyboards, tablets, and bedside tables. Also, clean any surfaces that may have blood, stool, or body fluids on them. Use a household cleaning spray or wipe, according to the label instructions. Labels contain instructions for safe and effective use of the cleaning product including precautions you should take when applying the product, such as wearing gloves and making sure you have good ventilation during use of the product. Monitor your symptoms  Seek prompt medical attention if your illness is worsening (e.g., difficulty breathing). Before seeking care, call your healthcare provider and tell them that you have, or are being evaluated for, COVID-19. Put on a facemask before you enter the facility. These steps will help the healthcare providers office to keep other people in the office or waiting room from getting infected or exposed. Ask your healthcare provider to call the local or state health department. Persons who are placed under active monitoring or facilitated self-monitoring should follow instructions provided by their local health department or occupational health professionals, as appropriate. When working with your local health department check their available hours.   If you have a medical emergency and need to call 911, notify the dispatch personnel that you have, or are being evaluated for COVID-19. If possible, put on a facemask before emergency medical services arrive. Discontinuing home isolation  Patients with confirmed COVID-19 should remain under home isolation precautions until the risk of secondary transmission to others is thought to be low. The decision to discontinue home isolation precautions should be made on a case-by-case basis, in consultation with healthcare providers and state and local health departments.

## 2020-04-11 LAB
SARS-COV-2: NOT DETECTED
SOURCE: NORMAL

## 2020-04-13 ENCOUNTER — TELEPHONE (OUTPATIENT)
Dept: PRIMARY CARE CLINIC | Age: 55
End: 2020-04-13

## 2020-04-13 RX ORDER — POLYMYXIN B SULFATE AND TRIMETHOPRIM 1; 10000 MG/ML; [USP'U]/ML
2 SOLUTION OPHTHALMIC 3 TIMES DAILY
Qty: 10 ML | Refills: 0 | Status: SHIPPED | OUTPATIENT
Start: 2020-04-13 | End: 2020-04-20

## 2020-04-15 ENCOUNTER — TELEPHONE (OUTPATIENT)
Dept: PRIMARY CARE CLINIC | Age: 55
End: 2020-04-15

## 2020-04-20 ENCOUNTER — TELEPHONE (OUTPATIENT)
Dept: FAMILY MEDICINE CLINIC | Age: 55
End: 2020-04-20

## 2020-04-21 ENCOUNTER — VIRTUAL VISIT (OUTPATIENT)
Dept: FAMILY MEDICINE CLINIC | Age: 55
End: 2020-04-21
Payer: COMMERCIAL

## 2020-04-21 PROCEDURE — 99442 PR PHYS/QHP TELEPHONE EVALUATION 11-20 MIN: CPT | Performed by: FAMILY MEDICINE

## 2020-04-21 ASSESSMENT — ENCOUNTER SYMPTOMS
CHOKING: 0
COLOR CHANGE: 0
NAUSEA: 0
TROUBLE SWALLOWING: 0
BACK PAIN: 0
SINUS PAIN: 0
EYE DISCHARGE: 0
VOICE CHANGE: 0
CONSTIPATION: 0
ABDOMINAL DISTENTION: 0
SORE THROAT: 0
SINUS PRESSURE: 0
EYE ITCHING: 0
DIARRHEA: 0
COUGH: 0
SHORTNESS OF BREATH: 0
VOMITING: 0
BLOOD IN STOOL: 0
RECTAL PAIN: 0
ABDOMINAL PAIN: 0
EYE REDNESS: 0
PHOTOPHOBIA: 0
CHEST TIGHTNESS: 0

## 2020-04-21 NOTE — PROGRESS NOTES
2020    TELEHEALTH EVALUATION -- Audio/Visual (During ZVJCT-55 public health emergency)    Due to Lori 19 outbreak, patient's office visit was converted to a virtual visit. Patient was contacted and agreed to proceed with a virtual visit via Telephone Visit  The risks and benefits of converting to a virtual visit were discussed in light of the current infectious disease epidemic. Patient also understood that insurance coverage and co-pays are up to their individual insurance plans. HPI:    Lucía Holland ginashruthi (:  1965) has requested an audio/video evaluation for the following concern(s):    Patient is coming in for walk-in follow-up. Patient had experienced some nausea, vomiting, diarrhea and some headaches about a week ago. Patient was tested for COVID which was negative. Patient does have respiratory issues that puts her at higher risk for complications if she does test positive for COVID. Patient reports now she does have some scratchiness in her throat however does not have any fevers, chills, nausea, vomiting, chest pain, shortness of breath, abdominal pain, change urination, or current stool changes. 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 Patient is concerned because due to her health issues she would like to protect herself and wear masks and/or PPE that will help to protect her better. However the nursing homes do not have this available for her. Patient would like a note stating that it is recommended that she can wear the masks. Patient is on day 12 of her quarantine. Will like to return to work on the . Review of Systems   Constitutional: Negative for activity change, appetite change, fatigue and fever. HENT: Negative for ear discharge, ear pain, nosebleeds, postnasal drip, sinus pressure, sinus pain, sore throat, tinnitus, trouble swallowing and voice change. Eyes: Negative for photophobia, discharge, redness and itching.    Respiratory: Negative Lip swelling    Azithromycin Rash   ,   Past Medical History:   Diagnosis Date    Allergic rhinitis     Anxiety disorder     Asthma, moderate persistent, poorly-controlled     intubated x 2013    Cleft lip     Divergent strabismus     Diverticulitis 2013    s/p partial colectomy, Dr Rom Resendiz Hyperlipidemia LDL goal <100     Hypertension     Obesity (BMI 30-39.9)    ,   Past Surgical History:   Procedure Laterality Date     SECTION      x 3    CLEFT LIP REPAIR      OTHER SURGICAL HISTORY  13    Exploratory laparotomy with sigmoid colectomy, drainage pelvic abscess and formation end descending colostomy    OTHER SURGICAL HISTORY  3/20/14    Exploratory lapartomy with extensived lysis of adhesions, takedown of colostmy with primary stapled anastomosis, rigid sigmoidoscopy   ,   Social History     Tobacco Use    Smoking status: Never Smoker    Smokeless tobacco: Never Used   Substance Use Topics    Alcohol use: No     Alcohol/week: 0.0 standard drinks     Comment: social    Drug use: No   ,   Family History   Problem Relation Age of Onset    Heart Failure Mother         dec age   [facility] Diabetes Mother     Cancer Sister         pancreas, dec age 40   ,   Immunization History   Administered Date(s) Administered    Hepatitis A 10/13/2015    Hepatitis B (Engerix-B) 10/13/2015    Influenza Virus Vaccine 10/13/2015    Pneumococcal Conjugate 7-valent (Boaz Schaumann) 2013   ,   Health Maintenance   Topic Date Due    Pneumococcal 0-64 years Vaccine (1 of 1 - PPSV23) 1971    DTaP/Tdap/Td vaccine (1 - Tdap) 1984    Shingles Vaccine (1 of 2) 2015    Colon cancer screen colonoscopy  2015    Breast cancer screen  2017    Cervical cancer screen  2018    A1C test (Diabetic or Prediabetic)  2018    Flu vaccine (Season Ended) 2020    Lipid screen  2020    Hepatitis C screen  Completed    HIV screen  Completed    Hepatitis A vaccine  Aged Out    Hepatitis B vaccine  Aged Out    Hib vaccine  Aged Out    Meningococcal (ACWY) vaccine  Aged Out       PHYSICAL EXAMINATION:  [ INSTRUCTIONS:  \"[x]\" Indicates a positive item  \"[]\" Indicates a negative item  -- DELETE ALL ITEMS NOT EXAMINED]  [x] Alert  [x] Oriented to person/place/time    [x] No apparent distress  [] Toxic appearing    [] Face flushed appearing [] Sclera clear  [] Lips are cyanotic      [x] Breathing appears normal  [] Appears tachypneic      [] Rash on visible skin    [] Cranial Nerves II-XII grossly intact    [] Motor grossly intact in visible upper extremities    [] Motor grossly intact in visible lower extremities    [x] Normal Mood  [] Anxious appearing    [] Depressed appearing  [] Confused appearing      [] Poor short term memory  [] Poor long term memory    [] OTHER:      Due to this being a TeleHealth encounter, evaluation of the following organ systems is limited: Vitals/Constitutional/EENT/Resp/CV/GI//MS/Neuro/Skin/Heme-Lymph-Imm. ASSESSMENT/PLAN:      1. Viral gastroenteritis  Has resolved. Does still have a scratchy throat. Advised to continue with social distancing nad quarantine until symptoms resolve. 2. Moderate persistent asthma without complication  Letter written for work. Patient to  the letter. Return in about 3 months (around 7/21/2020). An  electronic signature was used to authenticate this note. --Luis Enrique Dao MD on 4/21/2020 at 12:18 PM        Pursuant to the emergency declaration under the [facility], [facility] waiver authority and the [employer] Act, this Virtual  Visit was conducted, with patient's consent, to reduce the patient's risk of exposure to COVID-19 and provide continuity of care for an established patient.     Services were provided through a video synchronous discussion virtually to substitute for in-person clinic visit.    I spent greater than 11-20  minutes in this visit, with more than 50 % of the time devoted to the patient counseling regarding patients concerns, evaluation, prognosis, explanation of diagnosis, risks, and benefits of treatments.

## 2020-04-21 NOTE — LETTER
[facility]  [address] [address]  Phone: [phone]  Fax: [phone]    Erma Resendiz MD        April 21, 2020     Patient: Haleigh sarah   YOB: 1965   Date of Visit: 4/21/2020       To Whom it May Concern:    Candace sarah was seen in my clinic on 4/21/2020 for a medical condition. Patient may return to work on 4/28/20 as she  would have completed her quarantine and did not show any more signs or symptoms that may have related to the coronavirus. If you have any questions or concerns, please don't hesitate to call.     Sincerely,             Erma Resendiz MD

## 2020-04-21 NOTE — LETTER
[facility]  [address] [address]  Phone: [phone]  Fax: [phone]    Betito Wei MD        April 21, 2020     Patient: Angel sarah   YOB: 1965   Date of Visit: 4/21/2020       To Whom it May Concern:    Dk sarah was seen in my clinic on 4/21/2020 for a medical condition. Patient has a history of Moderate persistent asthma. 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         If you have any questions or concerns, please don't hesitate to call.     Sincerely,           Betito Wei MD

## 2020-06-17 RX ORDER — PREDNISONE 20 MG/1
TABLET ORAL
Qty: 10 TABLET | Refills: 0 | Status: SHIPPED | OUTPATIENT
Start: 2020-06-17 | End: 2020-07-06

## 2020-06-17 RX ORDER — BENZONATATE 100 MG/1
CAPSULE ORAL
Qty: 60 CAPSULE | Refills: 0 | Status: SHIPPED | OUTPATIENT
Start: 2020-06-17 | End: 2020-07-28 | Stop reason: SDUPTHER

## 2020-07-06 RX ORDER — PREDNISONE 20 MG/1
TABLET ORAL
Qty: 10 TABLET | Refills: 0 | Status: SHIPPED | OUTPATIENT
Start: 2020-07-06 | End: 2020-07-22

## 2020-07-06 NOTE — TELEPHONE ENCOUNTER
Pharmacy is requesting medication refill.  Please approve or deny this request.    Rx requested:  Requested Prescriptions     Pending Prescriptions Disp Refills    predniSONE (DELTASONE) 20 MG tablet [Pharmacy Med Name: PREDNISONE 20MG TABS] 10 tablet 0     Sig: TAKE TWO TABLETS BY MOUTH EVERY DAY FOR 5 DAYS         Last Office Visit:   12/13/2019      Next Visit Date:  Future Appointments   Date Time Provider Gela Ferreirai   7/20/2020  8:45 AM Rach Potts MD [address] [facility]

## 2020-07-22 ENCOUNTER — HOSPITAL ENCOUNTER (EMERGENCY)
Age: 55
Discharge: HOME | End: 2020-07-22

## 2020-07-22 ENCOUNTER — APPOINTMENT (OUTPATIENT)
Dept: GENERAL RADIOLOGY | Age: 55
End: 2020-07-22

## 2020-07-22 VITALS
SYSTOLIC BLOOD PRESSURE: 148 MMHG | OXYGEN SATURATION: 99 % | DIASTOLIC BLOOD PRESSURE: 103 MMHG | BODY MASS INDEX: 33.32 KG/M2 | HEIGHT: 65 IN | WEIGHT: 200 LBS | HEART RATE: 92 BPM | RESPIRATION RATE: 18 BRPM | TEMPERATURE: 97.8 F

## 2020-07-22 LAB
ALBUMIN SERPL-MCNC: 4.1 G/DL (ref 3.5–4.6)
ALP BLD-CCNC: 70 U/L (ref 40–130)
ALT SERPL-CCNC: 17 U/L (ref 0–33)
ANION GAP SERPL CALCULATED.3IONS-SCNC: 13 MEQ/L (ref 9–15)
AST SERPL-CCNC: 22 U/L (ref 0–35)
BASOPHILS ABSOLUTE: 0.1 K/UL (ref 0–0.2)
BASOPHILS RELATIVE PERCENT: 0.7 %
BILIRUB SERPL-MCNC: 0.6 MG/DL (ref 0.2–0.7)
BUN BLDV-MCNC: 8 MG/DL (ref 6–20)
CALCIUM SERPL-MCNC: 9.2 MG/DL (ref 8.5–9.9)
CHLORIDE BLD-SCNC: 102 MEQ/L (ref 95–107)
CO2: 26 MEQ/L (ref 20–31)
CREAT SERPL-MCNC: 0.76 MG/DL (ref 0.5–0.9)
EOSINOPHILS ABSOLUTE: 0.7 K/UL (ref 0–0.7)
EOSINOPHILS RELATIVE PERCENT: 10.1 %
GFR AFRICAN AMERICAN: >60
GFR NON-AFRICAN AMERICAN: >60
GLOBULIN: 2.6 G/DL (ref 2.3–3.5)
GLUCOSE BLD-MCNC: 142 MG/DL (ref 70–99)
HCT VFR BLD CALC: 41.3 % (ref 37–47)
HEMOGLOBIN: 13.8 G/DL (ref 12–16)
LYMPHOCYTES ABSOLUTE: 1.2 K/UL (ref 1–4.8)
LYMPHOCYTES RELATIVE PERCENT: 15.7 %
MCH RBC QN AUTO: 31.4 PG (ref 27–31.3)
MCHC RBC AUTO-ENTMCNC: 33.5 % (ref 33–37)
MCV RBC AUTO: 93.8 FL (ref 82–100)
MONOCYTES ABSOLUTE: 0.5 K/UL (ref 0.2–0.8)
MONOCYTES RELATIVE PERCENT: 7 %
NEUTROPHILS ABSOLUTE: 4.9 K/UL (ref 1.4–6.5)
NEUTROPHILS RELATIVE PERCENT: 66.5 %
PDW BLD-RTO: 17.6 % (ref 11.5–14.5)
PLATELET # BLD: 305 K/UL (ref 130–400)
POTASSIUM SERPL-SCNC: 3.8 MEQ/L (ref 3.4–4.9)
RBC # BLD: 4.4 M/UL (ref 4.2–5.4)
SODIUM BLD-SCNC: 141 MEQ/L (ref 135–144)
TOTAL PROTEIN: 6.7 G/DL (ref 6.3–8)
WBC # BLD: 7.4 K/UL (ref 4.8–10.8)

## 2020-07-22 PROCEDURE — 99285 EMERGENCY DEPT VISIT HI MDM: CPT

## 2020-07-22 PROCEDURE — 94640 AIRWAY INHALATION TREATMENT: CPT

## 2020-07-22 PROCEDURE — 71046 X-RAY EXAM CHEST 2 VIEWS: CPT

## 2020-07-22 PROCEDURE — 80053 COMPREHEN METABOLIC PANEL: CPT

## 2020-07-22 PROCEDURE — 85025 COMPLETE CBC W/AUTO DIFF WBC: CPT

## 2020-07-22 PROCEDURE — 96374 THER/PROPH/DIAG INJ IV PUSH: CPT

## 2020-07-22 PROCEDURE — 94761 N-INVAS EAR/PLS OXIMETRY MLT: CPT

## 2020-07-22 PROCEDURE — 36415 COLL VENOUS BLD VENIPUNCTURE: CPT

## 2020-07-22 PROCEDURE — 6360000002 HC RX W HCPCS: Performed by: PHYSICIAN ASSISTANT

## 2020-07-22 PROCEDURE — 6370000000 HC RX 637 (ALT 250 FOR IP): Performed by: PHYSICIAN ASSISTANT

## 2020-07-22 RX ORDER — PREDNISONE 20 MG/1
40 TABLET ORAL DAILY
Qty: 10 TABLET | Refills: 0 | Status: SHIPPED | OUTPATIENT
Start: 2020-07-22 | End: 2020-07-27

## 2020-07-22 RX ORDER — DOXYCYCLINE 100 MG/1
100 TABLET ORAL 2 TIMES DAILY
Qty: 20 TABLET | Refills: 0 | Status: SHIPPED | OUTPATIENT
Start: 2020-07-22 | End: 2020-08-01

## 2020-07-22 RX ORDER — METHYLPREDNISOLONE SODIUM SUCCINATE 125 MG/2ML
125 INJECTION, POWDER, LYOPHILIZED, FOR SOLUTION INTRAMUSCULAR; INTRAVENOUS ONCE
Status: COMPLETED | OUTPATIENT
Start: 2020-07-22 | End: 2020-07-22

## 2020-07-22 RX ORDER — IPRATROPIUM BROMIDE AND ALBUTEROL SULFATE 2.5; .5 MG/3ML; MG/3ML
1 SOLUTION RESPIRATORY (INHALATION) ONCE
Status: COMPLETED | OUTPATIENT
Start: 2020-07-22 | End: 2020-07-22

## 2020-07-22 RX ADMIN — IPRATROPIUM BROMIDE AND ALBUTEROL SULFATE 1 AMPULE: .5; 3 SOLUTION RESPIRATORY (INHALATION) at 07:42

## 2020-07-22 RX ADMIN — METHYLPREDNISOLONE SODIUM SUCCINATE 125 MG: 125 INJECTION, POWDER, FOR SOLUTION INTRAMUSCULAR; INTRAVENOUS at 06:59

## 2020-07-22 ASSESSMENT — ENCOUNTER SYMPTOMS
EYE DISCHARGE: 0
WHEEZING: 1
COLOR CHANGE: 0
VOMITING: 0
ABDOMINAL DISTENTION: 0
SORE THROAT: 0
ABDOMINAL PAIN: 0
DIARRHEA: 0
SHORTNESS OF BREATH: 1
RHINORRHEA: 0
COUGH: 1
CONSTIPATION: 0

## 2020-07-22 ASSESSMENT — PAIN DESCRIPTION - DESCRIPTORS: DESCRIPTORS: TIGHTNESS;ACHING

## 2020-07-22 ASSESSMENT — PAIN DESCRIPTION - LOCATION: LOCATION: RIB CAGE

## 2020-07-22 ASSESSMENT — PAIN SCALES - GENERAL: PAINLEVEL_OUTOF10: 6

## 2020-07-22 ASSESSMENT — PAIN DESCRIPTION - FREQUENCY: FREQUENCY: INTERMITTENT

## 2020-07-22 ASSESSMENT — PAIN DESCRIPTION - PAIN TYPE: TYPE: ACUTE PAIN

## 2020-07-22 NOTE — ED PROVIDER NOTES
[address] ED  eMERGENCY dEPARTMENT eNCOUnter      Pt Name: Zahira sarah  MRN: [id number]  [place] 1965  Date of evaluation: 7/22/2020  Provider: Antonio Oliva PA-C    CHIEF COMPLAINT       Chief Complaint   Patient presents with    Shortness of Breath         HISTORY OF PRESENT ILLNESS   (Location/Symptom, Timing/Onset,Context/Setting, Quality, Duration, Modifying Factors, Severity)  Note limiting factors. Zahira sarah is a 47 y.o. female who presents to the emergency department with a complaint of cough and shortness of breath which patient states is been ongoing for approximately last 1 week. She had spoke with her family doctor who called in a steroid pack for her, she states she felt better for a brief period of time but then her symptoms return. She does have past history of asthma and has been using her nebulizer. She states her last treatment was at 3 AM this morning. She states she still feels short of breath, and wheezy. She is had a cough which is dry and nonproductive, no fevers no body aches chills, no chest pain. Denies any pain at this time, 0 out of 10    HPI    NursingNotes were reviewed. REVIEW OF SYSTEMS    (2-9 systems for level 4, 10 or more for level 5)     Review of Systems   Constitutional: Negative for activity change and appetite change. HENT: Negative for congestion, ear discharge, ear pain, nosebleeds, rhinorrhea and sore throat. Eyes: Negative for discharge. Respiratory: Positive for cough, shortness of breath and wheezing. Cardiovascular: Negative for chest pain, palpitations and leg swelling. Gastrointestinal: Negative for abdominal distention, abdominal pain, constipation, diarrhea and vomiting. Genitourinary: Negative for difficulty urinating and dysuria. Musculoskeletal: Negative for arthralgias. Skin: Negative for color change, pallor, rash and wound.    Neurological: Negative for dizziness, tremors, syncope, weakness, numbness and headaches. Psychiatric/Behavioral: Negative for agitation and confusion. Except as noted above the remainder of the review of systems was reviewed and negative. PAST MEDICAL HISTORY     Past Medical History:   Diagnosis Date    Allergic rhinitis     Anxiety disorder     Asthma, moderate persistent, poorly-controlled     intubated x 2013    Cleft lip     Divergent strabismus     Diverticulitis 2013    s/p partial colectomy, Dr Scooby Cortes Hyperlipidemia LDL goal <100     Hypertension     Obesity (BMI 30-39. 9)          SURGICALHISTORY       Past Surgical History:   Procedure Laterality Date     SECTION      x 3    CLEFT LIP REPAIR      OTHER SURGICAL HISTORY  13    Exploratory laparotomy with sigmoid colectomy, drainage pelvic abscess and formation end descending colostomy    OTHER SURGICAL HISTORY  3/20/14    Exploratory lapartomy with extensived lysis of adhesions, takedown of colostmy with primary stapled anastomosis, rigid sigmoidoscopy         CURRENT MEDICATIONS       Previous Medications    ALBUTEROL (PROVENTIL) (2.5 MG/3ML) 0.083% NEBULIZER SOLUTION    USE 1 VIAL VIA NEBULIZER EVERY 6 HOURS AS NEEDED    ALBUTEROL SULFATE  (90 BASE) MCG/ACT INHALER    Inhale 2 puffs into the lungs 4 times daily as needed for Wheezing    AMLODIPINE (NORVASC) 5 MG TABLET    Take 1 tablet by mouth daily    BENZONATATE (TESSALON) 100 MG CAPSULE    TAKE ONE TO TWO CAPSULES BY MOUTH THREE TIMES A DAY AS NEEDED FOR COUGH    BUDESONIDE (PULMICORT) 0.25 MG/2ML NEBULIZER SUSPENSION    USE 1 VIAL VIA NEBULIZER TWICE DAILY    CETIRIZINE (ZYRTEC) 10 MG TABLET    TK 1 T PO QD    FAMOTIDINE (PEPCID) 40 MG TABLET    Take 0.5 tablets by mouth 2 times daily    FERROUS SULFATE (FE TABS) 325 (65 FE) MG EC TABLET    Take 1 tablet by mouth every morning (before breakfast)    FLUTICASONE (FLONASE) 50 MCG/ACT NASAL SPRAY    1 spray by Nasal route daily FLUTICASONE-SALMETEROL (ADVAIR DISKUS) 250-50 MCG/DOSE AEPB    INHALE 1 PUFF BY MOUTH INTO THE LUNGS TWICE DAILY    LORATADINE (CLARITIN) 10 MG TABLET    Take 1 tablet by mouth daily    METHYLPREDNISOLONE (MEDROL, AMBER,) 4 MG TABLET    Take by mouth. MONTELUKAST (SINGULAIR) 10 MG TABLET    TAKE 1 TABLET BY MOUTH EVERY NIGHT AT BEDTIME    PRAVASTATIN (PRAVACHOL) 20 MG TABLET    Take 1 tablet by mouth every evening    RESPIRATORY THERAPY SUPPLIES (NEBULIZER/TUBING/MOUTHPIECE) KIT    1 kit by Does not apply route daily as needed (use as directed)    SODIUM CHLORIDE (OCEAN) 0.65 % NASAL SPRAY    1 spray by Nasal route as needed for Congestion    SPACER/AERO-HOLDING CHAMBERS (E-Z SPACER) GASTON    1 Device by Does not apply route daily as needed       ALLERGIES     Bacitracin; Butalbital-aspirin-caffeine; Codeine; Floxin [ofloxacin]; Rocephin [ceftriaxone sodium]; Tomato; Asa [aspirin];  Lisinopril; and Azithromycin    FAMILY HISTORY       Family History   Problem Relation Age of Onset    Heart Failure Mother         dec age   [facility] Diabetes Mother     Cancer Sister         pancreas, dec age 40          SOCIAL HISTORY       Social History     Socioeconomic History    Marital status:      Spouse name: None    Number of children: 1    Years of education: None    Highest education level: None   Occupational History    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[employer]    Social Needs    Financial resource strain: None    Food insecurity     Worry: None     Inability: None    Transportation needs     Medical: None     Non-medical: None   Tobacco Use    Smoking status: Never Smoker    Smokeless tobacco: Never Used   Substance and Sexual Activity    Alcohol use: No     Alcohol/week: 0.0 standard drinks     Comment: social    Drug use: No    Sexual activity: None   Lifestyle    Physical activity     Days per week: None     Minutes per session: None    Stress: None   Relationships    Social connections Talks on phone: None     Gets together: None     Attends Anabaptist service: None     Active member of club or organization: None     Attends meetings of clubs or organizations: None     Relationship status: None    Intimate partner violence     Fear of current or ex partner: None     Emotionally abused: None     Physically abused: None     Forced sexual activity: None   Other Topics Concern    None   Social History Narrative    Born in [facility], one of 3     Dec 30, 2015 to a [place]    3 children on [facility]    Works in [employer] [address] 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[place] 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 and grandson    Hobbies cooking, family, bowling       [address]      @MSUO([id number])@      PHYSICAL EXAM    (up to 7 for level 4, 8 or more for level 5)     ED Triage Vitals [07/22/20 0622]   BP Temp Temp Source Pulse Resp SpO2 Height Weight   (!) 138/99 97.8 °F (36.6 °C) Temporal 96 18 98 % 5' 5\" (1.651 m) 200 lb (90.7 kg)       Physical Exam  Vitals signs and nursing note reviewed. Constitutional:       General: She is not in acute distress. Appearance: She is well-developed. She is not ill-appearing, toxic-appearing or diaphoretic. HENT:      Head: Normocephalic. Nose: No congestion. Mouth/Throat:      Mouth: Mucous membranes are moist.      Pharynx: No oropharyngeal exudate or posterior oropharyngeal erythema. Eyes:      Extraocular Movements: Extraocular movements intact. Conjunctiva/sclera: Conjunctivae normal.      Pupils: Pupils are equal, round, and reactive to light. Neck:      Musculoskeletal: Normal range of motion and neck supple. No neck rigidity. Vascular: No JVD. Trachea: No tracheal deviation. Cardiovascular:      Rate and Rhythm: Normal rate. Pulses: Normal pulses. Heart sounds: Normal heart sounds. No murmur. No friction rub. No gallop. Pulmonary:      Effort: Pulmonary effort is normal. No tachypnea, accessory muscle usage, respiratory distress or retractions. Breath sounds: No stridor. No wheezing, rhonchi or rales. Comments: Lung sounds are clear in the majority fields, patient does have some mild expiratory wheezes noted in the right upper lung fields, as well as the left lower lung fields. No rales or rhonchi heard no accessory muscle use, no retractions, room air saturations are 98%  Chest:      Chest wall: No tenderness. Abdominal:      General: Abdomen is flat. Bowel sounds are normal. There is no distension or abdominal bruit. Palpations: There is no shifting dullness, fluid wave, hepatomegaly, splenomegaly, mass or pulsatile mass. Tenderness: There is no abdominal tenderness. There is no right CVA tenderness, left CVA tenderness, guarding or rebound. Negative signs include Espinal's sign, Rovsing's sign and McBurney's sign. Musculoskeletal:         General: No deformity. Right lower leg: No edema. Left lower leg: No edema. Skin:     General: Skin is warm and dry. Capillary Refill: Capillary refill takes less than 2 seconds. Coloration: Skin is not jaundiced. Neurological:      General: No focal deficit present. Mental Status: She is alert and oriented to person, place, and time. Mental status is at baseline. Cranial Nerves: No cranial nerve deficit. Sensory: No sensory deficit. Motor: No weakness.       Coordination: Coordination normal.   Psychiatric:         Mood and Affect: Mood normal.         DIAGNOSTIC RESULTS     EKG: All EKG's are interpreted by the Emergency Department Physician who either signs or Co-signsthis chart in the absence of a cardiologist.        RADIOLOGY:   Marveen Brunner such as CT, Ultrasound and MRI are read by the radiologist. Plain radiographic images are visualized and preliminarily interpreted by the emergency physician with the below findings:    Chest x-ray shows no acute pulmonary process    Interpretation per the Radiologist below, if available at the time ofthis note:    XR CHEST (2 VW)    (Results Pending)         ED BEDSIDE ULTRASOUND:   Performed by ED Physician - none    LABS:  Labs Reviewed   COMPREHENSIVE METABOLIC PANEL - Abnormal; Notable for the following components:       Result Value    Glucose 142 (*)     All other components within normal limits   CBC WITH AUTO DIFFERENTIAL - Abnormal; Notable for the following components:    MCH 31.4 (*)     RDW 17.6 (*)     All other components within normal limits       All other labs were within normal range or not returned as of this dictation. EMERGENCY DEPARTMENT COURSE and DIFFERENTIAL DIAGNOSIS/MDM:   Vitals:    Vitals:    07/22/20 0622 07/22/20 0729   BP: (!) 138/99 (!) 148/103   Pulse: 96 92   Resp: 18 18   Temp: 97.8 °F (36.6 °C)    TempSrc: Temporal    SpO2: 98% 99%   Weight: 200 lb (90.7 kg)    Height: 5' 5\" (1.651 m)             MDM  Number of Diagnoses or Management Options  Mild persistent asthma with exacerbation:   Diagnosis management comments: Patient presented to the ED with a complaint of cough, and shortness of breath which she states been ongoing for approximately 1 week. She states her asthma has been flaring up and she has been using her nebulizer treatments without any sustained relief. She states she used 1 at 3 AM this morning when she had recurrent cough and shortness of breath again. Her cough is dry nonproductive there is no fevers. On arrival to the emerge department she appears in no acute distress, room air saturations are 99% she does have some mild scattered wheezes in the right upper lobe, as well as left lower lobe. Chest x-ray showed no acute pulmonary process, labs are within normal range. Patient was given a dose of Solu-Medrol and was feeling better, she did receive 1 additional breathing treatment prior to discharge. She was sent home with a prescription for prednisone for 5 days, as well as Zithromax.   She has a scheduled appointment with her regular family physician for this upcoming Friday she was advised to keep that appointment. She was advised if she has any worsening or changes symptoms return to the ER. CRITICAL CARE TIME   Total Critical Care time was 0 minutes, excluding separately reportableprocedures. There was a high probability of clinicallysignificant/life threatening deterioration in the patient's condition which required my urgent intervention. CONSULTS:  None    PROCEDURES:  Unless otherwise noted below, none     Procedures    FINAL IMPRESSION      1. Mild persistent asthma with exacerbation          DISPOSITION/PLAN   DISPOSITION        PATIENT REFERRED TO:  Apolonia Ventura MD  [address] [place] [address]  [phone]    In 2 days        DISCHARGE MEDICATIONS:  New Prescriptions    DOXYCYCLINE MONOHYDRATE (ADOXA) 100 MG TABLET    Take 1 tablet by mouth 2 times daily for 10 days May substitute another form of Doxycycline if insurance requires.     PREDNISONE (DELTASONE) 20 MG TABLET    Take 2 tablets by mouth daily for 5 days          (Please note that portions of this note were completed with a voice recognition program.  Efforts were made to edit the dictations but occasionally words are mis-transcribed.)    Antonio Oliva PA-C (electronically signed)  Attending Emergency Physician         Antonio Oliva PA-C  07/22/20 07

## 2020-07-22 NOTE — ED TRIAGE NOTES
Pt arrives from home with c/o asthma flare up. Pt states she has had a mild cold for a few days but her asthma got worse last night and this morning. Pt states she did a nebulizer treatment at 3 am and then two more this morning but she still feels like her chest is tight. resps even and unlabored. No resp distress noted. 98% on room air. Lung sounds expiratory wheezing. Pt alert and oriented x 4. Skin warm, dry. No distress noted at this time.

## 2020-07-22 NOTE — ED NOTES
Hand off report received from [facility]. Pt resting on cart. Respirations even and unlabored. PT denies needs. PT reports no relief of tightness/shortness of breath.      Jacob Nguyen, [place]  07/22/20 6175

## 2020-07-23 ENCOUNTER — CARE COORDINATION (OUTPATIENT)
Dept: CARE COORDINATION | Age: 55
End: 2020-07-23

## 2020-07-23 NOTE — CARE COORDINATION
Date/Time:  7/23/2020 10:11 AM  Attempted to reach patient by telephone. Left HIPPA compliant message requesting a return call. Will attempt to reach patient again.
breath, increasing fever and signs of decompensation with patient who verbalized understanding. Discussed exposure protocols and quarantine with CDC Guidelines What to do if you are sick with coronavirus disease 2019.  Patient was given an opportunity for questions and concerns. The patient agrees to contact the [facility] exposure line [phone], local [facility] department [place] [facility]: ([phone]) and PCP office for questions related to their healthcare. CTN/ACM provided contact information for future needs. Reviewed and educated patient on any new and changed medications related to discharge diagnosis     Patient/family/caregiver given information for [employer] Loop and agrees to enroll yes  Patient's preferred e-mail: [email or web address]   Patient's preferred phone number: [phone]  Based on Loop alert triggers, patient will be contacted by nurse care manager for worsening symptoms. Pt will be further monitored by COVID Loop Team based on severity of symptoms and risk factors.

## 2020-07-23 NOTE — CARE COORDINATION
Called patient to discuss concerns with food and rent assistance. Patient shared that she is not able to return to work due to being sick. Patient reports that she is worried about being behind on \"everything\". Explained to patient that there are resources on [facility]. Shared that  this writer can e-mail information on local food pantries and possible help for rent assistance. Patient verbally agreed with sending e-mail   with information. Sent e-mail  to: [email or web address]

## 2020-07-24 ENCOUNTER — OFFICE VISIT (OUTPATIENT)
Dept: FAMILY MEDICINE CLINIC | Age: 55
End: 2020-07-24
Payer: COMMERCIAL

## 2020-07-24 VITALS
WEIGHT: 213 LBS | OXYGEN SATURATION: 98 % | RESPIRATION RATE: 14 BRPM | SYSTOLIC BLOOD PRESSURE: 130 MMHG | HEART RATE: 100 BPM | BODY MASS INDEX: 35.49 KG/M2 | DIASTOLIC BLOOD PRESSURE: 82 MMHG | HEIGHT: 65 IN

## 2020-07-24 LAB
BILIRUBIN, POC: ABNORMAL
BLOOD URINE, POC: ABNORMAL
CLARITY, POC: CLEAR
COLOR, POC: YELLOW
FOLATE: 5.8 NG/ML (ref 7.3–26.1)
GLUCOSE URINE, POC: ABNORMAL
HBA1C MFR BLD: 6.8 %
KETONES, POC: ABNORMAL
LEUKOCYTE EST, POC: ABNORMAL
NITRITE, POC: ABNORMAL
PH, POC: 6
PROTEIN, POC: ABNORMAL
SPECIFIC GRAVITY, POC: 1025
UROBILINOGEN, POC: ABNORMAL
VITAMIN B-12: <150 PG/ML (ref 232–1245)
VITAMIN D 25-HYDROXY: 29.3 NG/ML (ref 30–100)

## 2020-07-24 PROCEDURE — 99214 OFFICE O/P EST MOD 30 MIN: CPT | Performed by: FAMILY MEDICINE

## 2020-07-24 PROCEDURE — 83036 HEMOGLOBIN GLYCOSYLATED A1C: CPT | Performed by: FAMILY MEDICINE

## 2020-07-24 PROCEDURE — 81003 URINALYSIS AUTO W/O SCOPE: CPT | Performed by: FAMILY MEDICINE

## 2020-07-24 RX ORDER — LANOLIN ALCOHOL/MO/W.PET/CERES
400 CREAM (GRAM) TOPICAL DAILY
Qty: 30 TABLET | Refills: 2 | Status: SHIPPED | OUTPATIENT
Start: 2020-07-24 | End: 2020-10-19 | Stop reason: SDUPTHER

## 2020-07-24 RX ORDER — POLYETHYLENE GLYCOL 3350 17 G/17G
17 POWDER, FOR SOLUTION ORAL DAILY PRN
Qty: 765 G | Refills: 1 | Status: SHIPPED | OUTPATIENT
Start: 2020-07-24 | End: 2020-08-23

## 2020-07-24 RX ORDER — UBIDECARENONE 75 MG
100 CAPSULE ORAL DAILY
Qty: 30 TABLET | Refills: 2 | Status: SHIPPED | OUTPATIENT
Start: 2020-07-24 | End: 2020-10-19 | Stop reason: SDUPTHER

## 2020-07-24 RX ORDER — POLYETHYLENE GLYCOL 3350 17 G/17G
17 POWDER, FOR SOLUTION ORAL DAILY PRN
Qty: 765 G | Refills: 1 | Status: SHIPPED | OUTPATIENT
Start: 2020-07-24 | End: 2020-07-24 | Stop reason: SDUPTHER

## 2020-07-24 NOTE — PROGRESS NOTES
Chief Complaint   Patient presents with    ED Follow-up     asthma    Urinary Tract Infection    Anxiety    Asthma        HPI: Khari sarah 47 y.o. female presenting for     Asthma   Patient is coming in with moderate persistent asthma. Patient reports that she was recently in the emergency department for an asthma flare. At that time patient was treated with Solu-Medrol and doxycycline. Patient reports that she still has intermittent wheezing but is slowly recovering. Patient admits to some shortness of breath. Feels that her flares are secondary to the masks that they have to wear. Patient reports that she tries to lower the mass to give some relief however needs a new machine. Old one broke. Needs a refill on the Advair. Patient has had a lot of issues with the mask mandate. Patient reports that she has to pull down her mask down to breathe but her job does not like that. Has to wear her mask at all time. Patient was recently at the emergency department for an asthma exacerbation. Patient was given solumedrol, doxycycline but reports that she is still wheezing and having issues. Patient reports that patient reports that she did not have a slip from her ED that he did not it was COVID and was unable to go back to work until her note said so. Allergic rhinitis  Patient takes Flonase, Zyrtec, saline spray as needed. Urinary tract infection  Patient reports that she has been having urinary tract infection. Patient reports that it has been going on for the last couple of days. Admits to foul odor. Admits to some subjective fevers. Hypertension  Patient is here for follow-up of elevated blood pressure. She is not exercising and is adherent to a low-salt diet. Blood pressure is not well controlled at home. Cardiac symptoms: none.  Patient denies chest pain, chest pressure/discomfort, claudication, dyspnea, fatigue, irregular heart beat, lower extremity edema, near-syncope, orthopnea, palpitations and paroxysmal nocturnal dyspnea. Cardiovascular risk factors: dyslipidemia, hypertension and sedentary lifestyle. Use of agents associated with hypertension: none. History of target organ damage: none. Patient needs a refill on her blood pressure medications and her statin. BP Readings from Last 20 Encounters:   07/24/20 130/82   07/22/20 (!) 148/103   04/09/20 120/86   12/13/19 130/82   06/04/19 (!) 140/90   05/17/19 (!) 140/100   11/13/18 (!) 148/98   09/17/18 (!) 142/100   08/19/18 (!) 131/94   06/28/18 120/80   11/27/17 120/80   10/25/17 135/72   08/01/17 (!) 140/93   05/20/17 126/88   01/24/17 120/82   10/31/16 128/80   11/21/15 130/84   07/01/15 128/88   09/24/14 122/90   06/24/14 130/80   ]    Constipation  Patient has a history of constipation. In the past was treated with  Colace. Reports that this does not help her. Would like to have a different agent. Not tried MiraLAX in the past. Milk of magnesia does help but only moves her bowels slowly. Patient only tried the medication once. Patient denies any blood in her stools. Denies any vomiting.        Current Outpatient Medications   Medication Sig Dispense Refill    nitrofurantoin, macrocrystal-monohydrate, (MACROBID) 100 MG capsule Take 1 capsule by mouth 2 times daily for 5 days 10 capsule 0    fluticasone-salmeterol (ADVAIR DISKUS) 500-50 MCG/DOSE diskus inhaler Inhale 1 puff into the lungs every 12 hours 60 each 3    polyethylene glycol (GLYCOLAX) 17 GM/SCOOP powder Take 17 g by mouth daily as needed (constipation) 765 g 1    vitamin B-12 (CYANOCOBALAMIN) 100 MCG tablet Take 1 tablet by mouth daily 30 tablet 2    folic acid (V-R FOLIC ACID) 363 MCG tablet Take 1 tablet by mouth daily 30 tablet 2    predniSONE (DELTASONE) 20 MG tablet Take 2 tablets by mouth daily for 5 days 10 tablet 0    doxycycline monohydrate (ADOXA) 100 MG tablet Take 1 tablet by mouth 2 times daily for 10 days May substitute another form of Doxycycline if insurance requires. 20 tablet 0    benzonatate (TESSALON) 100 MG capsule TAKE ONE TO TWO CAPSULES BY MOUTH THREE TIMES A DAY AS NEEDED FOR COUGH 60 capsule 0    methylPREDNISolone (MEDROL, AMBER,) 4 MG tablet Take by mouth. 1 kit 0    albuterol (PROVENTIL) (2.5 MG/3ML) 0.083% nebulizer solution USE 1 VIAL VIA NEBULIZER EVERY 6 HOURS AS NEEDED 450 mL 2    albuterol sulfate  (90 Base) MCG/ACT inhaler Inhale 2 puffs into the lungs 4 times daily as needed for Wheezing 1 Inhaler 2    loratadine (CLARITIN) 10 MG tablet Take 1 tablet by mouth daily 30 tablet 2    montelukast (SINGULAIR) 10 MG tablet TAKE 1 TABLET BY MOUTH EVERY NIGHT AT BEDTIME 30 tablet 2    famotidine (PEPCID) 40 MG tablet Take 0.5 tablets by mouth 2 times daily 30 tablet 2    ferrous sulfate (FE TABS) 325 (65 Fe) MG EC tablet Take 1 tablet by mouth every morning (before breakfast) 30 tablet 2    pravastatin (PRAVACHOL) 20 MG tablet Take 1 tablet by mouth every evening 30 tablet 2    amLODIPine (NORVASC) 5 MG tablet Take 1 tablet by mouth daily 30 tablet 2    budesonide (PULMICORT) 0.25 MG/2ML nebulizer suspension USE 1 VIAL VIA NEBULIZER TWICE DAILY 120 mL 2    Respiratory Therapy Supplies (NEBULIZER/TUBING/MOUTHPIECE) KIT 1 kit by Does not apply route daily as needed (use as directed) 1 kit 0    fluticasone (FLONASE) 50 MCG/ACT nasal spray 1 spray by Nasal route daily 2 Bottle 1    cetirizine (ZYRTEC) 10 MG tablet TK 1 T PO QD  0    sodium chloride (OCEAN) 0.65 % nasal spray 1 spray by Nasal route as needed for Congestion 1 Bottle 3    Spacer/Aero-Holding Chambers (E-Z SPACER) GASTON 1 Device by Does not apply route daily as needed 1 Device 0     No current facility-administered medications for this visit. ROS  CONSTITUTIONAL: The patient denies fevers, chills, sweats and body ache. HEENT: Denies headache, blurry vision, eye pain, tinnitus, vertigo,  sore throat, neck or thyroid masses.  Admits to n/v.  RESPIRATORY: reports cough, sputum, wheezing, denies hemoptysis. CARDIAC: Denies chest pain, pressure, palpitations, Denies lower extremity edema. GASTROINTESTINAL: Denies abdominal pain, reports  constipation, denies diarrhea, bleeding in the stools,   GENITOURINARY: Denies dysuria, hematuria, reports dysuria, and foul odor, denies urinary incontinence. NEUROLOGIC: Denies headaches, dizziness, syncope, weakness  MUSCULOSKELETAL: denies changes in range of motion, joint pain, stiffness. ENDOCRINOLOGY: Denies heat or cold intolerance. HEMATOLOGY: Denies easy bleeding or blood transfusion, reports anemia  DERMATOLOGY: Denies changes in moles or pigmentation changes. PSYCHIATRY: Denies depression, agitation, reports anxiety. Past Medical History:   Diagnosis Date    Allergic rhinitis     Anxiety disorder     Asthma, moderate persistent, poorly-controlled     intubated x 2013    Cleft lip     Divergent strabismus     Diverticulitis 2013    s/p partial colectomy, Dr Thi Capellan Hyperlipidemia LDL goal <100     Hypertension     Obesity (BMI 30-39. 9)     Type 2 diabetes mellitus ([facility] [address] 2020    A1C 6.8        Past Surgical History:   Procedure Laterality Date     SECTION      x 3    CLEFT LIP REPAIR      OTHER SURGICAL HISTORY  13    Exploratory laparotomy with sigmoid colectomy, drainage pelvic abscess and formation end descending colostomy    OTHER SURGICAL HISTORY  3/20/14    Exploratory lapartomy with extensived lysis of adhesions, takedown of colostmy with primary stapled anastomosis, rigid sigmoidoscopy        Family History   Problem Relation Age of Onset    Heart Failure Mother         dec age   [facility] Diabetes Mother     Cancer Sister         pancreas, dec age 40        Social History     Socioeconomic History    Marital status:      Spouse name: Not on file    Number of children: 1    Years of education: Not on file    Highest education level: Not on file   Occupational History    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[address] Financial resource strain: Not on file    Food insecurity     Worry: Not on file     Inability: Not on file    Transportation needs     Medical: Not on file     Non-medical: Not on file   Tobacco Use    Smoking status: Never Smoker    Smokeless tobacco: Never Used   Substance and Sexual Activity    Alcohol use: No     Alcohol/week: 0.0 standard drinks     Comment: social    Drug use: No    Sexual activity: Not on file   Lifestyle    Physical activity     Days per week: Not on file     Minutes per session: Not on file    Stress: Not on file   Relationships    Social connections     Talks on phone: Not on file     Gets together: Not on file     Attends Taoism service: Not on file     Active member of club or organization: Not on file     Attends meetings of clubs or organizations: Not on file     Relationship status: Not on file    Intimate partner violence     Fear of current or ex partner: Not on file     Emotionally abused: Not on file     Physically abused: Not on file     Forced sexual activity: Not on file   Other Topics Concern    Not on file   Social History Narrative    Born in 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    Hobbies cooking, family, bowling        /82   Pulse 100   Resp 14   Ht 5' 5\" (1.651 m)   Wt 213 lb (96.6 kg)   SpO2 98%   BMI 35.45 kg/m²        Physical Exam:    General appearance - alert, well appearing, and in no distress  Mental Status - alert, oriented to person, place, and time  Eyes - pupils equal and reactive, extraocular eye movements intact   Ears - bilateral TM's and external ear canals normal   Nose - normal and patent, no erythema, discharge or polyps   Sinuses - Normal paranasal sinuses without tenderness   Throat - mucous membranes moist, pharynx normal without lesions   Neck - supple, no significant adenopathy   Thyroid - thyroid is normal in size without nodules or tenderness    Chest -coarse breath sounds with expiratory wheezing bilaterally. No signs of focal consolidation. Heart - normal rate, regular rhythm, normal S1, S2, no murmurs, rubs, clicks or gallops  Abdomen - soft, nontender, nondistended, no masses or organomegaly   Back exam - full range of motion, no tenderness, palpable spasm or pain on motion   Neurological - alert, oriented, normal speech, no focal findings or movement disorder noted   Musculoskeletal - no joint tenderness, deformity or swelling   Extremities - peripheral pulses normal, no pedal edema, no clubbing or cyanosis   Skin - normal coloration and turgor, no rashes, no suspicious skin lesions noted    Labs   No results found for: TSHREFLEX  TSH   Date Value Ref Range Status   09/18/2014 1.240 0.270 - 4.200 uIU/mL Final   06/18/2014 2.000 0.270 - 4.200 uIU/mL Final   05/14/2014 3.270 uIU/mL Final   12/27/2013 0.509 0.270 - 4.200 uIU/mL Final     Comment:     Effective 12/4/2013  Methodology and/or Reference Range has changed.    12/01/2013 1.511 0.550 - 4.780 uIU/mL Final   03/30/2013 1.634 0.550 - 4.780 uIU/mL Final   02/07/2013 2.275 0.550 - 4.780 uIU/mL Final   01/14/2013 0.627 0.550 - 4.780 uIU/mL Final   04/03/2012 2.059 0.550 - 4.780 uIU/mL Final     Lab Results   Component Value Date     07/22/2020    K 3.8 07/22/2020     07/22/2020    CO2 26 07/22/2020    BUN 8 07/22/2020    CREATININE 0.76 07/22/2020    GLUCOSE 142 (H) 07/22/2020    CALCIUM 9.2 07/22/2020    PROT 6.7 07/22/2020    LABALBU 4.1 07/22/2020    BILITOT 0.6 07/22/2020    ALKPHOS 70 07/22/2020    AST 22 07/22/2020    ALT 17 07/22/2020    LABGLOM >60.0 07/22/2020    GFRAA >60.0 07/22/2020    GLOB 2.6 07/22/2020       Lab Results   Component Value Date    LABA1C 6.8 07/24/2020     No results found for: EAG  Lab Results   Component Value Date    TSH 1.240 09/18/2014           A/P: Mamta sarah 47 y.o. female presenting for     1. Urinary tract infection without hematuria, site unspecified  Obtain urine cultures and UA. POCT Urinalysis No Micro (Auto)  - nitrofurantoin, macrocrystal-monohydrate, (MACROBID) 100 MG capsule; Take 1 capsule by mouth 2 times daily for 5 days  Dispense: 10 capsule; Refill: 0  - Urinalysis; Future  - Culture, Urine; Future    2. Glucose found in urine on examination    - POCT glycosylated hemoglobin (Hb A1C)    3. Moderate persistent asthma with exacerbation  Increase Advair. Continue with her Pulmicort. Have patient follow-up in several days. - fluticasone-salmeterol (ADVAIR DISKUS) 500-50 MCG/DOSE diskus inhaler; Inhale 1 puff into the lungs every 12 hours  Dispense: 60 each; Refill: 3    4. Class 2 severe obesity due to excess calories with serious comorbidity and body mass index (BMI) of 35.0 to 35.9 in adult ([facility] [address]      5. Constipation, unspecified constipation type    - XR ABDOMEN (KUB) (SINGLE AP VIEW); Future  - polyethylene glycol (GLYCOLAX) 17 GM/SCOOP powder; Take 17 g by mouth daily as needed (constipation)  Dispense: 765 g; Refill: 1    6. Fatigue, unspecified type    - Vitamin D 25 Hydroxy; Future  - Vitamin B12 & Folate; Future    7. Type 2 diabetes  Just diagnosed with A1c. May be secondary to steroids. May need to monitor at this point in time. Will discuss with patient at visit coming up. Stressed patient should come in every 3 months. Is important to help with medication management.

## 2020-07-24 NOTE — LETTER
[facility]  [address] [address]  Phone: [phone]  Fax: [phone]    Usha Sahu MD        July 24, 2020     Patient: Dakota sarah   YOB: 1965   Date of Visit: 7/24/2020       To Whom it May Concern:    Gonzalez sarah was seen in my clinic on 7/24/2020 due to a medical condition not related to Lori. She may return to work on 7/28/20. If you have any questions or concerns, please don't hesitate to call.     Sincerely,           Usha Sahu MD

## 2020-07-26 RX ORDER — NITROFURANTOIN 25; 75 MG/1; MG/1
100 CAPSULE ORAL 2 TIMES DAILY
Qty: 10 CAPSULE | Refills: 0 | Status: SHIPPED | OUTPATIENT
Start: 2020-07-26 | End: 2020-07-31

## 2020-07-27 NOTE — PROGRESS NOTES
Chief Complaint   Patient presents with    Urinary Tract Infection        HPI: Zenobia sarah 47 y.o. female presenting for     Asthma   Patient is coming in with moderate persistent asthma. Patient reports that she was recently in the emergency department for an asthma flare. At that time patient was treated with Solu-Medrol and doxycycline. Patient reports that she still has intermittent wheezing but is slowly recovering. Patient admits to some shortness of breath. Feels that her flares are secondary to the masks that they have to wear. Patient reports that she tries to lower the mass to give some relief however needs a new machine. Old one broke. Needs a refill on the Advair. Patient has had a lot of issues with the mask mandate. Patient reports that she has to pull down her mask down to breathe but her job does not like that. Has to wear her mask at all time. Patient was recently at the emergency department for an asthma exacerbation. Patient was given solumedrol, doxycycline but reports that she is still wheezing and having issues. Patient reports that patient reports that she did not have a slip from her ED that he did not it was COVID and was unable to go back to work until her note said so. F/u    Patient reports that she is doing better with the increase dose of the Advair medication. Patient has finished her prednisone medication. Patient states that she has a slight wheeze but other that than just cough. Denies any fever, chills, nausea, vomiting, chest pain, shortness of breath, abdominal pain, changes in urination, or changes in stools. Patient reports that she needs a note written for work so that she can return. Patient denies any fevers, chills, nausea, vomiting, chest pain, shortness of breath, abdominal pain, change in nation, change in stools. Allergic rhinitis  Patient takes Flonase,  saline spray as needed. Patient would like her allergy medicine to be changed. 2    loratadine (CLARITIN) 10 MG tablet Take 1 tablet by mouth daily 30 tablet 2    montelukast (SINGULAIR) 10 MG tablet TAKE 1 TABLET BY MOUTH EVERY NIGHT AT BEDTIME 30 tablet 2    famotidine (PEPCID) 40 MG tablet Take 0.5 tablets by mouth 2 times daily 30 tablet 2    ferrous sulfate (FE TABS) 325 (65 Fe) MG EC tablet Take 1 tablet by mouth every morning (before breakfast) 30 tablet 2    pravastatin (PRAVACHOL) 20 MG tablet Take 1 tablet by mouth every evening 30 tablet 2    amLODIPine (NORVASC) 5 MG tablet Take 1 tablet by mouth daily 30 tablet 2    budesonide (PULMICORT) 0.25 MG/2ML nebulizer suspension USE 1 VIAL VIA NEBULIZER TWICE DAILY 120 mL 2    Respiratory Therapy Supplies (NEBULIZER/TUBING/MOUTHPIECE) KIT 1 kit by Does not apply route daily as needed (use as directed) 1 kit 0    fluticasone (FLONASE) 50 MCG/ACT nasal spray 1 spray by Nasal route daily 2 Bottle 1    cetirizine (ZYRTEC) 10 MG tablet TK 1 T PO QD  0    sodium chloride (OCEAN) 0.65 % nasal spray 1 spray by Nasal route as needed for Congestion 1 Bottle 3    Spacer/Aero-Holding Chambers (E-Z SPACER) GASTON 1 Device by Does not apply route daily as needed 1 Device 0     No current facility-administered medications for this visit. ROS  CONSTITUTIONAL: The patient denies fevers, chills, sweats and body ache. HEENT: Denies headache, blurry vision, eye pain, tinnitus, vertigo,  sore throat, neck or thyroid masses. RESPIRATORY: reports cough, sputum, wheezing, denies hemoptysis. CARDIAC: Denies chest pain, pressure, palpitations, Denies lower extremity edema. GASTROINTESTINAL: Denies abdominal pain, denies constipation, denies diarrhea, bleeding in the stools,   GENITOURINARY: Denies dysuria, hematuria, reports dysuria, and foul odor, denies urinary incontinence. NEUROLOGIC: Denies headaches, dizziness, syncope, weakness  MUSCULOSKELETAL: denies changes in range of motion, joint pain, stiffness.   ENDOCRINOLOGY: Denies heat or file   Lifestyle    Physical activity     Days per week: Not on file     Minutes per session: Not on file    Stress: Not on file   Relationships    Social connections     Talks on phone: Not on file     Gets together: Not on file     Attends Confucianist service: Not on file     Active member of club or organization: Not on file     Attends meetings of clubs or organizations: Not on file     Relationship status: Not on file    Intimate partner violence     Fear of current or ex partner: Not on file     Emotionally abused: Not on file     Physically abused: Not on file     Forced sexual activity: Not on file   Other Topics Concern    Not on file   Social History Narrative    Born in [facility], one of 3     Dec 30, 2015 to a [place]    3 children on [facility]    Works 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    Hobbies cooking, family, bowling        /82   Pulse 100   Temp 97.2 °F (36.2 °C)   Resp 16   Ht 5' 5\" (1.651 m)   Wt 213 lb (96.6 kg)   SpO2 97%   BMI 35.45 kg/m²        Physical Exam:    General appearance - alert, well appearing, and in no distress  Mental Status - alert, oriented to person, place, and time  Eyes - pupils equal and reactive, extraocular eye movements intact   Ears - bilateral TM's and external ear canals normal   Nose - normal and patent, no erythema, discharge or polyps   Sinuses - Normal paranasal sinuses without tenderness   Throat - mucous membranes moist, pharynx normal without lesions   Neck - supple, no significant adenopathy   Thyroid - thyroid is normal in size without nodules or tenderness    Chest -coarse breath sounds with expiratory wheezing bilaterally. No signs of focal consolidation.   Heart - normal rate, regular rhythm, normal S1, S2, no murmurs, rubs, clicks or gallops  Abdomen - soft, nontender, nondistended, no masses or organomegaly   Back exam - full range of motion, no tenderness, palpable spasm or pain on motion Neurological - alert, oriented, normal speech, no focal findings or movement disorder noted   Musculoskeletal - no joint tenderness, deformity or swelling   Extremities - peripheral pulses normal, no pedal edema, no clubbing or cyanosis   Skin - normal coloration and turgor, no rashes, no suspicious skin lesions noted    Labs   No results found for: TSHREFLEX  TSH   Date Value Ref Range Status   09/18/2014 1.240 0.270 - 4.200 uIU/mL Final   06/18/2014 2.000 0.270 - 4.200 uIU/mL Final   05/14/2014 3.270 uIU/mL Final   12/27/2013 0.509 0.270 - 4.200 uIU/mL Final     Comment:     Effective 12/4/2013  Methodology and/or Reference Range has changed. 12/01/2013 1.511 0.550 - 4.780 uIU/mL Final   03/30/2013 1.634 0.550 - 4.780 uIU/mL Final   02/07/2013 2.275 0.550 - 4.780 uIU/mL Final   01/14/2013 0.627 0.550 - 4.780 uIU/mL Final   04/03/2012 2.059 0.550 - 4.780 uIU/mL Final     Lab Results   Component Value Date     07/22/2020    K 3.8 07/22/2020     07/22/2020    CO2 26 07/22/2020    BUN 8 07/22/2020    CREATININE 0.76 07/22/2020    GLUCOSE 142 (H) 07/22/2020    CALCIUM 9.2 07/22/2020    PROT 6.7 07/22/2020    LABALBU 4.1 07/22/2020    BILITOT 0.6 07/22/2020    ALKPHOS 70 07/22/2020    AST 22 07/22/2020    ALT 17 07/22/2020    LABGLOM >60.0 07/22/2020    GFRAA >60.0 07/22/2020    GLOB 2.6 07/22/2020       Lab Results   Component Value Date    LABA1C 6.8 07/24/2020     No results found for: EAG  Lab Results   Component Value Date    TSH 1.240 09/18/2014           A/P: Kaitlynn Blackmon lodge 47 y.o. female presenting for     1. Seasonal allergic rhinitis, unspecified trigger    - fexofenadine (ALLEGRA) 180 MG tablet; Take 1 tablet by mouth daily  Dispense: 30 tablet; Refill: 3    2. Acute bronchitis, unspecified organism    - benzonatate (TESSALON) 100 MG capsule; TAKE ONE TO TWO CAPSULES BY MOUTH THREE TIMES A DAY AS NEEDED FOR COUGH  Dispense: 60 capsule; Refill: 0    3.  Urinary tract infection without hematuria, site unspecified  Take the Macrobid as prescribed. We will repeat her urine as she did not supply enough last time. 4. Constipation, unspecified constipation type  Symptoms has resolved. 5. Hypertension, unspecified type  Better controlled. 6. Class 2 severe obesity due to excess calories with serious comorbidity and body mass index (BMI) of 35.0 to 35.9 in adult ([facility] [address]  Counseled on weight and diet. 7. Moderate persistent asthma with exacerbation  Note written for work. Given patient's asthma seems to be triggered when the manner washes the masks with the patient's clothes the fibers of the close trigger her asthma. Patient seems to be doing better when she wears her own masks and gowns. Also with the increase in the maintenance inhaler seems to stabilize her symptoms. We will continue to monitor. I spent greater than 25 minutes in this visit, with more than 50 % of the time devoted to the patient counseling regarding patients concerns, evaluation, prognosis, explanation of diagnosis, risks, and benefits of treatments. Stressed patient should come in every 3 months. Is important to help with medication management.

## 2020-07-28 ENCOUNTER — OFFICE VISIT (OUTPATIENT)
Dept: FAMILY MEDICINE CLINIC | Age: 55
End: 2020-07-28
Payer: COMMERCIAL

## 2020-07-28 ENCOUNTER — TELEPHONE (OUTPATIENT)
Dept: FAMILY MEDICINE CLINIC | Age: 55
End: 2020-07-28

## 2020-07-28 VITALS
DIASTOLIC BLOOD PRESSURE: 82 MMHG | BODY MASS INDEX: 35.49 KG/M2 | HEIGHT: 65 IN | WEIGHT: 213 LBS | TEMPERATURE: 97.2 F | RESPIRATION RATE: 16 BRPM | SYSTOLIC BLOOD PRESSURE: 120 MMHG | OXYGEN SATURATION: 97 % | HEART RATE: 100 BPM

## 2020-07-28 DIAGNOSIS — N39.0 URINARY TRACT INFECTION WITHOUT HEMATURIA, SITE UNSPECIFIED: ICD-10-CM

## 2020-07-28 LAB
BILIRUBIN URINE: NEGATIVE
BLOOD, URINE: NEGATIVE
CLARITY: CLEAR
COLOR: YELLOW
GLUCOSE URINE: 500 MG/DL
KETONES, URINE: NEGATIVE MG/DL
LEUKOCYTE ESTERASE, URINE: NEGATIVE
NITRITE, URINE: NEGATIVE
PH UA: 5.5 (ref 5–9)
PROTEIN UA: NEGATIVE MG/DL
SPECIFIC GRAVITY UA: 1.02 (ref 1–1.03)
UROBILINOGEN, URINE: 1 E.U./DL

## 2020-07-28 PROCEDURE — 99214 OFFICE O/P EST MOD 30 MIN: CPT | Performed by: FAMILY MEDICINE

## 2020-07-28 RX ORDER — BENZONATATE 100 MG/1
CAPSULE ORAL
Qty: 60 CAPSULE | Refills: 0 | Status: SHIPPED | OUTPATIENT
Start: 2020-07-28 | End: 2020-10-19 | Stop reason: SDUPTHER

## 2020-07-28 RX ORDER — FEXOFENADINE HCL 180 MG/1
180 TABLET ORAL DAILY
Qty: 30 TABLET | Refills: 3 | Status: SHIPPED | OUTPATIENT
Start: 2020-07-28 | End: 2020-08-27

## 2020-07-28 RX ORDER — FEXOFENADINE HCL AND PSEUDOEPHEDRINE HCI 180; 240 MG/1; MG/1
1 TABLET, EXTENDED RELEASE ORAL DAILY
Qty: 30 TABLET | Refills: 0 | Status: SHIPPED
Start: 2020-07-28 | End: 2020-07-28 | Stop reason: DRUGHIGH

## 2020-07-28 NOTE — LETTER
[facility]  [address] [address]  Phone: [phone]  Fax: [phone]    Nae Mays MD        July 28, 2020     Patient: Esther sarah   YOB: 1965   Date of Visit: 7/28/2020       To Whom it May Concern:    Ofelia sarah was seen in my clinic on 7/28/2020 for an acute exacerbation of asthma. She may return to work on 7/29/2020 as patient does not display any signs or symptoms related to COVID-19. Patient has also been tested for COVID-19 recently and was negative. One of the triggers to patient's multiple exacerbations is the detergents/and certain fabrics used in the manor. Would recommend patient bring her own masks and gowns to work.           Sincerely,           Nae Mays MD

## 2020-07-28 NOTE — TELEPHONE ENCOUNTER
Please let patient know that the Allegra medication for her allergies is not covered by her insurance.

## 2020-07-28 NOTE — LETTER
[facility]  [address] [facility] [address]  Phone: [phone]  Fax: [phone]    Jelly Blanchard MD        July 28, 2020     Patient: Zahira sarah   YOB: 1965   Date of Visit: 7/28/2020       To Whom it May Concern:    Madie sarah was seen in my clinic on 7/28/2020 for an acute exacerbation of asthma. She may return to work on 7/29/2020 as patient does not display any signs or symptoms related to COVID-19. Patient has also been tested for COVID-19 recently and was negative. If you have any questions or concerns, please don't hesitate to call.     Sincerely,           Jelly Blanchard MD

## 2020-07-30 ENCOUNTER — TELEPHONE (OUTPATIENT)
Dept: FAMILY MEDICINE CLINIC | Age: 55
End: 2020-07-30

## 2020-07-30 LAB — URINE CULTURE, ROUTINE: NORMAL

## 2020-07-30 NOTE — TELEPHONE ENCOUNTER
Pt called office asking provider for letter for work stating she can be provided w/ disposable mask and gown due to allergy to chemicals. Pt states when letter is ready, she can pick it up tomorrow.     LOV 7/28/2020

## 2020-08-25 ENCOUNTER — TELEPHONE (OUTPATIENT)
Dept: FAMILY MEDICINE CLINIC | Age: 55
End: 2020-08-25

## 2020-08-25 RX ORDER — ALPRAZOLAM 0.5 MG/1
0.5 TABLET ORAL DAILY PRN
Qty: 30 TABLET | Refills: 2 | Status: SHIPPED | OUTPATIENT
Start: 2020-08-25 | End: 2020-12-15 | Stop reason: SDUPTHER

## 2020-08-25 RX ORDER — PREDNISONE 20 MG/1
40 TABLET ORAL DAILY
Qty: 10 TABLET | Refills: 0 | Status: SHIPPED | OUTPATIENT
Start: 2020-08-25 | End: 2020-09-21

## 2020-09-21 ENCOUNTER — TELEPHONE (OUTPATIENT)
Dept: FAMILY MEDICINE CLINIC | Age: 55
End: 2020-09-21

## 2020-09-21 RX ORDER — PREDNISONE 20 MG/1
TABLET ORAL
Qty: 10 TABLET | Refills: 0 | Status: SHIPPED | OUTPATIENT
Start: 2020-09-21 | End: 2020-10-19 | Stop reason: SDUPTHER

## 2020-09-21 NOTE — TELEPHONE ENCOUNTER
Gabriel needs medication for wheezing that she has had for a few weeks. She has been taking breathing treatments every day. Please send medication to [facility] in [place].

## 2020-10-01 ENCOUNTER — TELEPHONE (OUTPATIENT)
Dept: FAMILY MEDICINE CLINIC | Age: 55
End: 2020-10-01

## 2020-10-01 NOTE — TELEPHONE ENCOUNTER
I talked to the patient in regards to screening mammogram and she would like to wait until she has a different work schedule. She will call the office when she is ready to set this up.   (outreach)

## 2020-10-19 ENCOUNTER — VIRTUAL VISIT (OUTPATIENT)
Dept: FAMILY MEDICINE CLINIC | Age: 55
End: 2020-10-19
Payer: COMMERCIAL

## 2020-10-19 PROCEDURE — 99443 PR PHYS/QHP TELEPHONE EVALUATION 21-30 MIN: CPT | Performed by: FAMILY MEDICINE

## 2020-10-19 RX ORDER — PREDNISONE 20 MG/1
TABLET ORAL
Qty: 10 TABLET | Refills: 0 | Status: SHIPPED | OUTPATIENT
Start: 2020-10-19 | End: 2020-11-11

## 2020-10-19 RX ORDER — BENZONATATE 100 MG/1
CAPSULE ORAL
Qty: 60 CAPSULE | Refills: 5 | Status: SHIPPED | OUTPATIENT
Start: 2020-10-19 | End: 2021-02-10 | Stop reason: SDUPTHER

## 2020-10-19 RX ORDER — UBIDECARENONE 75 MG
100 CAPSULE ORAL DAILY
Qty: 30 TABLET | Refills: 2 | Status: SHIPPED | OUTPATIENT
Start: 2020-10-19 | End: 2020-11-11 | Stop reason: SDUPTHER

## 2020-10-19 RX ORDER — AMLODIPINE BESYLATE 5 MG/1
5 TABLET ORAL DAILY
Qty: 30 TABLET | Refills: 2 | Status: SHIPPED | OUTPATIENT
Start: 2020-10-19 | End: 2021-02-10 | Stop reason: SDUPTHER

## 2020-10-19 RX ORDER — LANOLIN ALCOHOL/MO/W.PET/CERES
400 CREAM (GRAM) TOPICAL DAILY
Qty: 30 TABLET | Refills: 2 | Status: SHIPPED | OUTPATIENT
Start: 2020-10-19 | End: 2021-02-10 | Stop reason: SDUPTHER

## 2020-10-19 RX ORDER — ALBUTEROL SULFATE 90 UG/1
2 AEROSOL, METERED RESPIRATORY (INHALATION) 4 TIMES DAILY PRN
Qty: 1 INHALER | Refills: 2 | Status: SHIPPED | OUTPATIENT
Start: 2020-10-19 | End: 2021-02-10 | Stop reason: SDUPTHER

## 2020-10-19 RX ORDER — DEXTROMETHORPHAN HYDROBROMIDE AND PROMETHAZINE HYDROCHLORIDE 15; 6.25 MG/5ML; MG/5ML
5 SYRUP ORAL 3 TIMES DAILY PRN
Qty: 105 ML | Refills: 0 | Status: SHIPPED | OUTPATIENT
Start: 2020-10-19 | End: 2021-10-08 | Stop reason: SDUPTHER

## 2020-10-19 RX ORDER — ALBUTEROL SULFATE 2.5 MG/3ML
SOLUTION RESPIRATORY (INHALATION)
Qty: 450 ML | Refills: 2 | Status: SHIPPED | OUTPATIENT
Start: 2020-10-19 | End: 2021-02-10 | Stop reason: SDUPTHER

## 2020-10-19 RX ORDER — AMOXICILLIN 875 MG/1
875 TABLET, COATED ORAL 2 TIMES DAILY
Qty: 14 TABLET | Refills: 0 | Status: SHIPPED | OUTPATIENT
Start: 2020-10-19 | End: 2020-10-26

## 2020-10-19 RX ORDER — PRAVASTATIN SODIUM 20 MG
20 TABLET ORAL EVERY EVENING
Qty: 30 TABLET | Refills: 2 | Status: SHIPPED | OUTPATIENT
Start: 2020-10-19 | End: 2021-02-10 | Stop reason: SDUPTHER

## 2020-10-19 NOTE — PROGRESS NOTES
No chief complaint on file. TELEHEALTH EVALUATION -- Audio/Visual (During [facility]-21 public health emergency)    Due to COVID 19 outbreak, patient's office visit was converted to a virtual visit. Patient was contacted and agreed to proceed with a virtual visit via Telephone Visit  The risks and benefits of converting to a virtual visit were discussed in light of the current infectious disease epidemic. Patient also understood that insurance coverage and co-pays are up to their individual insurance plans. HPI: David sarah 47 y.o. female presenting for     Cough  Patient complains of nasal congestion and productive cough. Symptoms began 1 weeks ago. Symptoms have been gradually worsening since that time. Patient denies any sick contacts. Associated symptoms include: shortness of breath, sputum production and wheezing. Patient does not have new pets. Patient does have a history of asthma. Patient does have a history of environmental allergens. Patient has not traveled recently. Patient does not have a history of smoking. Patient has not had a previous chest x-ray. Patient has not had a PPD done. Admits to feeling her chest tight and wheezing. Admits to thick phlegm. Admits to chills but no fevers. Patient has been compliant with her asthma medications. Patient reports that its feeling its exacerbated with the mask. Patient was unsure if it was anxiety or the panic attacks. Patinet denies any fever, chills, nausea, vomiting, chest pain, changes in urination or change in stools. Hypertension  Has been stable at work. She is not exercising and is adherent to a low-salt diet. Blood pressure is not well controlled at home. Cardiac symptoms: none. Patient denies chest pain, chest pressure/discomfort, claudication, dyspnea, fatigue, irregular heart beat, lower extremity edema, near-syncope, orthopnea, palpitations and paroxysmal nocturnal dyspnea.  Cardiovascular risk factors: dyslipidemia, hypertension and sedentary lifestyle. Use of agents associated with hypertension: none. History of target organ damage: none. Patient needs a refill on her blood pressure medications and her statin. BP Readings from Last 20 Encounters:   07/28/20 120/82   07/24/20 130/82   07/22/20 (!) 148/103   04/09/20 120/86   12/13/19 130/82   06/04/19 (!) 140/90   05/17/19 (!) 140/100   11/13/18 (!) 148/98   09/17/18 (!) 142/100   08/19/18 (!) 131/94   06/28/18 120/80   11/27/17 120/80   10/25/17 135/72   08/01/17 (!) 140/93   05/20/17 126/88   01/24/17 120/82   10/31/16 128/80   11/21/15 130/84   07/01/15 128/88   09/24/14 122/90   ]    Hyperlipidemia  Patient takes pravastatin daily. No issues or concerns. Like refill on the medication.   Lab Results   Component Value Date    CHOL 240 (H) 11/27/2017    CHOL 244 (H) 01/24/2017    CHOL 259 (H) 11/21/2015     Lab Results   Component Value Date    TRIG 61 11/27/2017    TRIG 93 01/24/2017    TRIG 97 11/21/2015     Lab Results   Component Value Date    HDL 55 12/13/2019    HDL 76 (H) 11/27/2017    HDL 77 (H) 01/24/2017     Lab Results   Component Value Date    LDLCALC 147 (H) 12/13/2019    LDLCALC 152 (H) 11/27/2017    LDLCALC 148 (H) 01/24/2017     No results found for: LABVLDL, VLDL  Lab Results   Component Value Date    CHOLHDLRATIO 4.3 02/07/2013    CHOLHDLRATIO 3.9 01/14/2013    CHOLHDLRATIO 4.0 04/03/2012               Current Outpatient Medications   Medication Sig Dispense Refill    predniSONE (DELTASONE) 20 MG tablet TAKE TWO TABLETS BY MOUTH EVERY DAY FOR 5 DAYS 10 tablet 0    benzonatate (TESSALON) 100 MG capsule TAKE ONE TO TWO CAPSULES BY MOUTH THREE TIMES A DAY AS NEEDED FOR COUGH 60 capsule 0    fluticasone-salmeterol (ADVAIR DISKUS) 500-50 MCG/DOSE diskus inhaler Inhale 1 puff into the lungs every 12 hours 60 each 3    vitamin B-12 (CYANOCOBALAMIN) 100 MCG tablet Take 1 tablet by mouth daily 30 tablet 2    folic acid (V-R FOLIC ACID) 689 MCG tablet Take 1 tablet by mouth daily 30 tablet 2    methylPREDNISolone (MEDROL, MABER,) 4 MG tablet Take by mouth. 1 kit 0    albuterol (PROVENTIL) (2.5 MG/3ML) 0.083% nebulizer solution USE 1 VIAL VIA NEBULIZER EVERY 6 HOURS AS NEEDED 450 mL 2    albuterol sulfate  (90 Base) MCG/ACT inhaler Inhale 2 puffs into the lungs 4 times daily as needed for Wheezing 1 Inhaler 2    loratadine (CLARITIN) 10 MG tablet Take 1 tablet by mouth daily 30 tablet 2    montelukast (SINGULAIR) 10 MG tablet TAKE 1 TABLET BY MOUTH EVERY NIGHT AT BEDTIME 30 tablet 2    famotidine (PEPCID) 40 MG tablet Take 0.5 tablets by mouth 2 times daily 30 tablet 2    ferrous sulfate (FE TABS) 325 (65 Fe) MG EC tablet Take 1 tablet by mouth every morning (before breakfast) 30 tablet 2    pravastatin (PRAVACHOL) 20 MG tablet Take 1 tablet by mouth every evening 30 tablet 2    amLODIPine (NORVASC) 5 MG tablet Take 1 tablet by mouth daily 30 tablet 2    budesonide (PULMICORT) 0.25 MG/2ML nebulizer suspension USE 1 VIAL VIA NEBULIZER TWICE DAILY 120 mL 2    Respiratory Therapy Supplies (NEBULIZER/TUBING/MOUTHPIECE) KIT 1 kit by Does not apply route daily as needed (use as directed) 1 kit 0    fluticasone (FLONASE) 50 MCG/ACT nasal spray 1 spray by Nasal route daily 2 Bottle 1    cetirizine (ZYRTEC) 10 MG tablet TK 1 T PO QD  0    sodium chloride (OCEAN) 0.65 % nasal spray 1 spray by Nasal route as needed for Congestion 1 Bottle 3    Spacer/Aero-Holding Chambers (E-Z SPACER) GASTON 1 Device by Does not apply route daily as needed 1 Device 0     No current facility-administered medications for this visit. ROS  CONSTITUTIONAL: The patient denies fevers, chills, sweats and body ache. HEENT: Denies headache, blurry vision, eye pain, tinnitus, vertigo,  sore throat, neck or thyroid masses. Admits to n/v.  RESPIRATORY: reports cough, sputum, denies hemoptysis.   CARDIAC: Denies chest pain, pressure, palpitations, Denies lower extremity edema.  GASTROINTESTINAL: Denies abdominal pain, reports  constipation, denies diarrhea, bleeding in the stools,   GENITOURINARY: Denies dysuria, hematuria, nocturia or frequency, urinary incontinence. NEUROLOGIC: Denies headaches, dizziness, syncope, weakness  MUSCULOSKELETAL: denies changes in range of motion, joint pain, stiffness. ENDOCRINOLOGY: Denies heat or cold intolerance. HEMATOLOGY: Denies easy bleeding or blood transfusion, reports anemia  DERMATOLOGY: Denies changes in moles or pigmentation changes. PSYCHIATRY: Denies depression, agitation or anxiety. Past Medical History:   Diagnosis Date    Allergic rhinitis     Anxiety disorder     Asthma, moderate persistent, poorly-controlled     intubated x 2013    Cleft lip     Divergent strabismus     Diverticulitis 2013    s/p partial colectomy, Dr Bob Leader Hyperlipidemia LDL goal <100     Hypertension     Obesity (BMI 30-39. 9)     Type 2 diabetes mellitus ([facility] [address] 2020    A1C 6.8        Past Surgical History:   Procedure Laterality Date     SECTION      x 3    CLEFT LIP REPAIR      OTHER SURGICAL HISTORY  13    Exploratory laparotomy with sigmoid colectomy, drainage pelvic abscess and formation end descending colostomy    OTHER SURGICAL HISTORY  3/20/14    Exploratory lapartomy with extensived lysis of adhesions, takedown of colostmy with primary stapled anastomosis, rigid sigmoidoscopy        Family History   Problem Relation Age of Onset    Heart Failure Mother         dec age   [employer] Diabetes Mother     Cancer Sister         pancreas, dec age 40        Social History     Socioeconomic History    Marital status:      Spouse name: Not on file    Number of children: 1    Years of education: Not on file    Highest education level: Not on file   Occupational History    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[employer]    Social Needs    Financial resource strain: Not on file    Food insecurity Worry: Not on file     Inability: Not on file    Transportation needs     Medical: Not on file     Non-medical: Not on file   Tobacco Use    Smoking status: Never Smoker    Smokeless tobacco: Never Used   Substance and Sexual Activity    Alcohol use: No     Alcohol/week: 0.0 standard drinks     Comment: social    Drug use: No    Sexual activity: Not on file   Lifestyle    Physical activity     Days per week: Not on file     Minutes per session: Not on file    Stress: Not on file   Relationships    Social connections     Talks on phone: Not on file     Gets together: Not on file     Attends Orthodoxy service: Not on file     Active member of club or organization: Not on file     Attends meetings of clubs or organizations: Not on file     Relationship status: Not on file    Intimate partner violence     Fear of current or ex partner: Not on file     Emotionally abused: Not on file     Physically abused: Not on file     Forced sexual activity: Not on file   Other Topics Concern    Not on file   Social History Narrative    Born in [facility] [place] [facility], one of 3     Dec 30, 2015 to a [place]    3 children on [facility]    Works in [employer] [address] [employer] 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[place] 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 and grandson    Hobbies cooking, family, bowling        There were no vitals taken for this visit.        PHYSICAL EXAMINATION:  [ INSTRUCTIONS:  \"[x]\" Indicates a positive item  \"[]\" Indicates a negative item  -- DELETE ALL ITEMS NOT EXAMINED]  [x] Alert  [x] Oriented to person/place/time    [x] No apparent distress  [] Toxic appearing    [] Face flushed appearing [] Sclera clear  [] Lips are cyanotic      [x] Breathing appears normal  [] Appears tachypneic      [] Rash on visible skin    [] Cranial Nerves II-XII grossly intact    [] Motor grossly intact in visible upper extremities    [] Motor grossly intact in visible lower extremities    [x] Normal Mood  [] Anxious appearing    [] Depressed appearing  [] Confused appearing      [] Poor short term memory  [] Poor long term memory    [] OTHER:      Due to this being a TeleHealth encounter, evaluation of the following organ systems is limited: Vitals/Constitutional/EENT/Resp/CV/GI//MS/Neuro/Skin/Heme-Lymph-Imm. ASSESSMENT/PLAN:  1. Acute bronchitis, unspecified organism    - predniSONE (DELTASONE) 20 MG tablet; TAKE TWO TABLETS BY MOUTH EVERY DAY FOR 5 DAYS  Dispense: 10 tablet; Refill: 0  - benzonatate (TESSALON) 100 MG capsule; TAKE ONE TO TWO CAPSULES BY MOUTH THREE TIMES A DAY AS NEEDED FOR COUGH  Dispense: 60 capsule; Refill: 5  - promethazine-dextromethorphan (PROMETHAZINE-DM) 6.25-15 MG/5ML syrup; Take 5 mLs by mouth 3 times daily as needed for Cough  Dispense: 105 mL; Refill: 0  - amoxicillin (AMOXIL) 875 MG tablet; Take 1 tablet by mouth 2 times daily for 7 days  Dispense: 14 tablet; Refill: 0    2. Moderate persistent asthma with exacerbation    - fluticasone-salmeterol (ADVAIR DISKUS) 500-50 MCG/DOSE diskus inhaler; Inhale 1 puff into the lungs every 12 hours  Dispense: 60 each; Refill: 3  - albuterol (PROVENTIL) (2.5 MG/3ML) 0.083% nebulizer solution; USE 1 VIAL VIA NEBULIZER EVERY 6 HOURS AS NEEDED  Dispense: 450 mL; Refill: 2    3. Hypertension, unspecified type    - albuterol sulfate  (90 Base) MCG/ACT inhaler; Inhale 2 puffs into the lungs 4 times daily as needed for Wheezing  Dispense: 1 Inhaler; Refill: 2    4. Essential hypertension    - pravastatin (PRAVACHOL) 20 MG tablet; Take 1 tablet by mouth every evening  Dispense: 30 tablet; Refill: 2  - amLODIPine (NORVASC) 5 MG tablet; Take 1 tablet by mouth daily  Dispense: 30 tablet; Refill: 2    5. Hyperlipidemia, unspecified hyperlipidemia type    - pravastatin (PRAVACHOL) 20 MG tablet; Take 1 tablet by mouth every evening  Dispense: 30 tablet; Refill: 2        An  electronic signature was used to authenticate this note.             Pursuant to the emergency declaration under the [address] [place], [place] [address]vard Act, 1135 waiver authority and the Coronavirus Preparedness and Response Supplemental Appropriations Act, this Virtual  Visit was conducted, with patient's consent, to reduce the patient's risk of exposure to COVID-19 and provide continuity of care for an established patient. Services were provided through a video synchronous discussion virtually to substitute for in-person clinic visit. Mana sarah is a 47 y.o. female evaluated via telephone on 10/19/2020. Consent:  She and/or health care decision maker is aware that that she may receive a bill for this telephone service, depending on her insurance coverage, and has provided verbal consent to proceed: Yes      Documentation:  I communicated with the patient and/or health care decision maker about chronic health issues . Details of this discussion including any medical advice provided: yes      I affirm this is a Patient Initiated Episode with a Patient who has not had a related appointment within my department in the past 7 days or scheduled within the next 24 hours. Patient identification was verified at the start of the visit: Yes    Total Time: minutes: 11-20 minutes    Note: not billable if this call serves to triage the patient into an appointment for the relevant concern      Sheree Garcia       Stressed patient should come in every 3 months. Is important to help with medication management.

## 2020-11-11 ENCOUNTER — TELEPHONE (OUTPATIENT)
Dept: FAMILY MEDICINE CLINIC | Age: 55
End: 2020-11-11

## 2020-11-11 ENCOUNTER — OFFICE VISIT (OUTPATIENT)
Dept: FAMILY MEDICINE CLINIC | Age: 55
End: 2020-11-11
Payer: COMMERCIAL

## 2020-11-11 VITALS
BODY MASS INDEX: 34.26 KG/M2 | HEART RATE: 120 BPM | HEIGHT: 65 IN | WEIGHT: 205.6 LBS | SYSTOLIC BLOOD PRESSURE: 138 MMHG | TEMPERATURE: 98.2 F | OXYGEN SATURATION: 96 % | DIASTOLIC BLOOD PRESSURE: 90 MMHG

## 2020-11-11 DIAGNOSIS — R73.01 IFG (IMPAIRED FASTING GLUCOSE): ICD-10-CM

## 2020-11-11 LAB
CREATININE URINE: 83.4 MG/DL
MICROALBUMIN UR-MCNC: <1.2 MG/DL
MICROALBUMIN/CREAT UR-RTO: NORMAL MG/G (ref 0–30)

## 2020-11-11 PROCEDURE — 99214 OFFICE O/P EST MOD 30 MIN: CPT | Performed by: FAMILY MEDICINE

## 2020-11-11 RX ORDER — PREDNISONE 20 MG/1
40 TABLET ORAL DAILY
Qty: 10 TABLET | Refills: 0 | Status: SHIPPED | OUTPATIENT
Start: 2020-11-11 | End: 2020-12-15 | Stop reason: SDUPTHER

## 2020-11-11 RX ORDER — UBIDECARENONE 75 MG
100 CAPSULE ORAL DAILY
Qty: 30 TABLET | Refills: 2 | Status: SHIPPED | OUTPATIENT
Start: 2020-11-11 | End: 2021-02-10 | Stop reason: SDUPTHER

## 2020-11-11 RX ORDER — SULFAMETHOXAZOLE AND TRIMETHOPRIM 800; 160 MG/1; MG/1
1 TABLET ORAL 2 TIMES DAILY
Qty: 20 TABLET | Refills: 0 | Status: SHIPPED | OUTPATIENT
Start: 2020-11-11 | End: 2020-11-21

## 2020-11-11 RX ORDER — DOXYCYCLINE HYCLATE 100 MG
100 TABLET ORAL 2 TIMES DAILY
Qty: 20 TABLET | Refills: 0 | Status: SHIPPED | OUTPATIENT
Start: 2020-11-11 | End: 2020-11-11

## 2020-11-11 ASSESSMENT — PATIENT HEALTH QUESTIONNAIRE - PHQ9
SUM OF ALL RESPONSES TO PHQ QUESTIONS 1-9: 1
SUM OF ALL RESPONSES TO PHQ QUESTIONS 1-9: 1
1. LITTLE INTEREST OR PLEASURE IN DOING THINGS: 0
SUM OF ALL RESPONSES TO PHQ QUESTIONS 1-9: 1
SUM OF ALL RESPONSES TO PHQ9 QUESTIONS 1 & 2: 1
2. FEELING DOWN, DEPRESSED OR HOPELESS: 1

## 2020-11-11 NOTE — LETTER
[facility]  [address] [facility] [address]  Phone: [phone]  Fax: [phone]    Manish Young MD        November 11, 2020     Patient: Calvin sarah   YOB: 1965   Date of Visit: 11/11/2020       To Whom it May Concern:    Stephanie sarah was seen in my clinic on 11/11/2020 for a medical condition. I would recommend patient return back to work on 11/16/2020 and pending COVID results. If you have any questions or concerns, please don't hesitate to call.     Sincerely,           Manish Young MD

## 2020-11-11 NOTE — PROGRESS NOTES
Chief Complaint   Patient presents with    Annual Exam     Has a form that needs to be completed.  Asthma     States she has been having trouble for the past couple of weeks. States she has to wear a mask at work which has been making things worse for her. Would like to discuss this. HPI: Shayy sarah 47 y.o. female presenting for         Shortness of Breath  Patient is coming in with moderate persistent asthma. Patient reports that her symptoms do not feel the same as her typical asthma flares. Admits to a cough and wheezing. Denies any fevers. Patient works at the [employer] where several worker are out sick. Her daughter is also sick (she works on the [employer]). Both and daughter were tested for COVID on Monday and are still waiting for the results. At that time patient was treated with Solu-Medrol and doxycycline. Patient reports that she still has intermittent wheezing but is slowly recovering. Patient admits to some shortness of breath. Feels that her flares are secondary to the masks that they have to wear. Patient reports that she tries to lower the mass to give some relief however needs a new machine. Old one broke. Needs a refill on the Advair. Patient has had a lot of issues with the mask mandate. Patient reports that she has to pull down her mask down to breathe but her job does not like that. Has to wear her mask at all time. Patient was recently at the emergency department for an asthma exacerbation. Patient was given solumedrol, doxycycline but reports that she is still wheezing and having issues. Patient reports that patient reports that she did not have a slip from her ED that he did not it was COVID and was unable to go back to work until her note said so.      Current Outpatient Medications   Medication Sig Dispense Refill    vitamin B-12 (CYANOCOBALAMIN) 100 MCG tablet Take 1 tablet by mouth daily 30 tablet 2    predniSONE (DELTASONE) 20 MG tablet Take 2 tablets by mouth daily for 5 days 10 tablet 0    sulfamethoxazole-trimethoprim (BACTRIM DS;SEPTRA DS) 800-160 MG per tablet Take 1 tablet by mouth 2 times daily for 10 days 20 tablet 0    benzonatate (TESSALON) 100 MG capsule TAKE ONE TO TWO CAPSULES BY MOUTH THREE TIMES A DAY AS NEEDED FOR COUGH 60 capsule 5    fluticasone-salmeterol (ADVAIR DISKUS) 500-50 MCG/DOSE diskus inhaler Inhale 1 puff into the lungs every 12 hours 60 each 3    folic acid (V-R FOLIC ACID) 347 MCG tablet Take 1 tablet by mouth daily 30 tablet 2    albuterol (PROVENTIL) (2.5 MG/3ML) 0.083% nebulizer solution USE 1 VIAL VIA NEBULIZER EVERY 6 HOURS AS NEEDED 450 mL 2    albuterol sulfate  (90 Base) MCG/ACT inhaler Inhale 2 puffs into the lungs 4 times daily as needed for Wheezing 1 Inhaler 2    pravastatin (PRAVACHOL) 20 MG tablet Take 1 tablet by mouth every evening 30 tablet 2    amLODIPine (NORVASC) 5 MG tablet Take 1 tablet by mouth daily 30 tablet 2    loratadine (CLARITIN) 10 MG tablet Take 1 tablet by mouth daily 30 tablet 2    montelukast (SINGULAIR) 10 MG tablet TAKE 1 TABLET BY MOUTH EVERY NIGHT AT BEDTIME 30 tablet 2    famotidine (PEPCID) 40 MG tablet Take 0.5 tablets by mouth 2 times daily 30 tablet 2    ferrous sulfate (FE TABS) 325 (65 Fe) MG EC tablet Take 1 tablet by mouth every morning (before breakfast) 30 tablet 2    budesonide (PULMICORT) 0.25 MG/2ML nebulizer suspension USE 1 VIAL VIA NEBULIZER TWICE DAILY 120 mL 2    Respiratory Therapy Supplies (NEBULIZER/TUBING/MOUTHPIECE) KIT 1 kit by Does not apply route daily as needed (use as directed) 1 kit 0    fluticasone (FLONASE) 50 MCG/ACT nasal spray 1 spray by Nasal route daily 2 Bottle 1    Spacer/Aero-Holding Chambers (E-Z SPACER) GASTON 1 Device by Does not apply route daily as needed 1 Device 0    cetirizine (ZYRTEC) 10 MG tablet TK 1 T PO QD  0     No current facility-administered medications for this visit.          ROS  CONSTITUTIONAL: The patient denies fevers, chills, sweats and body ache. HEENT: Denies headache, blurry vision, eye pain, tinnitus, vertigo,  sore throat, neck or thyroid masses. RESPIRATORY: reports cough, sputum, wheezing, denies hemoptysis. CARDIAC: Denies chest pain, pressure, palpitations, Denies lower extremity edema. GASTROINTESTINAL: Denies abdominal pain, denies constipation, denies diarrhea, bleeding in the stools,   GENITOURINARY: Denies dysuria, hematuria, reports dysuria, and foul odor, denies urinary incontinence. NEUROLOGIC: Denies headaches, dizziness, syncope, weakness  MUSCULOSKELETAL: denies changes in range of motion, joint pain, stiffness. ENDOCRINOLOGY: Denies heat or cold intolerance. HEMATOLOGY: Denies easy bleeding or blood transfusion, reports anemia  DERMATOLOGY: Denies changes in moles or pigmentation changes. PSYCHIATRY: Denies depression, agitation, reports anxiety. Past Medical History:   Diagnosis Date    Allergic rhinitis     Anxiety disorder     Asthma, moderate persistent, poorly-controlled     intubated x 2013    Cleft lip     Divergent strabismus     Diverticulitis 2013    s/p partial colectomy, Dr Modesto Lundborg Hyperlipidemia LDL goal <100     Hypertension     Obesity (BMI 30-39. 9)     Type 2 diabetes mellitus ([facility] [address] 2020    A1C 6.8        Past Surgical History:   Procedure Laterality Date     SECTION      x 3    CLEFT LIP REPAIR      OTHER SURGICAL HISTORY  13    Exploratory laparotomy with sigmoid colectomy, drainage pelvic abscess and formation end descending colostomy    OTHER SURGICAL HISTORY  3/20/14    Exploratory lapartomy with extensived lysis of adhesions, takedown of colostmy with primary stapled anastomosis, rigid sigmoidoscopy        Family History   Problem Relation Age of Onset    Heart Failure Mother         dec age   [facility] Diabetes Mother     Cancer Sister         pancreas, dec age 40        Social History     Socioeconomic History  Marital status:      Spouse name: Not on file    Number of children: 1    Years of education: Not on file    Highest education level: Not on file   Occupational History    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[employer]    Social Needs    Financial resource strain: Not on file    Food insecurity     Worry: Not on file     Inability: Not on file   [employer] needs     Medical: Not on file     Non-medical: Not on file   Tobacco Use    Smoking status: Never Smoker    Smokeless tobacco: Never Used   Substance and Sexual Activity    Alcohol use: No     Alcohol/week: 0.0 standard drinks     Comment: social    Drug use: No    Sexual activity: Not on file   Lifestyle    Physical activity     Days per week: Not on file     Minutes per session: Not on file    Stress: Not on file   Relationships    Social connections     Talks on phone: Not on file     Gets together: Not on file     Attends Caodaism service: Not on file     Active member of club or organization: Not on file     Attends meetings of clubs or organizations: Not on file     Relationship status: Not on file    Intimate partner violence     Fear of current or ex partner: Not on file     Emotionally abused: Not on file     Physically abused: Not on file     Forced sexual activity: Not on file   Other Topics Concern    Not on file   Social History Narrative    Born in [place] one of 3     Dec 30, 2015 to a [place]    3 children on [facility]    Works in [employer] [address] 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[place] 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 and grandson    Hobbies cooking, family, bowling        BP (!) 138/90   Pulse 120   Temp 98.2 °F (36.8 °C) (Oral)   Ht 5' 5\" (1.651 m)   Wt 205 lb 9.6 oz (93.3 kg)   SpO2 96%   BMI 34.21 kg/m²        Physical Exam:    General appearance - alert, well appearing, and in no distress  Mental Status - alert, oriented to person, place, and time  Eyes - pupils equal and reactive, extraocular eye movements intact   Ears - bilateral TM's and external ear canals normal   Nose - normal and patent, no erythema, discharge or polyps   Sinuses - Normal paranasal sinuses without tenderness   Throat - mucous membranes moist, pharynx normal without lesions   Neck - supple, no significant adenopathy   Thyroid - thyroid is normal in size without nodules or tenderness    Chest -coarse breath sounds with expiratory wheezing bilaterally. No signs of focal consolidation. Heart - normal rate, regular rhythm, normal S1, S2, no murmurs, rubs, clicks or gallops  Abdomen - soft, nontender, nondistended, no masses or organomegaly   Back exam - full range of motion, no tenderness, palpable spasm or pain on motion   Neurological - alert, oriented, normal speech, no focal findings or movement disorder noted   Musculoskeletal - no joint tenderness, deformity or swelling   Extremities - peripheral pulses normal, no pedal edema, no clubbing or cyanosis   Skin - normal coloration and turgor, no rashes, no suspicious skin lesions noted    Labs   No results found for: TSHREFLEX  TSH   Date Value Ref Range Status   09/18/2014 1.240 0.270 - 4.200 uIU/mL Final   06/18/2014 2.000 0.270 - 4.200 uIU/mL Final   05/14/2014 3.270 uIU/mL Final   12/27/2013 0.509 0.270 - 4.200 uIU/mL Final     Comment:     Effective 12/4/2013  Methodology and/or Reference Range has changed.    12/01/2013 1.511 0.550 - 4.780 uIU/mL Final   03/30/2013 1.634 0.550 - 4.780 uIU/mL Final   02/07/2013 2.275 0.550 - 4.780 uIU/mL Final   01/14/2013 0.627 0.550 - 4.780 uIU/mL Final   04/03/2012 2.059 0.550 - 4.780 uIU/mL Final     Lab Results   Component Value Date     07/22/2020    K 3.8 07/22/2020     07/22/2020    CO2 26 07/22/2020    BUN 8 07/22/2020    CREATININE 0.76 07/22/2020    GLUCOSE 142 (H) 07/22/2020    CALCIUM 9.2 07/22/2020    PROT 6.7 07/22/2020    LABALBU 4.1 07/22/2020    BILITOT 0.6 07/22/2020    ALKPHOS 70 07/22/2020    AST 22 07/22/2020    ALT 17 07/22/2020    LABGLOM >60.0 07/22/2020    GFRAA >60.0 07/22/2020    GLOB 2.6 07/22/2020       Lab Results   Component Value Date    LABA1C 6.8 07/24/2020     No results found for: EAG  Lab Results   Component Value Date    TSH 1.240 09/18/2014           A/P: Mary sarah 47 y.o. female presenting for       1. Uncontrolled persistent asthma    - predniSONE (DELTASONE) 20 MG tablet; Take 2 tablets by mouth daily for 5 days  Dispense: 10 tablet; Refill: Melita Tan MD, Pulmonology, [facility]    2. Bronchitis    - sulfamethoxazole-trimethoprim (BACTRIM DS;SEPTRA DS) 800-160 MG per tablet; Take 1 tablet by mouth 2 times daily for 10 days  Dispense: 20 tablet; Refill: 0    3. Obesity (BMI 30-39.9)    -  DIABETES FOOT EXAM    4. Screening for colon cancer      5. IFG (impaired fasting glucose)    - Microalbumin / Creatinine Urine Ratio;  Future  - POCT glycosylated hemoglobin (Hb A1C)

## 2020-11-13 ENCOUNTER — TELEPHONE (OUTPATIENT)
Dept: FAMILY MEDICINE CLINIC | Age: 55
End: 2020-11-13

## 2020-11-13 NOTE — TELEPHONE ENCOUNTER
Patient called to let you know that her covid test was negative. Since her appt on 11/11, she has been coughing, throwing up and has diarrhea. She said it feels like she has the flu. She just asked that I get the message to you.

## 2020-11-18 NOTE — TELEPHONE ENCOUNTER
LMOVM for pt to sign into my chart & read message. Told her if she was unable to get into her my chart to give the office a call back so message could be relayed to her.

## 2020-11-23 ENCOUNTER — TELEPHONE (OUTPATIENT)
Dept: FAMILY MEDICINE CLINIC | Age: 55
End: 2020-11-23

## 2020-11-23 RX ORDER — ONDANSETRON 4 MG/1
4 TABLET, FILM COATED ORAL EVERY 12 HOURS PRN
Qty: 30 TABLET | Refills: 3 | Status: SHIPPED | OUTPATIENT
Start: 2020-11-23 | End: 2021-02-10 | Stop reason: SDUPTHER

## 2020-11-24 ENCOUNTER — NURSE ONLY (OUTPATIENT)
Dept: FAMILY MEDICINE CLINIC | Age: 55
End: 2020-11-24

## 2020-11-24 ENCOUNTER — VIRTUAL VISIT (OUTPATIENT)
Dept: FAMILY MEDICINE CLINIC | Age: 55
End: 2020-11-24
Payer: COMMERCIAL

## 2020-11-24 DIAGNOSIS — Z20.822 COVID-19 RULED OUT: ICD-10-CM

## 2020-11-24 PROCEDURE — 99442 PR PHYS/QHP TELEPHONE EVALUATION 11-20 MIN: CPT | Performed by: PHYSICIAN ASSISTANT

## 2020-11-24 RX ORDER — METHYLPREDNISOLONE 4 MG/1
TABLET ORAL
Qty: 1 KIT | Refills: 0 | Status: SHIPPED | OUTPATIENT
Start: 2020-11-24 | End: 2020-11-30

## 2020-11-24 RX ORDER — DOXYCYCLINE HYCLATE 100 MG
100 TABLET ORAL 2 TIMES DAILY
Qty: 20 TABLET | Refills: 0 | Status: SHIPPED | OUTPATIENT
Start: 2020-11-24 | End: 2020-12-04

## 2020-11-24 NOTE — PROGRESS NOTES
2020     Lisa sarah (:  1965) is a 47 y.o. female, here for evaluation of the following medical concerns:    HPI  Ramakrishna Sainz video visit due to concern for exposure to COVID-19 (coronavirus). Patient is aware this is a billable visit. Patient was identified to start interview and was in her home I was remote. Patient with complaint of cough shortness of breath chest congestion fever of 100.4 today symptoms have been present for the past several days also has diarrhea several bouts per day and nausea. Patient has been on quarantine for the past 2 weeks due to concern for exposure on her job questioning next steps    Review of Systems    Prior to Visit Medications    Medication Sig Taking?  Authorizing Provider   ondansetron (ZOFRAN) 4 MG tablet Take 1 tablet by mouth every 12 hours as needed for Nausea or Vomiting  Toshia Galvan MD   vitamin B-12 (CYANOCOBALAMIN) 100 MCG tablet Take 1 tablet by mouth daily  Toshia Galvan MD   benzonatate (TESSALON) 100 MG capsule TAKE ONE TO TWO CAPSULES BY MOUTH THREE TIMES A DAY AS NEEDED FOR COUGH  Toshia Galvan MD   fluticasone-salmeterol (ADVAIR DISKUS) 500-50 MCG/DOSE diskus inhaler Inhale 1 puff into the lungs every 12 hours  Artie Copeland MD   folic acid (V-R FOLIC ACID) 759 MCG tablet Take 1 tablet by mouth daily  Artie Copeland MD   albuterol (PROVENTIL) (2.5 MG/3ML) 0.083% nebulizer solution USE 1 VIAL VIA NEBULIZER EVERY 6 HOURS AS NEEDED  Toshia Galvan MD   albuterol sulfate  (90 Base) MCG/ACT inhaler Inhale 2 puffs into the lungs 4 times daily as needed for Wheezing  Toshia Galvan MD   pravastatin (PRAVACHOL) 20 MG tablet Take 1 tablet by mouth every evening  Artie Copeland MD   amLODIPine (NORVASC) 5 MG tablet Take 1 tablet by mouth daily  Artie Copeland MD   loratadine (CLARITIN) 10 MG tablet Take 1 tablet by mouth daily  Artie Copeland MD   montelukast (SINGULAIR) 10 MG tablet TAKE 1 TABLET BY MOUTH EVERY NIGHT AT BEDTIME  Tran Onofre MD   famotidine (PEPCID) 40 MG tablet Take 0.5 tablets by mouth 2 times daily  Toshia Galvan MD   ferrous sulfate (FE TABS) 325 (65 Fe) MG EC tablet Take 1 tablet by mouth every morning (before breakfast)  Tran Onofre MD   budesonide (PULMICORT) 0.25 MG/2ML nebulizer suspension USE 1 VIAL VIA NEBULIZER TWICE DAILY  Toshia Galvan MD   Respiratory Therapy Supplies (NEBULIZER/TUBING/MOUTHPIECE) KIT 1 kit by Does not apply route daily as needed (use as directed)  Tran Onofre MD   fluticasone (FLONASE) 50 MCG/ACT nasal spray 1 spray by Nasal route daily  Tran Onofre MD   cetirizine (ZYRTEC) 10 MG tablet TK 1 T PO QD  Historical Provider, MD   Spacer/Aero-Holding Chambers (E-Z SPACER) GASTON 1 Device by Does not apply route daily as needed  Rodrick Armstrong MD        Social History     Tobacco Use    Smoking status: Never Smoker    Smokeless tobacco: Never Used   Substance Use Topics    Alcohol use: No     Alcohol/week: 0.0 standard drinks     Comment: social        There were no vitals filed for this visit. Estimated body mass index is 34.21 kg/m² as calculated from the following:    Height as of 11/11/20: 5' 5\" (1.651 m). Weight as of 11/11/20: 205 lb 9.6 oz (93.3 kg). Physical Exam  Constitutional:       General: She is not in acute distress. Appearance: She is obese. She is not ill-appearing. HENT:      Head: Normocephalic and atraumatic. Right Ear: External ear normal.      Left Ear: External ear normal.      Nose: Nose normal.      Mouth/Throat:      Mouth: Mucous membranes are moist.      Pharynx: Oropharynx is clear. Eyes:      Conjunctiva/sclera: Conjunctivae normal.      Pupils: Pupils are equal, round, and reactive to light. Neck:      Musculoskeletal: Normal range of motion and neck supple. Thyroid: No thyromegaly.    Cardiovascular:      Rate and Rhythm: Normal rate and regular rhythm. Heart sounds: Normal heart sounds. No murmur. Pulmonary:      Effort: Pulmonary effort is normal. No respiratory distress. Breath sounds: Normal breath sounds. No wheezing. Abdominal:      General: Bowel sounds are normal.      Palpations: Abdomen is soft. There is no mass. Tenderness: There is no abdominal tenderness. There is no guarding. Musculoskeletal: Normal range of motion. Lymphadenopathy:      Cervical: No cervical adenopathy. Skin:     General: Skin is warm and dry. Coloration: Skin is not jaundiced. Neurological:      General: No focal deficit present. Mental Status: She is alert and oriented to person, place, and time. Cranial Nerves: No cranial nerve deficit. Motor: No weakness. Coordination: Coordination normal.      Gait: Gait normal.   Psychiatric:         Mood and Affect: Mood normal.         Behavior: Behavior normal.         Thought Content: Thought content normal.         Judgment: Judgment normal.              Assessment & Plan    Diagnosis Orders   1. COVID-19 ruled out  COVID-19 Ambulatory   2. SOB (shortness of breath)           Orders Placed This Encounter   Procedures    COVID-19 Ambulatory     Standing Status:   Future     Standing Expiration Date:   11/24/2021     Scheduling Instructions:      Saline media preferred given current shortage of viral transport media but both acceptable     Order Specific Question:   Is this test for diagnosis or screening? Answer:   Diagnosis of ill patient     Order Specific Question:   Symptomatic for COVID-19 as defined by CDC? Answer:   Yes     Order Specific Question:   Date of Symptom Onset     Answer:   11/20/2020     Order Specific Question:   Hospitalized for COVID-19? Answer:   No     Order Specific Question:   Admitted to ICU for COVID-19? Answer:   No     Order Specific Question:   Employed in healthcare setting?      Answer:   Yes     Order Specific Question: Resident in a congregate (group) care setting? Answer:   No     Order Specific Question:   Pregnant? Answer:   No     Order Specific Question:   Previously tested for COVID-19? Answer:   Yes     Orders Placed This Encounter   Medications    doxycycline hyclate (VIBRA-TABS) 100 MG tablet     Sig: Take 1 tablet by mouth 2 times daily for 10 days     Dispense:  20 tablet     Refill:  0    methylPREDNISolone (MEDROL DOSEPACK) 4 MG tablet     Sig: Take by mouth. Dispense:  1 kit     Refill:  0     An electronic signature was used to authenticate this note.   20 min visit  --Deborah Mccarty PA-C on 11/24/2020 at 2:49 PM

## 2020-11-26 LAB
SARS-COV-2: NOT DETECTED
SOURCE: NORMAL

## 2020-11-27 ENCOUNTER — TELEPHONE (OUTPATIENT)
Dept: FAMILY MEDICINE CLINIC | Age: 55
End: 2020-11-27

## 2020-11-30 ENCOUNTER — TELEPHONE (OUTPATIENT)
Dept: FAMILY MEDICINE CLINIC | Age: 55
End: 2020-11-30

## 2020-12-01 ENCOUNTER — TELEPHONE (OUTPATIENT)
Dept: FAMILY MEDICINE CLINIC | Age: 55
End: 2020-12-01

## 2020-12-01 NOTE — TELEPHONE ENCOUNTER
Ria Blankenship called office requesting the RTW letter be faxed to the attn of Vivian Beard.   At fax # [phone]

## 2020-12-15 RX ORDER — PREDNISONE 20 MG/1
40 TABLET ORAL DAILY
Qty: 10 TABLET | Refills: 0 | Status: SHIPPED | OUTPATIENT
Start: 2020-12-15 | End: 2021-01-20 | Stop reason: SDUPTHER

## 2020-12-15 RX ORDER — ALPRAZOLAM 0.5 MG/1
0.5 TABLET ORAL DAILY PRN
Qty: 30 TABLET | Refills: 2 | Status: SHIPPED | OUTPATIENT
Start: 2020-12-15 | End: 2021-05-18

## 2020-12-22 ENCOUNTER — TELEPHONE (OUTPATIENT)
Dept: FAMILY MEDICINE CLINIC | Age: 55
End: 2020-12-22

## 2021-01-05 NOTE — TELEPHONE ENCOUNTER
[facility] w/ [employer] called office stating she has 4 questions to process.     She can be reached at a direct line  [phone]

## 2021-01-05 NOTE — TELEPHONE ENCOUNTER
Please let patient know I spoke to Romulo Riley from short term disability. She says she has all the paperwork she needs. No additional items is needed from the provider at this time.

## 2021-01-14 ENCOUNTER — OFFICE VISIT (OUTPATIENT)
Dept: PULMONOLOGY | Age: 56
End: 2021-01-14
Payer: COMMERCIAL

## 2021-01-14 VITALS
HEIGHT: 65 IN | BODY MASS INDEX: 33.32 KG/M2 | RESPIRATION RATE: 16 BRPM | DIASTOLIC BLOOD PRESSURE: 84 MMHG | HEART RATE: 98 BPM | TEMPERATURE: 96.8 F | OXYGEN SATURATION: 99 % | WEIGHT: 200 LBS | SYSTOLIC BLOOD PRESSURE: 132 MMHG

## 2021-01-14 DIAGNOSIS — G47.33 OSA (OBSTRUCTIVE SLEEP APNEA): ICD-10-CM

## 2021-01-14 DIAGNOSIS — J45.50 SEVERE PERSISTENT ASTHMA WITHOUT COMPLICATION: ICD-10-CM

## 2021-01-14 DIAGNOSIS — K21.9 GASTROESOPHAGEAL REFLUX DISEASE WITHOUT ESOPHAGITIS: ICD-10-CM

## 2021-01-14 DIAGNOSIS — E66.09 CLASS 1 OBESITY DUE TO EXCESS CALORIES WITHOUT SERIOUS COMORBIDITY WITH BODY MASS INDEX (BMI) OF 33.0 TO 33.9 IN ADULT: ICD-10-CM

## 2021-01-14 DIAGNOSIS — J30.9 ALLERGIC RHINITIS, UNSPECIFIED SEASONALITY, UNSPECIFIED TRIGGER: ICD-10-CM

## 2021-01-14 DIAGNOSIS — R06.02 SOB (SHORTNESS OF BREATH): Primary | ICD-10-CM

## 2021-01-14 PROCEDURE — 99204 OFFICE O/P NEW MOD 45 MIN: CPT | Performed by: INTERNAL MEDICINE

## 2021-01-14 RX ORDER — TIOTROPIUM BROMIDE INHALATION SPRAY 1.56 UG/1
2 SPRAY, METERED RESPIRATORY (INHALATION) DAILY
Qty: 1 INHALER | Refills: 3 | Status: SHIPPED
Start: 2021-01-14 | End: 2021-08-19 | Stop reason: ALTCHOICE

## 2021-01-14 NOTE — PROGRESS NOTES
Subjective:     Mignon sarah is a 54 y.o. female who complains today of:     Chief Complaint   Patient presents with    New Patient     Asthma per Dr. Naif Mack       HPI  Patient presents for patient presents for evaluation of asthma  She had asthma since young age, she is currently on Advair and Singulair, she has been experiencing worsening symptoms over the last few months, with frequent use of rescue inhalers multiple times during the day, frequent symptoms while asleep of coughing and wheezing, she also reports snoring, and occasional choking while asleep, no fever or chills, she does have nasal congestion she uses Flonase occasionally, no heartburn and she is on Pepcid, no lower extremity edema, she report no seasonal or certain environmental trigger of her asthma symptoms. She never smoked, her parents were heavy smokers. No family history of asthma otherwise. 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 No skin rash, no joint swelling or stiffness          Allergies:  Bacitracin, Butalbital-aspirin-caffeine, Codeine, Floxin [ofloxacin], Rocephin [ceftriaxone sodium], Tomato, Asa [aspirin], Lisinopril, and Azithromycin  Past Medical History:   Diagnosis Date    Allergic rhinitis     Anxiety disorder     Asthma, moderate persistent, poorly-controlled     intubated x 2013    Cleft lip     Divergent strabismus     Diverticulitis 2013    s/p partial colectomy, Dr Anne-Marie Gardiner Hyperlipidemia LDL goal <100     Hypertension     Obesity (BMI 30-39. 9)     Type 2 diabetes mellitus ([facility] [address] 2020    A1C 6.8     Past Surgical History:   Procedure Laterality Date     SECTION      x 3    CLEFT LIP REPAIR      OTHER SURGICAL HISTORY  13    Exploratory laparotomy with sigmoid colectomy, drainage pelvic abscess and formation end descending colostomy    OTHER SURGICAL HISTORY  3/20/14 Exploratory lapartomy with extensived lysis of adhesions, takedown of colostmy with primary stapled anastomosis, rigid sigmoidoscopy     Family History   Problem Relation Age of Onset    Heart Failure Mother         dec age   Marce Salines Diabetes Mother     Cancer Sister         pancreas, dec age 40     Social History     Socioeconomic History    Marital status:      Spouse name: Not on file    Number of children: 1    Years of education: Not on file    Highest education level: Not on file   Occupational History    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[employer]    Social Needs    Financial resource strain: Not on file    Food insecurity     Worry: Not on file     Inability: Not on file   [employer] needs     Medical: Not on file     Non-medical: Not on file   Tobacco Use    Smoking status: Never Smoker    Smokeless tobacco: Never Used   Substance and Sexual Activity    Alcohol use: No     Alcohol/week: 0.0 standard drinks     Comment: social    Drug use: No    Sexual activity: Not on file   Lifestyle    Physical activity     Days per week: Not on file     Minutes per session: Not on file    Stress: Not on file   Relationships    Social connections     Talks on phone: Not on file     Gets together: Not on file     Attends Congregational service: Not on file     Active member of club or organization: Not on file     Attends meetings of clubs or organizations: Not on file     Relationship status: Not on file    Intimate partner violence     Fear of current or ex partner: Not on file     Emotionally abused: Not on file     Physically abused: Not on file     Forced sexual activity: Not on file   Other Topics Concern    Not on file   Social History Narrative    Born in [facility], one of 3     Dec 30, 2015 to a [place]    3 children on [facility]    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 Current Outpatient Medications   Medication Sig Dispense Refill    tiotropium (SPIRIVA RESPIMAT) 1.25 MCG/ACT AERS inhaler Inhale 2 puffs into the lungs daily 1 Inhaler 3    ALPRAZolam (XANAX) 0.5 MG tablet Take 1 tablet by mouth daily as needed for Anxiety for up to 30 days.  30 tablet 2    ondansetron (ZOFRAN) 4 MG tablet Take 1 tablet by mouth every 12 hours as needed for Nausea or Vomiting 30 tablet 3    vitamin B-12 (CYANOCOBALAMIN) 100 MCG tablet Take 1 tablet by mouth daily 30 tablet 2    benzonatate (TESSALON) 100 MG capsule TAKE ONE TO TWO CAPSULES BY MOUTH THREE TIMES A DAY AS NEEDED FOR COUGH 60 capsule 5    fluticasone-salmeterol (ADVAIR DISKUS) 500-50 MCG/DOSE diskus inhaler Inhale 1 puff into the lungs every 12 hours 60 each 3    folic acid (V-R FOLIC ACID) 575 MCG tablet Take 1 tablet by mouth daily 30 tablet 2    albuterol (PROVENTIL) (2.5 MG/3ML) 0.083% nebulizer solution USE 1 VIAL VIA NEBULIZER EVERY 6 HOURS AS NEEDED 450 mL 2    albuterol sulfate  (90 Base) MCG/ACT inhaler Inhale 2 puffs into the lungs 4 times daily as needed for Wheezing 1 Inhaler 2    pravastatin (PRAVACHOL) 20 MG tablet Take 1 tablet by mouth every evening 30 tablet 2    amLODIPine (NORVASC) 5 MG tablet Take 1 tablet by mouth daily 30 tablet 2    loratadine (CLARITIN) 10 MG tablet Take 1 tablet by mouth daily 30 tablet 2    montelukast (SINGULAIR) 10 MG tablet TAKE 1 TABLET BY MOUTH EVERY NIGHT AT BEDTIME 30 tablet 2    famotidine (PEPCID) 40 MG tablet Take 0.5 tablets by mouth 2 times daily 30 tablet 2    ferrous sulfate (FE TABS) 325 (65 Fe) MG EC tablet Take 1 tablet by mouth every morning (before breakfast) 30 tablet 2  budesonide (PULMICORT) 0.25 MG/2ML nebulizer suspension USE 1 VIAL VIA NEBULIZER TWICE DAILY 120 mL 2    Respiratory Therapy Supplies (NEBULIZER/TUBING/MOUTHPIECE) KIT 1 kit by Does not apply route daily as needed (use as directed) 1 kit 0    fluticasone (FLONASE) 50 MCG/ACT nasal spray 1 spray by Nasal route daily 2 Bottle 1    cetirizine (ZYRTEC) 10 MG tablet TK 1 T PO QD  0    Spacer/Aero-Holding Chambers (E-Z SPACER) GASTON 1 Device by Does not apply route daily as needed 1 Device 0     No current facility-administered medications for this visit. Objective:     Vitals:    01/14/21 1245   BP: 132/84   Site: Left Upper Arm   Position: Sitting   Cuff Size: Medium Adult   Pulse: 98   Resp: 16   Temp: 96.8 °F (36 °C)   TempSrc: Tympanic   SpO2: 99%   Weight: 200 lb (90.7 kg)   Height: 5' 5\" (1.651 m)         Physical Exam  Constitutional:       General: She is not in acute distress. Appearance: She is well-developed. She is not diaphoretic. HENT:      Head: Normocephalic and atraumatic. Eyes:      Conjunctiva/sclera: Conjunctivae normal.      Pupils: Pupils are equal, round, and reactive to light. Neck:      Musculoskeletal: Normal range of motion and neck supple. Cardiovascular:      Rate and Rhythm: Normal rate and regular rhythm. Heart sounds: No murmur. No friction rub. No gallop. Pulmonary:      Effort: Pulmonary effort is normal. No respiratory distress. Breath sounds: Normal breath sounds. No wheezing or rales. Chest:      Chest wall: No tenderness. Abdominal:      General: There is no distension. Palpations: Abdomen is soft. Tenderness: There is no abdominal tenderness. There is no rebound. Musculoskeletal:         General: No tenderness. Right lower leg: No edema. Left lower leg: No edema. Lymphadenopathy:      Cervical: No cervical adenopathy. Skin:     General: Skin is warm and dry. Findings: No erythema.    Neurological: Standing Status:   Future     Standing Expiration Date:   1/14/2022    Full PFT Study With Bronchodilator     Standing Status:   Future     Standing Expiration Date:   2/13/2021     Orders Placed This Encounter   Medications    tiotropium (SPIRIVA RESPIMAT) 1.25 MCG/ACT AERS inhaler     Sig: Inhale 2 puffs into the lungs daily     Dispense:  1 Inhaler     Refill:  3            Discussed with patient the importance of exercise and weight control and  overall health and well-being. Reviewed with the patient: current clinical status, medications, activities and diet. Side effects, adverse effects of the medication prescribed today, as well as treatment plan and result expectations have been discussed with the patient who expresses understanding and desires to proceed. Return in about 2 months (around 3/14/2021).       Kaelyn Garner MD

## 2021-01-19 DIAGNOSIS — J45.998 UNCONTROLLED PERSISTENT ASTHMA: ICD-10-CM

## 2021-01-19 RX ORDER — PREDNISONE 20 MG/1
TABLET ORAL
Qty: 10 TABLET | Refills: 0 | OUTPATIENT
Start: 2021-01-19

## 2021-01-19 NOTE — TELEPHONE ENCOUNTER
Keyonna Rodriguez is requesting medication refill. Patient has confirmed the pharmacy.     Rx requested:  Requested Prescriptions     Pending Prescriptions Disp Refills    predniSONE (DELTASONE) 20 MG tablet 10 tablet 0     Sig: Take 2 tablets by mouth daily for 5 days       Last Office Visit:   11/11/2020    Next Visit Date:  Future Appointments   Date Time Provider Gela Pearl   3/22/2021  2:45 PM Bianca Baez MD [address] [place] [address]

## 2021-01-20 RX ORDER — PREDNISONE 20 MG/1
40 TABLET ORAL DAILY
Qty: 10 TABLET | Refills: 0 | Status: SHIPPED | OUTPATIENT
Start: 2021-01-20 | End: 2021-03-15

## 2021-02-08 ENCOUNTER — HOSPITAL ENCOUNTER (OUTPATIENT)
Dept: GENERAL RADIOLOGY | Age: 56
Discharge: HOME | End: 2021-02-10
Payer: COMMERCIAL

## 2021-02-08 ENCOUNTER — HOSPITAL ENCOUNTER (OUTPATIENT)
Dept: PULMONOLOGY | Age: 56
Discharge: HOME | End: 2021-02-08
Payer: COMMERCIAL

## 2021-02-08 DIAGNOSIS — J45.50 SEVERE PERSISTENT ASTHMA WITHOUT COMPLICATION: ICD-10-CM

## 2021-02-08 DIAGNOSIS — R06.02 SOB (SHORTNESS OF BREATH): ICD-10-CM

## 2021-02-08 PROCEDURE — 6360000002 HC RX W HCPCS: Performed by: INTERNAL MEDICINE

## 2021-02-08 PROCEDURE — 94729 DIFFUSING CAPACITY: CPT | Performed by: INTERNAL MEDICINE

## 2021-02-08 PROCEDURE — 71046 X-RAY EXAM CHEST 2 VIEWS: CPT

## 2021-02-08 PROCEDURE — 94726 PLETHYSMOGRAPHY LUNG VOLUMES: CPT

## 2021-02-08 PROCEDURE — 94729 DIFFUSING CAPACITY: CPT

## 2021-02-08 PROCEDURE — 94060 EVALUATION OF WHEEZING: CPT

## 2021-02-08 PROCEDURE — 94726 PLETHYSMOGRAPHY LUNG VOLUMES: CPT | Performed by: INTERNAL MEDICINE

## 2021-02-08 PROCEDURE — 94060 EVALUATION OF WHEEZING: CPT | Performed by: INTERNAL MEDICINE

## 2021-02-08 RX ORDER — ALBUTEROL SULFATE 2.5 MG/3ML
2.5 SOLUTION RESPIRATORY (INHALATION) ONCE
Status: COMPLETED | OUTPATIENT
Start: 2021-02-08 | End: 2021-02-08

## 2021-02-08 RX ADMIN — ALBUTEROL SULFATE 2.5 MG: 2.5 SOLUTION RESPIRATORY (INHALATION) at 08:38

## 2021-02-10 ENCOUNTER — OFFICE VISIT (OUTPATIENT)
Dept: FAMILY MEDICINE CLINIC | Age: 56
End: 2021-02-10
Payer: COMMERCIAL

## 2021-02-10 VITALS
BODY MASS INDEX: 33.92 KG/M2 | SYSTOLIC BLOOD PRESSURE: 120 MMHG | OXYGEN SATURATION: 96 % | HEART RATE: 106 BPM | WEIGHT: 203.6 LBS | DIASTOLIC BLOOD PRESSURE: 88 MMHG | HEIGHT: 65 IN | TEMPERATURE: 98.5 F

## 2021-02-10 DIAGNOSIS — Z01.00 EYE EXAM, ROUTINE: ICD-10-CM

## 2021-02-10 DIAGNOSIS — E11.9 TYPE 2 DIABETES MELLITUS WITHOUT COMPLICATION, WITHOUT LONG-TERM CURRENT USE OF INSULIN (HCC): ICD-10-CM

## 2021-02-10 DIAGNOSIS — J45.50 SEVERE PERSISTENT ASTHMA WITHOUT COMPLICATION: ICD-10-CM

## 2021-02-10 DIAGNOSIS — D64.9 ANEMIA, UNSPECIFIED TYPE: ICD-10-CM

## 2021-02-10 DIAGNOSIS — I10 HYPERTENSION, UNSPECIFIED TYPE: ICD-10-CM

## 2021-02-10 DIAGNOSIS — E78.5 HYPERLIPIDEMIA LDL GOAL <100: ICD-10-CM

## 2021-02-10 DIAGNOSIS — K21.9 GASTROESOPHAGEAL REFLUX DISEASE WITHOUT ESOPHAGITIS: ICD-10-CM

## 2021-02-10 DIAGNOSIS — J30.2 SEASONAL ALLERGIES: ICD-10-CM

## 2021-02-10 DIAGNOSIS — E78.5 HYPERLIPIDEMIA, UNSPECIFIED HYPERLIPIDEMIA TYPE: ICD-10-CM

## 2021-02-10 DIAGNOSIS — H50.10: ICD-10-CM

## 2021-02-10 DIAGNOSIS — Z13.220 ENCOUNTER FOR SCREENING FOR LIPID DISORDER: ICD-10-CM

## 2021-02-10 DIAGNOSIS — Z12.31 SCREENING MAMMOGRAM, ENCOUNTER FOR: ICD-10-CM

## 2021-02-10 DIAGNOSIS — J45.50 SEVERE PERSISTENT ASTHMA, UNSPECIFIED WHETHER COMPLICATED: Primary | ICD-10-CM

## 2021-02-10 DIAGNOSIS — R11.0 NAUSEA: ICD-10-CM

## 2021-02-10 DIAGNOSIS — I10 ESSENTIAL HYPERTENSION: ICD-10-CM

## 2021-02-10 DIAGNOSIS — Z12.11 SCREENING FOR COLON CANCER: ICD-10-CM

## 2021-02-10 LAB
ALBUMIN SERPL-MCNC: 4.2 G/DL (ref 3.5–4.6)
ALP BLD-CCNC: 83 U/L (ref 40–130)
ALT SERPL-CCNC: 9 U/L (ref 0–33)
ANION GAP SERPL CALCULATED.3IONS-SCNC: 12 MEQ/L (ref 9–15)
AST SERPL-CCNC: 15 U/L (ref 0–35)
BILIRUB SERPL-MCNC: 1.2 MG/DL (ref 0.2–0.7)
BUN BLDV-MCNC: 11 MG/DL (ref 6–20)
CALCIUM SERPL-MCNC: 9.6 MG/DL (ref 8.5–9.9)
CHLORIDE BLD-SCNC: 99 MEQ/L (ref 95–107)
CHOLESTEROL, FASTING: 250 MG/DL (ref 0–199)
CO2: 26 MEQ/L (ref 20–31)
CREAT SERPL-MCNC: 0.88 MG/DL (ref 0.5–0.9)
GFR AFRICAN AMERICAN: >60
GFR NON-AFRICAN AMERICAN: >60
GLOBULIN: 3.3 G/DL (ref 2.3–3.5)
GLUCOSE BLD-MCNC: 110 MG/DL (ref 70–99)
HBA1C MFR BLD: 6.6 % (ref 4.8–5.9)
HDLC SERPL-MCNC: 64 MG/DL (ref 40–59)
LDL CHOLESTEROL CALCULATED: 166 MG/DL (ref 0–129)
POTASSIUM SERPL-SCNC: 3.7 MEQ/L (ref 3.4–4.9)
SODIUM BLD-SCNC: 137 MEQ/L (ref 135–144)
TOTAL PROTEIN: 7.5 G/DL (ref 6.3–8)
TRIGLYCERIDE, FASTING: 98 MG/DL (ref 0–150)

## 2021-02-10 PROCEDURE — 99214 OFFICE O/P EST MOD 30 MIN: CPT | Performed by: FAMILY MEDICINE

## 2021-02-10 RX ORDER — LORATADINE 10 MG/1
10 TABLET ORAL DAILY
Qty: 30 TABLET | Refills: 3 | Status: SHIPPED | OUTPATIENT
Start: 2021-02-10

## 2021-02-10 RX ORDER — PRAVASTATIN SODIUM 20 MG
20 TABLET ORAL EVERY EVENING
Qty: 30 TABLET | Refills: 3 | Status: SHIPPED
Start: 2021-02-10 | End: 2021-02-11 | Stop reason: DRUGHIGH

## 2021-02-10 RX ORDER — NEBULIZER ACCESSORIES
1 KIT MISCELLANEOUS DAILY PRN
Qty: 1 KIT | Refills: 0 | Status: SHIPPED | OUTPATIENT
Start: 2021-02-10

## 2021-02-10 RX ORDER — UBIDECARENONE 75 MG
100 CAPSULE ORAL DAILY
Qty: 30 TABLET | Refills: 3 | Status: SHIPPED | OUTPATIENT
Start: 2021-02-10 | End: 2022-02-08

## 2021-02-10 RX ORDER — LANOLIN ALCOHOL/MO/W.PET/CERES
400 CREAM (GRAM) TOPICAL DAILY
Qty: 30 TABLET | Refills: 3 | Status: SHIPPED | OUTPATIENT
Start: 2021-02-10 | End: 2021-06-30

## 2021-02-10 RX ORDER — ONDANSETRON 4 MG/1
4 TABLET, FILM COATED ORAL EVERY 12 HOURS PRN
Qty: 30 TABLET | Refills: 3 | Status: SHIPPED | OUTPATIENT
Start: 2021-02-10 | End: 2022-06-24 | Stop reason: SDUPTHER

## 2021-02-10 RX ORDER — LANOLIN ALCOHOL/MO/W.PET/CERES
325 CREAM (GRAM) TOPICAL
Qty: 30 TABLET | Refills: 3 | Status: SHIPPED | OUTPATIENT
Start: 2021-02-10

## 2021-02-10 RX ORDER — MONTELUKAST SODIUM 10 MG/1
TABLET ORAL
Qty: 30 TABLET | Refills: 3 | Status: SHIPPED | OUTPATIENT
Start: 2021-02-10 | End: 2022-06-20 | Stop reason: SDUPTHER

## 2021-02-10 RX ORDER — BENZONATATE 100 MG/1
CAPSULE ORAL
Qty: 60 CAPSULE | Refills: 5 | Status: SHIPPED | OUTPATIENT
Start: 2021-02-10 | End: 2022-08-30

## 2021-02-10 RX ORDER — ALBUTEROL SULFATE 90 UG/1
2 AEROSOL, METERED RESPIRATORY (INHALATION) 4 TIMES DAILY PRN
Qty: 1 INHALER | Refills: 3 | Status: SHIPPED | OUTPATIENT
Start: 2021-02-10 | End: 2022-02-08

## 2021-02-10 RX ORDER — AMLODIPINE BESYLATE 5 MG/1
5 TABLET ORAL DAILY
Qty: 30 TABLET | Refills: 3 | Status: SHIPPED | OUTPATIENT
Start: 2021-02-10 | End: 2022-02-08

## 2021-02-10 RX ORDER — ALBUTEROL SULFATE 2.5 MG/3ML
SOLUTION RESPIRATORY (INHALATION)
Qty: 450 ML | Refills: 3 | Status: SHIPPED | OUTPATIENT
Start: 2021-02-10 | End: 2022-06-20 | Stop reason: SDUPTHER

## 2021-02-10 RX ORDER — FAMOTIDINE 40 MG/1
20 TABLET, FILM COATED ORAL 2 TIMES DAILY
Qty: 30 TABLET | Refills: 3 | Status: SHIPPED
Start: 2021-02-10 | End: 2021-08-26 | Stop reason: ALTCHOICE

## 2021-02-10 SDOH — ECONOMIC STABILITY: FOOD INSECURITY: WITHIN THE PAST 12 MONTHS, THE FOOD YOU BOUGHT JUST DIDN'T LAST AND YOU DIDN'T HAVE MONEY TO GET MORE.: OFTEN TRUE

## 2021-02-10 SDOH — ECONOMIC STABILITY: TRANSPORTATION INSECURITY
IN THE PAST 12 MONTHS, HAS LACK OF TRANSPORTATION KEPT YOU FROM MEETINGS, WORK, OR FROM GETTING THINGS NEEDED FOR DAILY LIVING?: NO

## 2021-02-10 SDOH — ECONOMIC STABILITY: INCOME INSECURITY: HOW HARD IS IT FOR YOU TO PAY FOR THE VERY BASICS LIKE FOOD, HOUSING, MEDICAL CARE, AND HEATING?: HARD

## 2021-02-10 SDOH — ECONOMIC STABILITY: TRANSPORTATION INSECURITY
IN THE PAST 12 MONTHS, HAS THE LACK OF TRANSPORTATION KEPT YOU FROM MEDICAL APPOINTMENTS OR FROM GETTING MEDICATIONS?: NO

## 2021-02-10 ASSESSMENT — PATIENT HEALTH QUESTIONNAIRE - PHQ9
SUM OF ALL RESPONSES TO PHQ QUESTIONS 1-9: 0
1. LITTLE INTEREST OR PLEASURE IN DOING THINGS: 0
SUM OF ALL RESPONSES TO PHQ QUESTIONS 1-9: 0

## 2021-02-10 NOTE — PATIENT INSTRUCTIONS
Family and Housing Resources    Sean Rodríguez of [address] [place] [address]  Call 211  or - www. [address] [place] [address]. [address] [place] [address])  Call - [phone]  [email or web address]. [employer]    [facility]  [address]  Chris, [address] [place] [address]  [address] [facility] [place], [address] [place] [address]  [phone] /[phone]    Medicaid Application  Https://benefits. [email or web address]  [phone]    Home Energy Assistance Programs (HEAP)  [address]. Josiahma Boaz, [address] [facility]  [phone]    [facility] ( senior care)  [address] [facility], [address] [facility] (senior care)  [address], [address] [facility] [address]  [address] [facility]  www. [employer]    [employer]  [address] [place] [address], [address]  [address] [facility]  [address] [facility] [phone] [address] [facility]. .[facility] at [address] and [address] [facility] 49 of [address] [place] [address]  Call 211 or  www. [address] [place], [place] [address]. [address] [facility])  Call - [phone]  [email or web address]      [employer] / [employer] on [facility]  [address] [facility] Luiza Hodges   [phone]  *regular & [address]. [address], [address]  [phone]  *regular & special diets  60+ [facility] on [address]  [address] [facility]. [address], [address]  [phone], ext. [phone] [facility] on [address] [facility].   Latasha, [address] [facility]  [phone]  *regular & special diets  [address], [address]ju[address] Hassan and Rita only    [address] [facility] [address] on [facility] [address]  [address]  [facility], [place] Hazelalan Taylor [address] [place] [address] Road  [phone]  Eric [address] only    [address] Rebecca Sarah Ann on [address] [place]. [address]  [phone]  61 + and/or disables    Patient Education        Learning About Cutting Calories  How do calories affect your weight? Food gives your body energy. Energy from the food you eat is measured in calories. This energy keeps your heart beating, your brain active, and your muscles working. Your body needs a certain number of calories each day. After your body uses the calories it needs, it stores extra calories as fat. To lose weight safely, you have to eat fewer calories while eating in a healthy way. How many calories do you need each day? The more active you are, the more calories you need. When you are less active, you need fewer calories. How many calories you need each day also depends on several things, including your age and whether you are male or female. Here are some general guidelines for adults:  · Less active women and older adults need 1,600 to 2,000 calories each day. · Active women and less active men need 2,000 to 2,400 calories each day. · Active men need 2,400 to 3,000 calories each day. How can you cut calories and eat healthy meals? Whole grains, vegetables and fruits, and dried beans are good lower-calorie foods. They give you lots of nutrients and fiber. And they fill you up. Sweets, energy drinks, and soda pop are high in calories. They give you few nutrients and no fiber. Try to limit soda pop, fruit juice, and energy drinks. Drink water instead. Some fats can be part of a healthy diet. But cutting back on fats from highly processed foods like fast foods and many snack foods is a good way to lower the calories in your diet. Also, use smaller amounts of fats like butter, margarine, salad dressing, and mayonnaise.  Add fresh garlic, lemon, or herbs to your meals to add flavor without adding fat. Meats and dairy products can be a big source of hidden fats. Try to choose lean or low-fat versions of these products. Fat-free cookies, candies, chips, and frozen treats can still be high in sugar and calories. Some fat-free foods have more calories than regular ones. Eat fat-free treats in moderation, as you would other foods. If your favorite foods are high in fat, salt, sugar, or calories, limit how often you eat them. Eat smaller servings, or look for healthy substitutes. Fill up on fruits, vegetables, and whole grains. Eating at home  · Use meat as a side dish instead of as the main part of your meal.  · Try main dishes that use whole wheat pasta, brown rice, dried beans, or vegetables. · Find ways to cook with little or no fat, such as broiling, steaming, or grilling. · Use cooking spray instead of oil. If you use oil, use a monounsaturated oil, such as canola or olive oil. · Trim fat from meats before you cook them. · Drain off fat after you brown the meat or while you roast it. · Chill soups and stews after you cook them. Then skim the fat off the top after it hardens. Eating out  · Order foods that are broiled or poached rather than fried or breaded. · Cut back on the amount of butter or margarine that you use on bread. · Order sauces, gravies, and salad dressings on the side, and use only a little. · When you order pasta, choose tomato sauce rather than cream sauce. · Ask for salsa with your baked potato instead of sour cream, butter, cheese, or iyer. · Order meals in a small size instead of upgrading to a large. · Share an entree, or take part of your food home to eat as another meal.  · Share appetizers and desserts. Where can you learn more? Go to https://gomez.healthUpclique. org and sign in to your [employer] account. Enter Q247 in the [facility] box to learn more about \"Learning About Cutting Calories. \"     If you do not have an account, please click on the \"Sign Up Now\" link. Current as of: August 22, 2019               Content Version: 12.6  © 7360-1916 [employer]. Care instructions adapted under license by [address] If you have questions about a medical condition or this instruction, always ask your healthcare professional. [address] any warranty or liability for your use of this information.

## 2021-02-10 NOTE — PROGRESS NOTES
Chief Complaint   Patient presents with    Annual Exam     Has form for employer that needs to be completed. HPI: Angélica fuentesge 54 y.o. female presenting for       Severe Persistent Asthma   Patient is coming in with moderate persistent asthma. Patient reports that her symptoms do not feel the same as her typical asthma flares. Admits to a cough and wheezing. Denies any fevers. Patient works at the [employer] where several worker are out sick. Her daughter is also sick (she works on the [employer]). Both and daughter were tested for COVID on Monday and are still waiting for the results. F/u  Stalin has followed up with the pulmonary. Stalin was taking Advair and Singulair for her asthma. Spiriva was added to her list. reports that she has been doing better with the Spiriva. Admits that it is expensive but has new insurance. Obesity   Admits to weight gain. Was on steroids frequently that caused increase weight gain. Patient did admit to decreasing her exercises when she was sick but now has picked it back up.      HLD   Lab Results   Component Value Date    CHOL 240 (H) 11/27/2017    CHOL 244 (H) 01/24/2017    CHOL 259 (H) 11/21/2015     Lab Results   Component Value Date    TRIG 61 11/27/2017    TRIG 93 01/24/2017    TRIG 97 11/21/2015     Lab Results   Component Value Date    HDL 55 12/13/2019    HDL 76 (H) 11/27/2017    HDL 77 (H) 01/24/2017     Lab Results   Component Value Date    LDLCALC 147 (H) 12/13/2019    LDLCALC 152 (H) 11/27/2017    LDLCALC 148 (H) 01/24/2017     No results found for: LABVLDL, VLDL  Lab Results   Component Value Date    CHOLHDLRATIO 4.3 02/07/2013    CHOLHDLRATIO 3.9 01/14/2013    CHOLHDLRATIO 4.0 04/03/2012     Stalin takes pravastatin daily  Trying to work on diet     HTN  BP Readings from Last 20 Encounters:   02/10/21 120/88   01/14/21 132/84   11/11/20 (!) 138/90   07/28/20 120/82   07/24/20 130/82   07/22/20 (!) 148/103   04/09/20 120/86 VIAL VIA NEBULIZER TWICE DAILY 120 mL 2    Respiratory Therapy Supplies (NEBULIZER/TUBING/MOUTHPIECE) KIT 1 kit by Does not apply route daily as needed (use as directed) 1 kit 0    fluticasone (FLONASE) 50 MCG/ACT nasal spray 1 spray by Nasal route daily 2 Bottle 1    cetirizine (ZYRTEC) 10 MG tablet TK 1 T PO QD  0    Spacer/Aero-Holding Chambers (E-Z SPACER) GASTON 1 Device by Does not apply route daily as needed 1 Device 0     No current facility-administered medications for this visit. ROS  CONSTITUTIONAL: The patient denies fevers, chills, sweats and body ache. HEENT: Denies headache, blurry vision, eye pain, tinnitus, vertigo,  sore throat, neck or thyroid masses. RESPIRATORY: reports cough, sputum, wheezing, denies hemoptysis. CARDIAC: Denies chest pain, pressure, palpitations, Denies lower extremity edema. GASTROINTESTINAL: Denies abdominal pain, denies constipation, denies diarrhea, bleeding in the stools,   GENITOURINARY: Denies dysuria, hematuria, reports dysuria, and foul odor, denies urinary incontinence. NEUROLOGIC: Denies headaches, dizziness, syncope, weakness  MUSCULOSKELETAL: denies changes in range of motion, joint pain, stiffness. ENDOCRINOLOGY: Denies heat or cold intolerance. HEMATOLOGY: Denies easy bleeding or blood transfusion, reports anemia  DERMATOLOGY: Denies changes in moles or pigmentation changes. PSYCHIATRY: Denies depression, agitation, reports anxiety. Past Medical History:   Diagnosis Date    Allergic rhinitis     Anxiety disorder     Asthma, moderate persistent, poorly-controlled     intubated x 2013    Cleft lip     Divergent strabismus     Diverticulitis 2013    s/p partial colectomy, Dr Anne-Marie Gardiner Hyperlipidemia LDL goal <100     Hypertension     Obesity (BMI 30-39. 9)     Type 2 diabetes mellitus ([facility] [address] 2020    A1C 6.8        Past Surgical History:   Procedure Laterality Date     SECTION      x 3    CLEFT LIP REPAIR      OTHER SURGICAL HISTORY  12/28/13    Exploratory laparotomy with sigmoid colectomy, drainage pelvic abscess and formation end descending colostomy    OTHER SURGICAL HISTORY  3/20/14    Exploratory lapartomy with extensived lysis of adhesions, takedown of colostmy with primary stapled anastomosis, rigid sigmoidoscopy        Family History   Problem Relation Age of Onset    Heart Failure Mother         dec age   [facility] Diabetes Mother     Cancer Sister         pancreas, dec age 40        Social History     Socioeconomic History    Marital status:      Spouse name: Not on file    Number of children: 1    Years of education: Not on file    Highest education level: Not on file   Occupational History    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[employer]    Social Needs    Financial resource strain: Hard    Food insecurity     Worry: Often true     Inability: Often true    Transportation needs     Medical: No     Non-medical: No   Tobacco Use    Smoking status: Never Smoker    Smokeless tobacco: Never Used   Substance and Sexual Activity    Alcohol use: No     Alcohol/week: 0.0 standard drinks     Comment: social    Drug use: No    Sexual activity: Not on file   Lifestyle    Physical activity     Days per week: Not on file     Minutes per session: Not on file    Stress: Not on file   Relationships    Social connections     Talks on phone: Not on file     Gets together: Not on file     Attends Buddhist service: Not on file     Active member of club or organization: Not on file     Attends meetings of clubs or organizations: Not on file     Relationship status: Not on file    Intimate partner violence     Fear of current or ex partner: Not on file     Emotionally abused: Not on file     Physically abused: Not on file     Forced sexual activity: Not on file   Other Topics Concern    Not on file   Social History Narrative    Born in [facility], one of 3     Dec 30, 2015 to a [place]    3 children on [place]    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    Hobbies cooking, family, bowling        /88 (Site: Right Upper Arm, Position: Sitting, Cuff Size: Medium Adult)   Pulse 106   Temp 98.5 °F (36.9 °C) (Oral)   Ht 5' 5\" (1.651 m)   Wt 203 lb 9.6 oz (92.4 kg)   SpO2 96%   BMI 33.88 kg/m²        Physical Exam:    General appearance - alert, well appearing, and in no distress, obese  Mental Status - alert, oriented to person, place, and time  Eyes - pupils equal and reactive, extraocular eye movements intact   Ears - bilateral TM's and external ear canals normal   Nose - normal and patent, no erythema, discharge or polyps   Sinuses - Normal paranasal sinuses without tenderness   Throat - mucous membranes moist, pharynx normal without lesions   Neck - supple, no significant adenopathy   Thyroid - thyroid is normal in size without nodules or tenderness    Chest -coarse breath sounds with expiratory wheezing bilaterally. No signs of focal consolidation. Heart - normal rate, regular rhythm, normal S1, S2, no murmurs, rubs, clicks or gallops  Abdomen - soft, nontender, nondistended, no masses or organomegaly   Back exam - full range of motion, no tenderness, palpable spasm or pain on motion   Neurological - alert, oriented, normal speech, no focal findings or movement disorder noted   Musculoskeletal - no joint tenderness, deformity or swelling   Extremities - peripheral pulses normal, no pedal edema, no clubbing or cyanosis   Skin - normal coloration and turgor, no rashes, no suspicious skin lesions noted    Labs   No results found for: TSHREFLEX  TSH   Date Value Ref Range Status   09/18/2014 1.240 0.270 - 4.200 uIU/mL Final   06/18/2014 2.000 0.270 - 4.200 uIU/mL Final   05/14/2014 3.270 uIU/mL Final   12/27/2013 0.509 0.270 - 4.200 uIU/mL Final     Comment:     Effective 12/4/2013  Methodology and/or Reference Range has changed.    12/01/2013 1.511 0.550 - 4.780 uIU/mL Final   03/30/2013 1.634 0.550 - 4.780 uIU/mL Final   02/07/2013 2.275 0.550 - 4.780 uIU/mL Final   01/14/2013 0.627 0.550 - 4.780 uIU/mL Final   04/03/2012 2.059 0.550 - 4.780 uIU/mL Final     Lab Results   Component Value Date     07/22/2020    K 3.8 07/22/2020     07/22/2020    CO2 26 07/22/2020    BUN 8 07/22/2020    CREATININE 0.76 07/22/2020    GLUCOSE 142 (H) 07/22/2020    CALCIUM 9.2 07/22/2020    PROT 6.7 07/22/2020    LABALBU 4.1 07/22/2020    BILITOT 0.6 07/22/2020    ALKPHOS 70 07/22/2020    AST 22 07/22/2020    ALT 17 07/22/2020    LABGLOM >60.0 07/22/2020    GFRAA >60.0 07/22/2020    GLOB 2.6 07/22/2020       Lab Results   Component Value Date    LABA1C 6.8 07/24/2020     No results found for: EAG  Lab Results   Component Value Date    TSH 1.240 09/18/2014           A/P: Anel sarah 54 y.o. female presenting for       1. Severe persistent asthma, unspecified whether complicated  Continue with the Spiriva, Advair, Singulair. Patient symptoms are better controlled. Has a follow-up with pulmonary. - montelukast (SINGULAIR) 10 MG tablet; TAKE 1 TABLET BY MOUTH EVERY NIGHT AT BEDTIME  Dispense: 30 tablet; Refill: 3  - loratadine (CLARITIN) 10 MG tablet; Take 1 tablet by mouth daily  Dispense: 30 tablet; Refill: 3  - albuterol (PROVENTIL) (2.5 MG/3ML) 0.083% nebulizer solution; USE 1 VIAL VIA NEBULIZER EVERY 6 HOURS AS NEEDED  Dispense: 450 mL; Refill: 3  - fluticasone-salmeterol (ADVAIR DISKUS) 500-50 MCG/DOSE diskus inhaler; Inhale 1 puff into the lungs every 12 hours  Dispense: 60 each; Refill: 3  - Spacer/Aero-Holding Chambers (E-Z SPACER) GASTON; Us as directed for asthma  Dispense: 1 Device; Refill: 0  - benzonatate (TESSALON) 100 MG capsule; TAKE ONE TO TWO CAPSULES BY MOUTH THREE TIMES A DAY AS NEEDED FOR COUGH  Dispense: 60 capsule; Refill: 5    2. Gastroesophageal reflux disease    - famotidine (PEPCID) 40 MG tablet;  Take 0.5 tablets by mouth 2 times daily  Dispense: 30 tablet; Refill: 3    3. Seasonal allergies    - montelukast (SINGULAIR) 10 MG tablet; TAKE 1 TABLET BY MOUTH EVERY NIGHT AT BEDTIME  Dispense: 30 tablet; Refill: 3  - loratadine (CLARITIN) 10 MG tablet; Take 1 tablet by mouth daily  Dispense: 30 tablet; Refill: 3    4. Nausea    - ondansetron (ZOFRAN) 4 MG tablet; Take 1 tablet by mouth every 12 hours as needed for Nausea or Vomiting  Dispense: 30 tablet; Refill: 3    5. Screening mammogram, encounter for    - SERINA DIGITAL SCREEN W OR WO CAD BILATERAL; Future    6. Screening for colon cancer    - Cologuard (For External Results Only); Future    7. Encounter for screening for lipid disorder    - Lipid, Fasting; Future    8. Hypertension, unspecified type    - albuterol sulfate  (90 Base) MCG/ACT inhaler; Inhale 2 puffs into the lungs 4 times daily as needed for Wheezing  Dispense: 1 Inhaler; Refill: 3  - Comprehensive Metabolic Panel; Future    9. Anemia, unspecified type    - ferrous sulfate (FE TABS) 325 (65 Fe) MG EC tablet; Take 1 tablet by mouth every morning (before breakfast)  Dispense: 30 tablet; Refill: 3    10. Eye exam, routine    - [address], [place], [address], Optometry, Tianna Brito    11. Essential hypertension    - pravastatin (PRAVACHOL) 20 MG tablet; Take 1 tablet by mouth every evening  Dispense: 30 tablet; Refill: 3  - amLODIPine (NORVASC) 5 MG tablet; Take 1 tablet by mouth daily  Dispense: 30 tablet; Refill: 3    12. Hyperlipidemia, unspecified hyperlipidemia type    - pravastatin (PRAVACHOL) 20 MG tablet; Take 1 tablet by mouth every evening  Dispense: 30 tablet; Refill: 3    13. Severe persistent asthma without complication      14. Divergent strabismus      15. Hyperlipidemia LDL goal <100      16. Type 2 diabetes mellitus without complication, without long-term current use of insulin ([facility] [address]    I wonder if the sugar levels are secondary to the frequent steroid use patient was taking. We will repeat levels for further management if necessary.   Patient advised on low-carb diet.   Lab Results   Component Value Date    LABA1C 6.8 07/24/2020     No results found for: EAG    - Hemoglobin A1C; Future

## 2021-02-11 DIAGNOSIS — E78.5 HYPERLIPIDEMIA, UNSPECIFIED HYPERLIPIDEMIA TYPE: ICD-10-CM

## 2021-02-11 DIAGNOSIS — E11.9 TYPE 2 DIABETES MELLITUS WITHOUT COMPLICATION, WITHOUT LONG-TERM CURRENT USE OF INSULIN (HCC): Primary | ICD-10-CM

## 2021-02-11 RX ORDER — PRAVASTATIN SODIUM 40 MG
40 TABLET ORAL DAILY
Qty: 90 TABLET | Refills: 1 | Status: SHIPPED | OUTPATIENT
Start: 2021-02-11 | End: 2022-05-04

## 2021-03-02 NOTE — TELEPHONE ENCOUNTER
Patient calling to let  know Len Valente will be calling and asking questions regarding their health. Says she gives verbal ok to speak on her behalf. alert/follows commands

## 2021-03-12 PROBLEM — Z01.00 EYE EXAM, ROUTINE: Status: RESOLVED | Noted: 2021-02-10 | Resolved: 2021-03-12

## 2021-03-15 ENCOUNTER — TELEPHONE (OUTPATIENT)
Dept: FAMILY MEDICINE CLINIC | Age: 56
End: 2021-03-15

## 2021-03-15 NOTE — TELEPHONE ENCOUNTER
3 wk wheezing w out prednisone, cant see dr til 22nd (earliest available) pt has been doing breathing treatments, needs PA, says she cant keep working thru this and needs this asap. Please advise.

## 2021-03-18 NOTE — TELEPHONE ENCOUNTER
LMOVM asking for pt to call back to let us know what needs a PA. I have not rcvd' anything from the pharmacy stating something needs a PA done.

## 2021-03-22 DIAGNOSIS — J45.50 SEVERE PERSISTENT ASTHMA WITHOUT COMPLICATION: ICD-10-CM

## 2021-03-22 LAB
BASOPHILS ABSOLUTE: 0.1 K/UL (ref 0–0.2)
BASOPHILS RELATIVE PERCENT: 0.8 %
EOSINOPHILS ABSOLUTE: 0.9 K/UL (ref 0–0.7)
EOSINOPHILS RELATIVE PERCENT: 11.6 %
HCT VFR BLD CALC: 42.8 % (ref 37–47)
HEMOGLOBIN: 14.3 G/DL (ref 12–16)
LYMPHOCYTES ABSOLUTE: 1.3 K/UL (ref 1–4.8)
LYMPHOCYTES RELATIVE PERCENT: 17.4 %
MCH RBC QN AUTO: 30.8 PG (ref 27–31.3)
MCHC RBC AUTO-ENTMCNC: 33.4 % (ref 33–37)
MCV RBC AUTO: 92.3 FL (ref 82–100)
MONOCYTES ABSOLUTE: 0.6 K/UL (ref 0.2–0.8)
MONOCYTES RELATIVE PERCENT: 7.4 %
NEUTROPHILS ABSOLUTE: 4.8 K/UL (ref 1.4–6.5)
NEUTROPHILS RELATIVE PERCENT: 62.8 %
PDW BLD-RTO: 14.9 % (ref 11.5–14.5)
PLATELET # BLD: 362 K/UL (ref 130–400)
RBC # BLD: 4.64 M/UL (ref 4.2–5.4)
WBC # BLD: 7.6 K/UL (ref 4.8–10.8)

## 2021-03-24 LAB
ALLERGEN ASPERGILLUS ALTERNATA IGE: <0.1 KU/L
ALLERGEN ASPERGILLUS FUMIGATUS IGE: <0.1 KU/L
ALLERGEN BERMUDA GRASS IGE: <0.1 KU/L
ALLERGEN CAT DANDER IGE: <0.1 KU/L
ALLERGEN COMMON SHORT RAGWEED IGE: <0.1 KU/L
ALLERGEN COTTONWOOD: <0.1 KU/L
ALLERGEN COW EPITHELIA/DANDER IGE: <0.1 KU/L
ALLERGEN DOG DANDER IGE: <0.1 KU/L
ALLERGEN ELM IGE: <0.1 KU/L
ALLERGEN ENGLISH PLANTAIN: <0.1 KU/L
ALLERGEN GREER HOUSE DUST: <0.1 KU/L
ALLERGEN HORMODENDRUM HORDEI IGE: <0.1 KU/L
ALLERGEN HORSE DANDER: <0.1 KU/L
ALLERGEN JOHNSON GRASS IGE: <0.1 KU/L
ALLERGEN JUNE KENTUCKY BLUEGRASS: <0.1 KU/L
ALLERGEN LAMB'S QUARTERS: <0.1 KU/L
ALLERGEN MESQUITE IGE: <0.1 KU/L
ALLERGEN MITE DUST FARINAE IGE: <0.1 KU/L
ALLERGEN MITE DUST PTERONYSSINUS IGE: <0.1 KU/L
ALLERGEN MOUNTAIN CEDAR: <0.1 KU/L
ALLERGEN MUGWORT IGE: <0.1 KU/L
ALLERGEN OAK TREE IGE: <0.1 KU/L
ALLERGEN OLIVE TREE IGE: <0.1 KU/L
ALLERGEN PENICILLIUM NOTATUM: <0.1 KU/L
ALLERGEN PERENNIAL RYE GRASS IGE: <0.1 KU/L
ALLERGEN RUSSIAN THISTLE IGE: <0.1 KU/L
ALLERGEN SEE NOTE: ABNORMAL
ALLERGEN TIMOTHY GRASS: <0.1 KU/L
IGE: 362 KU/L

## 2021-03-26 ENCOUNTER — OFFICE VISIT (OUTPATIENT)
Dept: PULMONOLOGY | Age: 56
End: 2021-03-26
Payer: COMMERCIAL

## 2021-03-26 VITALS
HEART RATE: 102 BPM | HEIGHT: 65 IN | WEIGHT: 202.4 LBS | DIASTOLIC BLOOD PRESSURE: 106 MMHG | BODY MASS INDEX: 33.72 KG/M2 | TEMPERATURE: 96.4 F | SYSTOLIC BLOOD PRESSURE: 146 MMHG | RESPIRATION RATE: 18 BRPM | OXYGEN SATURATION: 98 %

## 2021-03-26 DIAGNOSIS — R09.81 NASAL CONGESTION: ICD-10-CM

## 2021-03-26 DIAGNOSIS — J45.50 SEVERE PERSISTENT ASTHMA WITHOUT COMPLICATION: Primary | ICD-10-CM

## 2021-03-26 DIAGNOSIS — E66.09 CLASS 1 OBESITY DUE TO EXCESS CALORIES WITHOUT SERIOUS COMORBIDITY WITH BODY MASS INDEX (BMI) OF 33.0 TO 33.9 IN ADULT: ICD-10-CM

## 2021-03-26 DIAGNOSIS — G47.33 OSA (OBSTRUCTIVE SLEEP APNEA): ICD-10-CM

## 2021-03-26 DIAGNOSIS — J30.9 ALLERGIC RHINITIS, UNSPECIFIED SEASONALITY, UNSPECIFIED TRIGGER: ICD-10-CM

## 2021-03-26 DIAGNOSIS — K21.9 GASTROESOPHAGEAL REFLUX DISEASE WITHOUT ESOPHAGITIS: ICD-10-CM

## 2021-03-26 PROCEDURE — 99215 OFFICE O/P EST HI 40 MIN: CPT | Performed by: INTERNAL MEDICINE

## 2021-03-26 RX ORDER — AZELASTINE 1 MG/ML
1 SPRAY, METERED NASAL 2 TIMES DAILY
Qty: 2 BOTTLE | Refills: 1 | Status: SHIPPED | OUTPATIENT
Start: 2021-03-26 | End: 2022-06-20 | Stop reason: SDUPTHER

## 2021-03-26 RX ORDER — PREDNISONE 10 MG/1
40 TABLET ORAL DAILY
Qty: 20 TABLET | Refills: 0 | Status: SHIPPED | OUTPATIENT
Start: 2021-03-26 | End: 2021-03-31

## 2021-03-26 NOTE — PROGRESS NOTES
Subjective:     Concetta sarah is a 54 y.o. female who complains today of:     Chief Complaint   Patient presents with    Follow-up     2 Month F/U for Shortness of breath    Results     Chest Xray, Labs and PFT    Wheezing       HPI  Patient presents for asthma    Patient is feeling short of breath, she has not been feeling good since she done her spirometry, wheezing, frequent use of rescue inhalers, cough productive of clear phlegm, mild nasal congestion, no heartburn, no lower extremity edema, no fever no chills, weight is stable, sleeps okay, no nausea no vomiting. Allergies:  Bacitracin, Butalbital-aspirin-caffeine, Codeine, Floxin [ofloxacin], Rocephin [ceftriaxone sodium], Tomato, Asa [aspirin], Lisinopril, and Azithromycin  Past Medical History:   Diagnosis Date    Allergic rhinitis     Anxiety disorder     Cleft lip     Divergent strabismus     Diverticulitis 2013    s/p partial colectomy, Dr Tian Brewer Hyperlipidemia LDL goal <100     Hypertension     Obesity (BMI 30-39. 9)     Severe persistent asthma without complication     intubated x 2013    Type 2 diabetes mellitus ([facility] [address] 2020    A1C 6.8     Past Surgical History:   Procedure Laterality Date     SECTION      x 3    CLEFT LIP REPAIR      OTHER SURGICAL HISTORY  13    Exploratory laparotomy with sigmoid colectomy, drainage pelvic abscess and formation end descending colostomy    OTHER SURGICAL HISTORY  3/20/14    Exploratory lapartomy with extensived lysis of adhesions, takedown of colostmy with primary stapled anastomosis, rigid sigmoidoscopy     Family History   Problem Relation Age of Onset    Heart Failure Mother         dec age   [facility] Diabetes Mother     Cancer Sister         pancreas, dec age 40     Social History     Socioeconomic History    Marital status:      Spouse name: Not on file    Number of children: 1    Years of education: Not on file    Highest education level: Not on file   Occupational History    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    Financial resource strain: Hard    Food insecurity     Worry: Often true     Inability: Often true    Transportation needs     Medical: No     Non-medical: No   Tobacco Use    Smoking status: Never Smoker    Smokeless tobacco: Never Used   Substance and Sexual Activity    Alcohol use: No     Alcohol/week: 0.0 standard drinks     Comment: social    Drug use: No    Sexual activity: Not on file   Lifestyle    Physical activity     Days per week: Not on file     Minutes per session: Not on file    Stress: Not on file   Relationships    Social connections     Talks on phone: Not on file     Gets together: Not on file     Attends Sabianist service: Not on file     Active member of club or organization: Not on file     Attends meetings of clubs or organizations: Not on file     Relationship status: Not on file    Intimate partner violence     Fear of current or ex partner: Not on file     Emotionally abused: Not on file     Physically abused: Not on file     Forced sexual activity: Not on file   Other Topics Concern    Not on file   Social History Narrative    Born in [facility] [address] [facility], one of 3     Dec 30, 2015 to a [place]    3 children on [facility]    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     Current Outpatient Medications   Medication Sig Dispense Refill    benralizumab (FASENRA) 30 MG/ML SOSY injection 1 injection SC every 4 weeks x 3 doses then q 8 weeks after that 1 mL 0    polyethylene glycol (GLYCOLAX) 17 GM/SCOOP powder USE 17GMS AS DIRECTED ONCE DAILY AS NEEDED FOR CONSTIPATION 765 g 5    pravastatin (PRAVACHOL) 40 MG tablet Take 1 tablet by mouth daily 90 tablet 1    famotidine (PEPCID) 40 MG tablet Take 0.5 tablets by mouth 2 times daily 30 tablet 3    montelukast (SINGULAIR) 10 MG tablet TAKE 1 TABLET BY MOUTH EVERY NIGHT AT BEDTIME 30 tablet 3    loratadine (CLARITIN) 10 MG tablet Take 1 tablet by mouth daily 30 tablet 3    ondansetron (ZOFRAN) 4 MG tablet Take 1 tablet by mouth every 12 hours as needed for Nausea or Vomiting 30 tablet 3    albuterol (PROVENTIL) (2.5 MG/3ML) 0.083% nebulizer solution USE 1 VIAL VIA NEBULIZER EVERY 6 HOURS AS NEEDED 450 mL 3    albuterol sulfate  (90 Base) MCG/ACT inhaler Inhale 2 puffs into the lungs 4 times daily as needed for Wheezing 1 Inhaler 3    vitamin B-12 (CYANOCOBALAMIN) 100 MCG tablet Take 1 tablet by mouth daily 30 tablet 3    folic acid (V-R FOLIC ACID) 561 MCG tablet Take 1 tablet by mouth daily 30 tablet 3    fluticasone-salmeterol (ADVAIR DISKUS) 500-50 MCG/DOSE diskus inhaler Inhale 1 puff into the lungs every 12 hours 60 each 3    ferrous sulfate (FE TABS) 325 (65 Fe) MG EC tablet Take 1 tablet by mouth every morning (before breakfast) 30 tablet 3    Respiratory Therapy Supplies (NEBULIZER/TUBING/MOUTHPIECE) KIT 1 kit by Does not apply route daily as needed (use as directed) 1 kit 0    Spacer/Aero-Holding Chambers (E-Z SPACER) GASTON Us as directed for asthma 1 Device 0    benzonatate (TESSALON) 100 MG capsule TAKE ONE TO TWO CAPSULES BY MOUTH THREE TIMES A DAY AS NEEDED FOR COUGH 60 capsule 5    amLODIPine (NORVASC) 5 MG tablet Take 1 tablet by mouth daily 30 tablet 3    tiotropium (SPIRIVA RESPIMAT) 1.25 MCG/ACT AERS inhaler Inhale 2 puffs into the lungs daily 1 Inhaler 3    fluticasone (FLONASE) 50 MCG/ACT nasal spray 1 spray by Nasal route daily 2 Bottle 1    predniSONE (DELTASONE) 20 MG tablet TAKE TWO TABLETS BY MOUTH EVERY DAY FOR 5 DAYS (Patient not taking: Reported on 3/26/2021) 10 tablet 0     No current facility-administered medications for this visit. Objective:     Vitals:    03/26/21 1256 03/26/21 1305   BP: (!) 155/104 (!) 146/106   Site: Right Upper Arm Left Upper Arm   Position: Sitting Sitting   Cuff Size: Medium Adult Medium Adult   Pulse: 102    Resp: 18    Temp: 96.4 °F (35.8 °C)    TempSrc: Tympanic    SpO2: 98%    Weight: 202 lb 6.4 oz (91.8 kg)    Height: 5' 5\" (1.651 m)          Physical Exam  Constitutional:       General: She is not in acute distress. Appearance: She is well-developed. She is not diaphoretic. HENT:      Head: Normocephalic and atraumatic. Eyes:      Conjunctiva/sclera: Conjunctivae normal.      Pupils: Pupils are equal, round, and reactive to light. Neck:      Musculoskeletal: Normal range of motion and neck supple. Cardiovascular:      Rate and Rhythm: Normal rate and regular rhythm. Heart sounds: No murmur. No friction rub. No gallop. Pulmonary:      Effort: Pulmonary effort is normal. No respiratory distress. Breath sounds: Wheezing present. No rales. Chest:      Chest wall: No tenderness. Abdominal:      General: There is no distension. Palpations: Abdomen is soft. Tenderness: There is no abdominal tenderness. There is no rebound. Musculoskeletal:         General: No tenderness. Right lower leg: No edema. Left lower leg: No edema. Lymphadenopathy:      Cervical: No cervical adenopathy. Skin:     General: Skin is warm and dry. Findings: No erythema. Neurological:      Mental Status: She is alert and oriented to person, place, and time. Psychiatric:         Judgment: Judgment normal.         Imaging studies reviewed by me chest x-ray is unremarkable  Lab results reviewed in chart  PFT February 2021, FEV1 57%, obstruction is not reversible, air trapping, normal DLCO       Assessment and Plan       Diagnosis Orders   1.  Severe persistent asthma without complication  benralizumab (FASENRA) about 6 weeks (around 5/7/2021). I spent 45 min with this patient, greater the 50% of this time was spent in counseling and/or coordinating of care.       Olivia Rodrigues MD

## 2021-04-14 DIAGNOSIS — B37.0 ORAL CANDIDA: Primary | ICD-10-CM

## 2021-04-14 NOTE — TELEPHONE ENCOUNTER
Patient has thrush. Would you send a medication for her. Her breathing treatments are causing it to happen. She uses [facility].

## 2021-04-14 NOTE — TELEPHONE ENCOUNTER
Will send medication to pharmacy. Please have patient rinse her mouth after taking her steroid inhalers. This will help to reduce the thrush.

## 2021-05-11 ENCOUNTER — TELEPHONE (OUTPATIENT)
Dept: FAMILY MEDICINE CLINIC | Age: 56
End: 2021-05-11

## 2021-05-12 ENCOUNTER — OFFICE VISIT (OUTPATIENT)
Dept: PULMONOLOGY | Age: 56
End: 2021-05-12
Payer: COMMERCIAL

## 2021-05-12 VITALS
BODY MASS INDEX: 34.32 KG/M2 | OXYGEN SATURATION: 99 % | DIASTOLIC BLOOD PRESSURE: 84 MMHG | WEIGHT: 206 LBS | HEIGHT: 65 IN | HEART RATE: 109 BPM | TEMPERATURE: 96.5 F | RESPIRATION RATE: 16 BRPM | SYSTOLIC BLOOD PRESSURE: 130 MMHG

## 2021-05-12 DIAGNOSIS — J45.50 SEVERE PERSISTENT ASTHMA WITHOUT COMPLICATION: Primary | ICD-10-CM

## 2021-05-12 DIAGNOSIS — K21.9 GASTROESOPHAGEAL REFLUX DISEASE WITHOUT ESOPHAGITIS: ICD-10-CM

## 2021-05-12 DIAGNOSIS — R09.81 NASAL CONGESTION: ICD-10-CM

## 2021-05-12 DIAGNOSIS — E66.09 CLASS 1 OBESITY DUE TO EXCESS CALORIES WITHOUT SERIOUS COMORBIDITY WITH BODY MASS INDEX (BMI) OF 33.0 TO 33.9 IN ADULT: ICD-10-CM

## 2021-05-12 PROCEDURE — 99213 OFFICE O/P EST LOW 20 MIN: CPT | Performed by: INTERNAL MEDICINE

## 2021-05-12 NOTE — PROGRESS NOTES
Subjective:     Coretta sarah is a 54 y.o. female who complains today of:     Chief Complaint   Patient presents with    Follow-up     6 Week F/U for Severe persistent asthma        HPI  Patient presents for evaluation of asthma    She is doing better, Fasenra significantly improved her symptoms, she is currently on Advair, Spiriva, Singulair, and Fasenra. She is 2 weeks late for her second dose, she denies chest pain, no shortness of breath, she is able to do her daily activities, she used her rescue inhaler only twice since I seen her last, no fever or chills, no coughing, minimal nasal congestion, no lower extremity edema, weight is stable, she had worsening symptoms on Mother's Day after grilling outside but otherwise none, no nighttime symptoms of wheezing or coughing. Allergies:  Bacitracin, Butalbital-aspirin-caffeine, Codeine, Floxin [ofloxacin], Rocephin [ceftriaxone sodium], Tomato, Asa [aspirin], Lisinopril, and Azithromycin  Past Medical History:   Diagnosis Date    Allergic rhinitis     Anxiety disorder     Cleft lip     Divergent strabismus     Diverticulitis 2013    s/p partial colectomy, Dr Lou Valdes Hyperlipidemia LDL goal <100     Hypertension     Obesity (BMI 30-39. 9)     Severe persistent asthma without complication     intubated x 2013    Type 2 diabetes mellitus ([facility] [address] 2020    A1C 6.8     Past Surgical History:   Procedure Laterality Date     SECTION      x 3    CLEFT LIP REPAIR      OTHER SURGICAL HISTORY  13    Exploratory laparotomy with sigmoid colectomy, drainage pelvic abscess and formation end descending colostomy    OTHER SURGICAL HISTORY  3/20/14    Exploratory lapartomy with extensived lysis of adhesions, takedown of colostmy with primary stapled anastomosis, rigid sigmoidoscopy     Family History   Problem Relation Age of Onset    Heart Failure Mother         dec age   [de-identified] Diabetes Mother     Cancer Sister         pancreas, injection 1 injection SC every 4 weeks x 3 doses then q 8 weeks after that (Patient not taking: Reported on 5/12/2021) 1 mL 0    benzonatate (TESSALON) 100 MG capsule TAKE ONE TO TWO CAPSULES BY MOUTH THREE TIMES A DAY AS NEEDED FOR COUGH (Patient not taking: Reported on 5/12/2021) 60 capsule 5     No current facility-administered medications for this visit. Objective:     Vitals:    05/12/21 0831   BP: 130/84   Site: Right Upper Arm   Position: Sitting   Cuff Size: Medium Adult   Pulse: 109   Resp: 16   Temp: 96.5 °F (35.8 °C)   TempSrc: Tympanic   SpO2: 99%   Weight: 206 lb (93.4 kg)   Height: 5' 5\" (1.651 m)         Physical Exam  Constitutional:       General: She is not in acute distress. Appearance: She is well-developed. She is not diaphoretic. HENT:      Head: Normocephalic and atraumatic. Eyes:      Conjunctiva/sclera: Conjunctivae normal.      Pupils: Pupils are equal, round, and reactive to light. Neck:      Musculoskeletal: Normal range of motion and neck supple. Cardiovascular:      Rate and Rhythm: Normal rate and regular rhythm. Heart sounds: No murmur. No friction rub. No gallop. Pulmonary:      Effort: Pulmonary effort is normal. No respiratory distress. Breath sounds: Normal breath sounds. No wheezing or rales. Chest:      Chest wall: No tenderness. Abdominal:      General: There is no distension. Palpations: Abdomen is soft. Tenderness: There is no abdominal tenderness. There is no rebound. Musculoskeletal:         General: No tenderness. Right lower leg: No edema. Left lower leg: No edema. Lymphadenopathy:      Cervical: No cervical adenopathy. Skin:     General: Skin is warm and dry. Findings: No erythema. Neurological:      Mental Status: She is alert and oriented to person, place, and time.    Psychiatric:         Judgment: Judgment normal.         Imaging studies reviewed by me chest x-ray February 2021 is unremarkable  Lab results reviewed in chart  PFT February 2021, FEV1 57%, fixed obstruction, air trapping with normal DLCO       Assessment and Plan       Diagnosis Orders   1. Severe persistent asthma without complication     2. Class 1 obesity due to excess calories without serious comorbidity with body mass index (BMI) of 33.0 to 33.9 in adult     3. Nasal congestion     4. Gastroesophageal reflux disease without esophagitis       · Severe persistent asthma, with fixed obstruction, responded to [place], continue Advair, Spiriva, and Singulair and continue [place], reviewed with the patient need to use Fasenra every 4 weeks for 3 doses she has 2 doses remaining we will give 1 dose today. Then every 8 weeks after that. Patient tends to forget, will schedule her to come back in 4 weeks for the second dose. · Obesity weight loss is recommended  · Nasal congestion continue Flonase and azelastine  · GERD symptoms controlled, continue Pepcid. No orders of the defined types were placed in this encounter. No orders of the defined types were placed in this encounter. Discussed with patient the importance of exercise and weight control and  overall health and well-being. Reviewed with the patient: current clinical status, medications, activities and diet. Side effects, adverse effects of the medication prescribed today, as well as treatment plan and result expectations have been discussed with the patient who expresses understanding and desires to proceed. Return in about 4 weeks (around 6/9/2021).       Tucker Rowell MD

## 2021-07-29 ENCOUNTER — OFFICE VISIT (OUTPATIENT)
Dept: FAMILY MEDICINE CLINIC | Age: 56
End: 2021-07-29
Payer: COMMERCIAL

## 2021-07-29 VITALS
HEART RATE: 89 BPM | OXYGEN SATURATION: 98 % | WEIGHT: 207 LBS | SYSTOLIC BLOOD PRESSURE: 132 MMHG | DIASTOLIC BLOOD PRESSURE: 84 MMHG | TEMPERATURE: 97.4 F | RESPIRATION RATE: 12 BRPM | HEIGHT: 65 IN | BODY MASS INDEX: 34.49 KG/M2

## 2021-07-29 DIAGNOSIS — Z01.818 ENCOUNTER FOR PREADMISSION TESTING: Primary | ICD-10-CM

## 2021-07-29 DIAGNOSIS — H61.20 IMPACTED CERUMEN, UNSPECIFIED LATERALITY: ICD-10-CM

## 2021-07-29 DIAGNOSIS — Z12.11 COLON CANCER SCREENING: ICD-10-CM

## 2021-07-29 DIAGNOSIS — I10 HYPERTENSION, UNSPECIFIED TYPE: ICD-10-CM

## 2021-07-29 DIAGNOSIS — I10 ESSENTIAL HYPERTENSION: ICD-10-CM

## 2021-07-29 DIAGNOSIS — D50.0 IRON DEFICIENCY ANEMIA DUE TO CHRONIC BLOOD LOSS: ICD-10-CM

## 2021-07-29 DIAGNOSIS — E66.09 CLASS 1 OBESITY DUE TO EXCESS CALORIES WITH SERIOUS COMORBIDITY AND BODY MASS INDEX (BMI) OF 30.0 TO 30.9 IN ADULT: ICD-10-CM

## 2021-07-29 DIAGNOSIS — I10 ESSENTIAL HYPERTENSION: Primary | ICD-10-CM

## 2021-07-29 DIAGNOSIS — E11.9 TYPE 2 DIABETES MELLITUS WITHOUT COMPLICATION, WITHOUT LONG-TERM CURRENT USE OF INSULIN (HCC): ICD-10-CM

## 2021-07-29 DIAGNOSIS — K21.9 GASTROESOPHAGEAL REFLUX DISEASE WITHOUT ESOPHAGITIS: ICD-10-CM

## 2021-07-29 DIAGNOSIS — J45.41 MODERATE PERSISTENT ASTHMA WITH EXACERBATION: ICD-10-CM

## 2021-07-29 DIAGNOSIS — F41.9 ANXIETY: ICD-10-CM

## 2021-07-29 DIAGNOSIS — E78.5 HYPERLIPIDEMIA, UNSPECIFIED HYPERLIPIDEMIA TYPE: ICD-10-CM

## 2021-07-29 DIAGNOSIS — Z12.31 ENCOUNTER FOR SCREENING MAMMOGRAM FOR MALIGNANT NEOPLASM OF BREAST: ICD-10-CM

## 2021-07-29 LAB
ALBUMIN SERPL-MCNC: 4.3 G/DL (ref 3.5–4.6)
ALP BLD-CCNC: 98 U/L (ref 40–130)
ALT SERPL-CCNC: 10 U/L (ref 0–33)
ANION GAP SERPL CALCULATED.3IONS-SCNC: 12 MEQ/L (ref 9–15)
AST SERPL-CCNC: 18 U/L (ref 0–35)
BILIRUB SERPL-MCNC: 1 MG/DL (ref 0.2–0.7)
BUN BLDV-MCNC: 10 MG/DL (ref 6–20)
CALCIUM SERPL-MCNC: 9.7 MG/DL (ref 8.5–9.9)
CHLORIDE BLD-SCNC: 101 MEQ/L (ref 95–107)
CHOLESTEROL, FASTING: 261 MG/DL (ref 0–199)
CO2: 26 MEQ/L (ref 20–31)
CREAT SERPL-MCNC: 0.84 MG/DL (ref 0.5–0.9)
GFR AFRICAN AMERICAN: >60
GFR NON-AFRICAN AMERICAN: >60
GLOBULIN: 3 G/DL (ref 2.3–3.5)
GLUCOSE BLD-MCNC: 105 MG/DL (ref 70–99)
HBA1C MFR BLD: 5.9 % (ref 4.8–5.9)
HDLC SERPL-MCNC: 56 MG/DL (ref 40–59)
LDL CHOLESTEROL CALCULATED: 188 MG/DL (ref 0–129)
POTASSIUM SERPL-SCNC: 3.9 MEQ/L (ref 3.4–4.9)
SODIUM BLD-SCNC: 139 MEQ/L (ref 135–144)
TOTAL PROTEIN: 7.3 G/DL (ref 6.3–8)
TRIGLYCERIDE, FASTING: 83 MG/DL (ref 0–150)

## 2021-07-29 PROCEDURE — 99214 OFFICE O/P EST MOD 30 MIN: CPT | Performed by: FAMILY MEDICINE

## 2021-07-29 RX ORDER — PHENTERMINE HYDROCHLORIDE 37.5 MG/1
37.5 TABLET ORAL
Qty: 30 TABLET | Refills: 0 | Status: SHIPPED | OUTPATIENT
Start: 2021-07-29 | End: 2021-08-28

## 2021-07-29 RX ORDER — ALPRAZOLAM 0.5 MG/1
TABLET ORAL
Qty: 30 TABLET | Refills: 2 | Status: SHIPPED | OUTPATIENT
Start: 2021-07-29 | End: 2021-10-29

## 2021-07-29 NOTE — PROGRESS NOTES
Chief Complaint   Patient presents with    Check-Up     wellness exam for insurance    Anxiety    Insomnia        HPI: Mana Monique lodge 54 y.o. female presenting for       Severe Persistent Asthma   Patient is coming in with moderate persistent asthma. Patient reports that her symptoms do not feel the same as her typical asthma flares. Admits to a cough and wheezing. Denies any fevers. Patient works at the [employer] where several worker are out sick. Her daughter is also sick (she works on the [employer]). Both and daughter were tested for COVID on Monday and are still waiting for the results. F/u  Stalin has followed up with the pulmonary. Stalin was taking Advair and Singulair for her asthma. Spiriva was added to her list. reports that she has been doing better with the Spiriva. Admits that it is expensive but has new insurance. F/u   Patient is currently on Spiriva, Advair, Singulair. Patient is taking the fasenra which is helping a lot. Patient denies any issues or concerns on the medication. Obesity   Admits to weight gain. Was on steroids frequently that caused increase weight gain. Patient did admit to decreasing her exercises when she was sick but now has picked it back up. F/u   Still gaining weight   Patient like to see what agent she can use to help with aid in weight loss.     HLD   Lab Results   Component Value Date    CHOL 240 (H) 11/27/2017    CHOL 244 (H) 01/24/2017    CHOL 259 (H) 11/21/2015     Lab Results   Component Value Date    TRIG 61 11/27/2017    TRIG 93 01/24/2017    TRIG 97 11/21/2015     Lab Results   Component Value Date    HDL 56 07/29/2021    HDL 64 (H) 02/10/2021    HDL 55 12/13/2019     Lab Results   Component Value Date    LDLCALC 188 (H) 07/29/2021    LDLCALC 166 (H) 02/10/2021    LDLCALC 147 (H) 12/13/2019     No results found for: LABVLDL, VLDL  Lab Results   Component Value Date    CHOLHDLRATIO 4.3 02/07/2013    CHOLHDLRATIO 3.9 01/14/2013 CHOLHDLRATIO 4.0 04/03/2012     Stalin takes pravastatin daily  Trying to work on diet     HTN  BP Readings from Last 20 Encounters:   07/29/21 132/84   05/12/21 130/84   03/26/21 (!) 146/106   02/10/21 120/88   01/14/21 132/84   11/11/20 (!) 138/90   07/28/20 120/82   07/24/20 130/82   07/22/20 (!) 148/103   04/09/20 120/86   12/13/19 130/82   06/04/19 (!) 140/90   05/17/19 (!) 140/100   11/13/18 (!) 148/98   09/17/18 (!) 142/100   08/19/18 (!) 131/94   06/28/18 120/80   11/27/17 120/80   10/25/17 135/72   08/01/17 (!) 140/93   ]  Adhering to a low sodium diet   Compliant with 5 mg of amlodipine. Current Outpatient Medications   Medication Sig Dispense Refill    phentermine (ADIPEX-P) 37.5 MG tablet Take 1 tablet by mouth every morning (before breakfast) for 30 days.  30 tablet 0    carbamide peroxide (DEBROX) 6.5 % otic solution Place 5 drops into both ears 2 times daily for 3 days 15 mL 0    ALPRAZolam (XANAX) 0.5 MG tablet TAKE ONE TABLET BY MOUTH EVERY DAY AS NEEDED FOR ANXIETY 30 tablet 2    folic acid (FOLVITE) 996 MCG tablet TAKE ONE TABLET BY MOUTH EVERY DAY 30 tablet 2    azelastine (ASTELIN) 0.1 % nasal spray 1 spray by Nasal route 2 times daily Use in each nostril as directed 2 Bottle 1    polyethylene glycol (GLYCOLAX) 17 GM/SCOOP powder USE 17GMS AS DIRECTED ONCE DAILY AS NEEDED FOR CONSTIPATION 765 g 5    pravastatin (PRAVACHOL) 40 MG tablet Take 1 tablet by mouth daily 90 tablet 1    famotidine (PEPCID) 40 MG tablet Take 0.5 tablets by mouth 2 times daily 30 tablet 3    montelukast (SINGULAIR) 10 MG tablet TAKE 1 TABLET BY MOUTH EVERY NIGHT AT BEDTIME 30 tablet 3    loratadine (CLARITIN) 10 MG tablet Take 1 tablet by mouth daily 30 tablet 3    ondansetron (ZOFRAN) 4 MG tablet Take 1 tablet by mouth every 12 hours as needed for Nausea or Vomiting 30 tablet 3    albuterol (PROVENTIL) (2.5 MG/3ML) 0.083% nebulizer solution USE 1 VIAL VIA NEBULIZER EVERY 6 HOURS AS NEEDED 450 mL 3  albuterol sulfate  (90 Base) MCG/ACT inhaler Inhale 2 puffs into the lungs 4 times daily as needed for Wheezing 1 Inhaler 3    vitamin B-12 (CYANOCOBALAMIN) 100 MCG tablet Take 1 tablet by mouth daily 30 tablet 3    fluticasone-salmeterol (ADVAIR DISKUS) 500-50 MCG/DOSE diskus inhaler Inhale 1 puff into the lungs every 12 hours 60 each 3    ferrous sulfate (FE TABS) 325 (65 Fe) MG EC tablet Take 1 tablet by mouth every morning (before breakfast) 30 tablet 3    Respiratory Therapy Supplies (NEBULIZER/TUBING/MOUTHPIECE) KIT 1 kit by Does not apply route daily as needed (use as directed) 1 kit 0    Spacer/Aero-Holding Chambers (E-Z SPACER) GASTON Us as directed for asthma 1 Device 0    benzonatate (TESSALON) 100 MG capsule TAKE ONE TO TWO CAPSULES BY MOUTH THREE TIMES A DAY AS NEEDED FOR COUGH (Patient not taking: Reported on 5/12/2021) 60 capsule 5    amLODIPine (NORVASC) 5 MG tablet Take 1 tablet by mouth daily 30 tablet 3    tiotropium (SPIRIVA RESPIMAT) 1.25 MCG/ACT AERS inhaler Inhale 2 puffs into the lungs daily 1 Inhaler 3    fluticasone (FLONASE) 50 MCG/ACT nasal spray 1 spray by Nasal route daily 2 Bottle 1     No current facility-administered medications for this visit. ROS  CONSTITUTIONAL: The patient denies fevers, chills, sweats and body ache. HEENT: Denies headache, blurry vision, eye pain, tinnitus, vertigo,  sore throat, neck or thyroid masses. RESPIRATORY: reports cough, sputum, wheezing, denies hemoptysis. CARDIAC: Denies chest pain, pressure, palpitations, Denies lower extremity edema. GASTROINTESTINAL: Denies abdominal pain, denies constipation, denies diarrhea, bleeding in the stools,   GENITOURINARY: Denies dysuria, hematuria, reports dysuria, and foul odor, denies urinary incontinence. NEUROLOGIC: Denies headaches, dizziness, syncope, weakness  MUSCULOSKELETAL: denies changes in range of motion, joint pain, stiffness. ENDOCRINOLOGY: Denies heat or cold intolerance. HEMATOLOGY: Denies easy bleeding or blood transfusion, reports anemia  DERMATOLOGY: Denies changes in moles or pigmentation changes. PSYCHIATRY: Denies depression, agitation, reports anxiety. Past Medical History:   Diagnosis Date    Allergic rhinitis     Anxiety disorder     Cleft lip     Divergent strabismus     Diverticulitis 2013    s/p partial colectomy, Dr Modesto Lundborg Hyperlipidemia LDL goal <100     Hypertension     Obesity (BMI 30-39. 9)     Severe persistent asthma without complication     intubated x 2013    Type 2 diabetes mellitus ([facility] [address] 2020    A1C 6.8        Past Surgical History:   Procedure Laterality Date     SECTION      x 3    CLEFT LIP REPAIR      OTHER SURGICAL HISTORY  13    Exploratory laparotomy with sigmoid colectomy, drainage pelvic abscess and formation end descending colostomy    OTHER SURGICAL HISTORY  3/20/14    Exploratory lapartomy with extensived lysis of adhesions, takedown of colostmy with primary stapled anastomosis, rigid sigmoidoscopy        Family History   Problem Relation Age of Onset    Heart Failure Mother         dec age   [facility] Diabetes Mother     Cancer Sister         pancreas, dec age 40        Social History     Socioeconomic History    Marital status:      Spouse name: Not on file    Number of children: 1    Years of education: Not on file    Highest education level: Not on file   Occupational History    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[employer]    Tobacco Use    Smoking status: Never Smoker    Smokeless tobacco: Never Used   Vaping Use    Vaping Use: Never used   Substance and Sexual Activity    Alcohol use: No     Alcohol/week: 0.0 standard drinks     Comment: social    Drug use: No    Sexual activity: Not on file   Other Topics Concern    Not on file   Social History Narrative    Born in [facility], one of 3     Dec 30, 2015 to a [place]    3 children on [facility]    Works in [address] [place] [address] Lives 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[address] [place] [address] 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 and grandson    Hobbies cooking, family, bowling     Social Determinants of Health     Financial Resource Strain: High Risk    Difficulty of Paying Living Expenses: Hard   Food Insecurity: Food Insecurity Present    Worried About Running Out of Food in the Last Year: Often true    Ran Out of Food in the Last Year: Often true   Transportation Needs: No Transportation Needs    Lack of Transportation (Medical): No    Lack of Transportation (Non-Medical): No   Physical Activity:     Days of Exercise per Week:     Minutes of Exercise per Session:    Stress:     Feeling of Stress :    Social Connections:     Frequency of Communication with Friends and Family:     Frequency of Social Gatherings with Friends and Family:     Attends Sabianism Services:     Active Member of Clubs or Organizations:     Attends Club or Organization Meetings:     Marital Status:    Intimate Partner Violence:     Fear of Current or Ex-Partner:     Emotionally Abused:     Physically Abused:     Sexually Abused:         /84   Pulse 89   Temp 97.4 °F (36.3 °C)   Resp 12   Ht 5' 5\" (1.651 m)   Wt 207 lb (93.9 kg)   SpO2 98%   BMI 34.45 kg/m²        Physical Exam:    General appearance - alert, well appearing, and in no distress, obese  Mental Status - alert, oriented to person, place, and time  Eyes - pupils equal and reactive, extraocular eye movements intact   Ears - bilateral TM's and external ear canals normal   Nose - normal and patent, no erythema, discharge or polyps   Sinuses - Normal paranasal sinuses without tenderness   Throat - mucous membranes moist, pharynx normal without lesions   Neck - supple, no significant adenopathy   Thyroid - thyroid is normal in size without nodules or tenderness    Chest -coarse breath sounds with expiratory wheezing bilaterally. No signs of focal consolidation.   Heart - normal rate, regular rhythm, normal S1, S2, no murmurs, rubs, clicks or gallops  Abdomen - soft, nontender, nondistended, no masses or organomegaly   Back exam - full range of motion, no tenderness, palpable spasm or pain on motion   Neurological - alert, oriented, normal speech, no focal findings or movement disorder noted   Musculoskeletal - no joint tenderness, deformity or swelling   Extremities - peripheral pulses normal, no pedal edema, no clubbing or cyanosis   Skin - normal coloration and turgor, no rashes, no suspicious skin lesions noted    Labs   No results found for: TSHREFLEX  TSH   Date Value Ref Range Status   09/18/2014 1.240 0.270 - 4.200 uIU/mL Final   06/18/2014 2.000 0.270 - 4.200 uIU/mL Final   05/14/2014 3.270 uIU/mL Final   12/27/2013 0.509 0.270 - 4.200 uIU/mL Final     Comment:     Effective 12/4/2013  Methodology and/or Reference Range has changed. 12/01/2013 1.511 0.550 - 4.780 uIU/mL Final   03/30/2013 1.634 0.550 - 4.780 uIU/mL Final   02/07/2013 2.275 0.550 - 4.780 uIU/mL Final   01/14/2013 0.627 0.550 - 4.780 uIU/mL Final   04/03/2012 2.059 0.550 - 4.780 uIU/mL Final     Lab Results   Component Value Date     07/29/2021    K 3.9 07/29/2021     07/29/2021    CO2 26 07/29/2021    BUN 10 07/29/2021    CREATININE 0.84 07/29/2021    GLUCOSE 105 (H) 07/29/2021    CALCIUM 9.7 07/29/2021    PROT 7.3 07/29/2021    LABALBU 4.3 07/29/2021    BILITOT 1.0 (H) 07/29/2021    ALKPHOS 98 07/29/2021    AST 18 07/29/2021    ALT 10 07/29/2021    LABGLOM >60.0 07/29/2021    GFRAA >60.0 07/29/2021    GLOB 3.0 07/29/2021       Lab Results   Component Value Date    LABA1C 5.9 07/29/2021     No results found for: EAG  Lab Results   Component Value Date    TSH 1.240 09/18/2014           A/P: Paddy sarah 54 y.o. female presenting for       1. Essential hypertension  Stable. - Comprehensive Metabolic Panel; Future    2. Colon cancer screening    - [id number] [place] [address] Sulema Lazaro    3. Hyperlipidemia, unspecified hyperlipidemia type      4.  Anxiety    - Drug Panel 9A Screen, Urine; Future  - ALPRAZolam (XANAX) 0.5 MG tablet; TAKE ONE TABLET BY MOUTH EVERY DAY AS NEEDED FOR ANXIETY  Dispense: 30 tablet; Refill: 2    5. Type 2 diabetes mellitus without complication, without long-term current use of insulin (HCC)    - Hemoglobin A1C; Future  - Lipid, Fasting; Future    6. Hypertension, unspecified type    - Comprehensive Metabolic Panel; Future    7. Moderate persistent asthma with exacerbation  Stable. 8. Iron deficiency anemia due to chronic blood loss  Lab Results   Component Value Date    WBC 7.6 03/22/2021    HGB 14.3 03/22/2021    HCT 42.8 03/22/2021    MCV 92.3 03/22/2021     03/22/2021         9. Gastroesophageal reflux disease without esophagitis      10. Class 1 obesity due to excess calories with serious comorbidity and body mass index (BMI) of 30.0 to 30.9 in adult    - phentermine (ADIPEX-P) 37.5 MG tablet; Take 1 tablet by mouth every morning (before breakfast) for 30 days. Dispense: 30 tablet; Refill: 0    11. Encounter for screening mammogram for malignant neoplasm of breast  Reprinted the mammogram     12.  Impacted cerumen, unspecified laterality    - carbamide peroxide (DEBROX) 6.5 % otic solution; Place 5 drops into both ears 2 times daily for 3 days  Dispense: 15 mL; Refill: 0

## 2021-07-29 NOTE — PATIENT INSTRUCTIONS
Patient Education        Learning About Foods That Are Good Sources of Fiber  What foods are high in fiber? The foods you eat contain nutrients, such as vitamins and minerals. Fiber is a nutrient. Your body needs the right amount to stay healthy and work as it should. You can use the list below to help you make choices about which foods to eat. Here are some examples of foods that are good sources of fiber. Fruits  · Apple  · Apricot  · Avocado  · Banana  · Blackberries  · Cherries  · Melon  · Pear  · Raspberries  Grains  · Amaranth  · Barley  · Bran cereal  · Farro  · Oat bran  · Oatmeal  · Quinoa  · Rice (brown or wild)  · Whole-grain bread  · Whole-grain English muffin  Protein foods  · Almonds  · Beans (black, kidney, navy, burrell)  · Elvin seeds  · Garbanzo beans  · Lentils  · Pumpkin seeds  · Split peas  · Sunflower seeds  Vegetables  · Artichoke  · Broccoli  · [place] sprouts  · Cabbage  · Carrots  · Cauliflower  · Eggplant  · Green peas  · Kale  · Pumpkin  · Sweet potato  · White potato  Work with your doctor to find out how much of this nutrient you need. Depending on your health, you may need more or less of it in your diet. Where can you learn more? Go to https://[employer]. org and sign in to your [employer] account. Enter F355 in the [facility] box to learn more about \"Learning About Foods That Are Good Sources of Fiber. \"     If you do not have an account, please click on the \"Sign Up Now\" link. Current as of: December 17, 2020               Content Version: 12.9  © 8003-5646 Healthwise, [employer] Care instructions adapted under license by [address] If you have questions about a medical condition or this instruction, always ask your healthcare professional. [address] any warranty or liability for your use of this information.

## 2021-07-31 LAB
ALCOHOL URINE: NEGATIVE MG/DL
AMPHETAMINE SCREEN, URINE: NEGATIVE NG/ML
BARBITURATE SCREEN URINE: NEGATIVE NG/ML
BENZODIAZEPINE SCREEN, URINE: NEGATIVE NG/ML
CANNABINOID SCREEN URINE: POSITIVE NG/ML
COCAINE METABOLITE SCREEN URINE: NEGATIVE NG/ML
CREATININE URINE: 54.4 MG/DL (ref 20–400)
Lab: NORMAL
MDMA URINE: NEGATIVE NG/ML
METHADONE SCREEN, URINE: NEGATIVE NG/ML
OPIATE SCREEN URINE: NEGATIVE NG/ML
OXYCODONE SCREEN URINE: NEGATIVE NG/ML
PHENCYCLIDINE SCREEN URINE: NEGATIVE NG/ML
PROPOXYPHENE SCREEN: NEGATIVE NG/ML

## 2021-08-02 ENCOUNTER — TELEPHONE (OUTPATIENT)
Dept: PULMONOLOGY | Age: 56
End: 2021-08-02

## 2021-08-02 NOTE — TELEPHONE ENCOUNTER
Pt called because she has heard nothing about her FASENRA injection. She is supposed to receive it by 8/4/2021 to keep on track.  Please advise, thanks

## 2021-08-06 NOTE — TELEPHONE ENCOUNTER
Pt called back asking about the [facility] injection. [facility] was due for it 8/4/2021. Please advise.  Thanks

## 2021-08-09 DIAGNOSIS — J45.50 SEVERE PERSISTENT ASTHMA WITHOUT COMPLICATION: Primary | ICD-10-CM

## 2021-08-19 ENCOUNTER — TELEPHONE (OUTPATIENT)
Dept: PULMONOLOGY | Age: 56
End: 2021-08-19

## 2021-08-19 RX ORDER — FLUTICASONE FUROATE, UMECLIDINIUM BROMIDE AND VILANTEROL TRIFENATATE 200; 62.5; 25 UG/1; UG/1; UG/1
1 POWDER RESPIRATORY (INHALATION) DAILY
Qty: 1 EACH | Refills: 3 | Status: SHIPPED
Start: 2021-08-19 | End: 2021-08-26 | Stop reason: ALTCHOICE

## 2021-08-19 NOTE — TELEPHONE ENCOUNTER
Spoke with the patient, I advised against prednisone in light of recent vaccination in order not to lose the efficacy of the vaccine. She is experiencing increased chest tightness, will change Advair and Spiriva to Trelegy Ellipta dose 200, also I will add Qvar twice daily.     Leatha could you please give the patient Trelegy Ellipta 200 she will stop by the office tomorrow

## 2021-08-19 NOTE — TELEPHONE ENCOUNTER
Pt called stating when she received the first dose of the Moderna shot on 8/7/2021, she began experiencing tightness of the chest and feels as if it aggravated her asthma. She consulted her pharmacist about this and he suggested she ask her PCP (Dr. Akash Ferguson) for a prescription for a steroid, before she receives the second dose, but her PCP refused to fill it, so, she is asking if Dr. Villa Krishnan will prescribe it. She is asking for Prednisone to be sent to [facility] in [place].  Please advise, thanks  PEDRO  5/12/2021  FU  8/26/2021

## 2021-08-26 ENCOUNTER — OFFICE VISIT (OUTPATIENT)
Dept: PULMONOLOGY | Age: 56
End: 2021-08-26
Payer: COMMERCIAL

## 2021-08-26 VITALS
WEIGHT: 201.8 LBS | TEMPERATURE: 97.6 F | HEART RATE: 68 BPM | DIASTOLIC BLOOD PRESSURE: 88 MMHG | RESPIRATION RATE: 16 BRPM | HEIGHT: 65 IN | OXYGEN SATURATION: 99 % | SYSTOLIC BLOOD PRESSURE: 144 MMHG | BODY MASS INDEX: 33.62 KG/M2

## 2021-08-26 DIAGNOSIS — R09.81 NASAL CONGESTION: ICD-10-CM

## 2021-08-26 DIAGNOSIS — K21.9 GASTROESOPHAGEAL REFLUX DISEASE WITHOUT ESOPHAGITIS: ICD-10-CM

## 2021-08-26 DIAGNOSIS — J45.50 SEVERE PERSISTENT ASTHMA WITHOUT COMPLICATION: ICD-10-CM

## 2021-08-26 DIAGNOSIS — R05.9 COUGH: Primary | ICD-10-CM

## 2021-08-26 DIAGNOSIS — E66.09 CLASS 1 OBESITY DUE TO EXCESS CALORIES WITHOUT SERIOUS COMORBIDITY WITH BODY MASS INDEX (BMI) OF 33.0 TO 33.9 IN ADULT: ICD-10-CM

## 2021-08-26 PROCEDURE — 99214 OFFICE O/P EST MOD 30 MIN: CPT | Performed by: INTERNAL MEDICINE

## 2021-08-26 RX ORDER — PANTOPRAZOLE SODIUM 20 MG/1
20 TABLET, DELAYED RELEASE ORAL DAILY
Qty: 30 TABLET | Refills: 3 | Status: SHIPPED | OUTPATIENT
Start: 2021-08-26

## 2021-08-26 NOTE — PROGRESS NOTES
Subjective:     Meme sarah is a 54 y.o. female who complains today of:     Chief Complaint   Patient presents with    Follow-up     3 Month F/U for Severe persistent asthma        HPI  Patient presents for asthma follow-up    She is doing better, however continues to have cough, worse when she lays flat, no chest pain, she denies clear symptoms of heartburn, no nasal congestion or postnasal drip, no fever or chills, no lower extremity edema, she is currently on Michele Blonder, she also on trilogy however she does not like the trilogy taste and it causes her to cough after the dose. Does not sleep well due to insomnia/anxiety, no nighttime wheezing or coughing. Allergies:  Bacitracin, Butalbital-aspirin-caffeine, Codeine, Floxin [ofloxacin], Rocephin [ceftriaxone sodium], Tomato, Asa [aspirin], Lisinopril, and Azithromycin  Past Medical History:   Diagnosis Date    Allergic rhinitis     Anxiety disorder     Cleft lip     Divergent strabismus     Diverticulitis 2013    s/p partial colectomy, Dr Magdi Davis Hyperlipidemia LDL goal <100     Hypertension     Obesity (BMI 30-39. 9)     Severe persistent asthma without complication     intubated x 2013    Type 2 diabetes mellitus ([facility] [address] 2020    A1C 6.8     Past Surgical History:   Procedure Laterality Date     SECTION      x 3    CLEFT LIP REPAIR      OTHER SURGICAL HISTORY  13    Exploratory laparotomy with sigmoid colectomy, drainage pelvic abscess and formation end descending colostomy    OTHER SURGICAL HISTORY  3/20/14    Exploratory lapartomy with extensived lysis of adhesions, takedown of colostmy with primary stapled anastomosis, rigid sigmoidoscopy     Family History   Problem Relation Age of Onset    Heart Failure Mother         dec age   [facility] Diabetes Mother     Cancer Sister         pancreas, dec age 40     Social History     Socioeconomic History    Marital status:      Spouse name: Not on file    Number of children: 1    Years of education: Not on file    Highest education level: Not on file   Occupational History    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 [address] 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[place] 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 and grandson    Hobbies cooking, family, bowling     Social Determinants of Health     Financial Resource Strain: High Risk    Difficulty of Paying Living Expenses: Hard   Food Insecurity: Food Insecurity Present    Worried About [address] [facility] in the Last Year: Often true   [address] in the Last Year: Often true   Transportation Needs: No Transportation Needs    Lack of Transportation (Medical): No    Lack of Transportation (Non-Medical):  No   Physical Activity:     Days of Exercise per Week:     Minutes of Exercise per Session:    Stress:     Feeling of Stress :    Social Connections:     Frequency of Communication with Friends and Family:     Frequency of Social Gatherings with Friends and Family:     Attends Yarsani Services:     Active Member of Clubs or Organizations:     Attends Club or Organization Meetings:     Marital Status:    Intimate Partner Violence:     Fear of Current or Ex-Partner:     Emotionally Abused:     Physically Abused:     Sexually Abused:          Review of Systems      ROS: 10 organs review of system is done including general, psychological, ENT, hematological, endocrine, respiratory, cardiovascular, gastrointestinal,musculoskeletal, neurological,  allergy and Immunology is done and is otherwise negative. Current Outpatient Medications   Medication Sig Dispense Refill    Benralizumab (FASENRA PEN SC) Inject into the skin      Budeson-Glycopyrrol-Formoterol 160-9-4.8 MCG/ACT AERO Inhale 2 puffs into the lungs 2 times daily 1 Inhaler 3    pantoprazole (PROTONIX) 20 MG tablet Take 1 tablet by mouth daily 30 tablet 3    phentermine (ADIPEX-P) 37.5 MG tablet Take 1 tablet by mouth every morning (before breakfast) for 30 days.  30 tablet 0    ALPRAZolam (XANAX) 0.5 MG tablet TAKE ONE TABLET BY MOUTH EVERY DAY AS NEEDED FOR ANXIETY 30 tablet 2    folic acid (FOLVITE) 596 MCG tablet TAKE ONE TABLET BY MOUTH EVERY DAY 30 tablet 2    azelastine (ASTELIN) 0.1 % nasal spray 1 spray by Nasal route 2 times daily Use in each nostril as directed 2 Bottle 1    polyethylene glycol (GLYCOLAX) 17 GM/SCOOP powder USE 17GMS AS DIRECTED ONCE DAILY AS NEEDED FOR CONSTIPATION 765 g 5    pravastatin (PRAVACHOL) 40 MG tablet Take 1 tablet by mouth daily 90 tablet 1    montelukast (SINGULAIR) 10 MG tablet TAKE 1 TABLET BY MOUTH EVERY NIGHT AT BEDTIME 30 tablet 3    loratadine (CLARITIN) 10 MG tablet Take 1 tablet by mouth daily 30 tablet 3    ondansetron (ZOFRAN) 4 MG tablet Take 1 tablet by mouth every 12 hours as needed for Nausea or Vomiting 30 tablet 3    albuterol (PROVENTIL) (2.5 MG/3ML) 0.083% nebulizer solution USE 1 VIAL VIA NEBULIZER EVERY 6 HOURS AS NEEDED 450 mL 3    albuterol sulfate  (90 Base) MCG/ACT inhaler Inhale 2 puffs into the lungs 4 times daily as needed for Wheezing 1 Inhaler 3    vitamin B-12 (CYANOCOBALAMIN) 100 MCG tablet Take 1 tablet by mouth daily 30 tablet 3    ferrous sulfate (FE TABS) 325 (65 Fe) MG EC tablet Take 1 tablet by mouth every morning (before breakfast) 30 tablet 3    Respiratory Therapy Supplies (NEBULIZER/TUBING/MOUTHPIECE) KIT 1 kit by Does not apply route daily as needed (use as directed) 1 kit 0    Spacer/Aero-Holding Chambers (E-Z SPACER) GASTON Us as directed for asthma 1 Device 0    benzonatate (TESSALON) 100 MG capsule TAKE ONE TO TWO CAPSULES BY MOUTH THREE TIMES A DAY AS NEEDED FOR COUGH 60 capsule 5    amLODIPine (NORVASC) 5 MG tablet Take 1 tablet by mouth daily 30 tablet 3    fluticasone (FLONASE) 50 MCG/ACT nasal spray 1 spray by Nasal route daily 2 Bottle 1    beclomethasone (QVAR) 40 MCG/ACT inhaler Inhale 2 puffs into the lungs 2 times daily (Patient not taking: Reported on 8/26/2021) 1 Inhaler 3     No current facility-administered medications for this visit. Objective:     Vitals:    08/26/21 1406 08/26/21 1417   BP: (!) 142/88 (!) 144/88   Site: Right Upper Arm Left Upper Arm   Position: Sitting Sitting   Cuff Size: Large Adult Medium Adult   Pulse: 68    Resp: 16    Temp: 97.6 °F (36.4 °C)    TempSrc: Tympanic    SpO2: 99%    Weight: 201 lb 12.8 oz (91.5 kg)    Height: 5' 5\" (1.651 m)          Physical Exam  Constitutional:       General: She is not in acute distress. Appearance: She is well-developed. She is not diaphoretic. HENT:      Head: Normocephalic and atraumatic. Eyes:      Conjunctiva/sclera: Conjunctivae normal.      Pupils: Pupils are equal, round, and reactive to light. Cardiovascular:      Rate and Rhythm: Normal rate and regular rhythm. Heart sounds: No murmur heard. No friction rub. No gallop. Pulmonary:      Effort: Pulmonary effort is normal. No respiratory distress. Breath sounds: Normal breath sounds. No wheezing or rales. Chest:      Chest wall: No tenderness. Abdominal:      General: There is no distension. Palpations: Abdomen is soft. Tenderness: There is no abdominal tenderness. There is no rebound. Musculoskeletal:         General: No tenderness. Cervical back: Normal range of motion and neck supple. Right lower leg: No edema. Left lower leg: No edema. Lymphadenopathy:      Cervical: No cervical adenopathy. Skin:     General: Skin is warm and dry. Findings: No erythema. Neurological:      Mental Status: She is alert and oriented to person, place, and time. Psychiatric:         Judgment: Judgment normal.         Imaging studies reviewed by me chest x-ray February 2021  Lab results reviewed in chart  PFT PFT February 2021, shows FEV1 57%, FEV1/FVC 0.57       Assessment and Plan       Diagnosis Orders   1. Cough  pantoprazole (PROTONIX) 20 MG tablet   2. Severe persistent asthma without complication  Budeson-Glycopyrrol-Formoterol 160-9-4.8 MCG/ACT AERO   3. Class 1 obesity due to excess calories without serious comorbidity with body mass index (BMI) of 33.0 to 33.9 in adult     4. Nasal congestion     5. Gastroesophageal reflux disease without esophagitis       · Cough, like related to underlying GERD, will change Pepcid to Protonix, and evaluate on follow-up  · Severe persistent asthma, patient is not comfortable with Trelegy Ellipta, will change to [facility], if not affordable/not covered by insurance will change to different combination LAMA LABA/ICS, yearly flu shot  · Nasal congestion, symptoms controlled, continue same  · GERD symptoms see #1  · CHARISMA      No orders of the defined types were placed in this encounter. Orders Placed This Encounter   Medications    Budeson-Glycopyrrol-Formoterol 160-9-4.8 MCG/ACT AERO     Sig: Inhale 2 puffs into the lungs 2 times daily     Dispense:  1 Inhaler     Refill:  3    pantoprazole (PROTONIX) 20 MG tablet     Sig: Take 1 tablet by mouth daily     Dispense:  30 tablet     Refill:  3            Discussed with patient the importance of exercise and weight control and  overall health and well-being. Reviewed with the patient: current clinical status, medications, activities and diet. Side effects, adverse effects of the medication prescribed today, as well as treatment plan and result expectations have been discussed with the patient who expresses understanding and desires to proceed.        Return in about 3 months (around 11/26/2021).       Carlos Miranda MD

## 2021-08-27 RX ORDER — BENRALIZUMAB 30 MG/ML
INJECTION, SOLUTION SUBCUTANEOUS
Qty: 0.28 ML | Status: CANCELLED | OUTPATIENT
Start: 2021-08-27

## 2021-10-08 ENCOUNTER — VIRTUAL VISIT (OUTPATIENT)
Dept: FAMILY MEDICINE CLINIC | Age: 56
End: 2021-10-08
Payer: COMMERCIAL

## 2021-10-08 DIAGNOSIS — J20.9 ACUTE BRONCHITIS, UNSPECIFIED ORGANISM: ICD-10-CM

## 2021-10-08 DIAGNOSIS — R05.9 COUGH: ICD-10-CM

## 2021-10-08 DIAGNOSIS — Z20.822 CLOSE EXPOSURE TO COVID-19 VIRUS: Primary | ICD-10-CM

## 2021-10-08 PROCEDURE — 99441 PR PHYS/QHP TELEPHONE EVALUATION 5-10 MIN: CPT | Performed by: FAMILY MEDICINE

## 2021-10-08 RX ORDER — DEXTROMETHORPHAN HYDROBROMIDE AND PROMETHAZINE HYDROCHLORIDE 15; 6.25 MG/5ML; MG/5ML
5 SYRUP ORAL 3 TIMES DAILY PRN
Qty: 105 ML | Refills: 0 | Status: SHIPPED | OUTPATIENT
Start: 2021-10-08 | End: 2021-10-15

## 2021-10-08 RX ORDER — GUAIFENESIN 600 MG/1
1200 TABLET, EXTENDED RELEASE ORAL 2 TIMES DAILY PRN
Qty: 40 TABLET | Refills: 0 | Status: SHIPPED | OUTPATIENT
Start: 2021-10-08 | End: 2021-10-18

## 2021-10-08 NOTE — PROGRESS NOTES
Patient reports that she is coughing and chest is tight   Patient was uing her inhaler and is seeing the pulmonologist   Reports that the medication for her asthma is working and started taking the injections   Admits to hot flashes   Reports when she lays back and sits back it is causing pain

## 2021-10-08 NOTE — LETTER
[facility]  [address] [address]  Phone: [phone]  Fax: [phone]    Flakito Colvin MD        October 8, 2021     Patient: Wesley sarah   YOB: 1965   Date of Visit: 10/8/2021       To Whom it May Concern:    Ca sarah was seen in my clinic on 10/8/2021 for a medical condition. Please excuse patient from work on 10/8/21. .She may return to work on 10/12/21. If you have any questions or concerns, please don't hesitate to call.     Sincerely,             Flakito Colvin MD

## 2021-10-10 LAB
SARS-COV-2: NOT DETECTED
SOURCE: NORMAL

## 2021-10-13 ENCOUNTER — VIRTUAL VISIT (OUTPATIENT)
Dept: FAMILY MEDICINE CLINIC | Age: 56
End: 2021-10-13
Payer: COMMERCIAL

## 2021-10-13 DIAGNOSIS — J20.9 ACUTE BRONCHITIS, UNSPECIFIED ORGANISM: Primary | ICD-10-CM

## 2021-10-13 PROCEDURE — 99441 PR PHYS/QHP TELEPHONE EVALUATION 5-10 MIN: CPT | Performed by: FAMILY MEDICINE

## 2021-10-13 RX ORDER — AMOXICILLIN 875 MG/1
875 TABLET, COATED ORAL 2 TIMES DAILY
Qty: 14 TABLET | Refills: 0 | Status: SHIPPED | OUTPATIENT
Start: 2021-10-13 | End: 2021-10-20

## 2021-10-13 ASSESSMENT — ENCOUNTER SYMPTOMS
EYE ITCHING: 0
COUGH: 1
CHEST TIGHTNESS: 1
EYE DISCHARGE: 0
ABDOMINAL PAIN: 0
ANAL BLEEDING: 0
VOMITING: 1
BACK PAIN: 0
CHEST TIGHTNESS: 1
NAUSEA: 0
EYE DISCHARGE: 0
SORE THROAT: 0
COUGH: 1
RHINORRHEA: 0
DIARRHEA: 1
RHINORRHEA: 0
EYE ITCHING: 0
NAUSEA: 0
ABDOMINAL PAIN: 0
BACK PAIN: 0
ANAL BLEEDING: 0
SORE THROAT: 1

## 2021-10-13 NOTE — PROGRESS NOTES
10/8/2021    TELEHEALTH EVALUATION -- Audio/Visual (During [id number] public health emergency)    Due to COVID 19 outbreak, patient's office visit was converted to a virtual visit. Patient was contacted and agreed to proceed with a virtual visit via Telephone Visit  The risks and benefits of converting to a virtual visit were discussed in light of the current infectious disease epidemic. Patient also understood that insurance coverage and co-pays are up to their individual insurance plans. HPI:    Bubba sarah (:  1965) has requested an audio/video evaluation for the following concern(s):    Covid exposure  Patient reports that she been coughing and has tightness in her chest.  Patient using her inhalers as prescribed by her pulmonologist.  Patient reports that her asthma medication is usually works but admits that she still has some hot flashes and when she lays down in her back it causes pain. Patient reports that she had a recent exposure to Covid and needed to be tested. Patient is vaccinated. Patient also had Covid in the past.      Review of Systems   Constitutional: Negative for activity change, appetite change, fatigue and fever. HENT: Negative for drooling, ear discharge, postnasal drip, rhinorrhea and sore throat. Eyes: Negative for discharge and itching. Respiratory: Positive for cough and chest tightness. Cardiovascular: Negative for palpitations and leg swelling. Gastrointestinal: Negative for abdominal pain, anal bleeding and nausea. Endocrine: Negative for cold intolerance and heat intolerance. Genitourinary: Negative for dyspareunia, dysuria and pelvic pain. Musculoskeletal: Negative for back pain. Allergic/Immunologic: Negative for environmental allergies and food allergies. Neurological: Negative for seizures and facial asymmetry. Hematological: Negative for adenopathy. Does not bruise/bleed easily.    Psychiatric/Behavioral: Negative for behavioral problems, dysphoric mood and hallucinations. Prior to Visit Medications    Medication Sig Taking?  Authorizing Provider   guaiFENesin (MUCINEX) 600 MG extended release tablet Take 2 tablets by mouth 2 times daily as needed for Congestion  Carroll Li MD   promethazine-dextromethorphan (PROMETHAZINE-DM) 6.25-15 MG/5ML syrup Take 5 mLs by mouth 3 times daily as needed for Cough  Carroll Li MD   Benralizumab (FASENRA PEN SC) Inject into the skin  Historical Provider, MD   Budeson-Glycopyrrol-Formoterol 160-9-4.8 MCG/ACT AERO Inhale 2 puffs into the lungs 2 times daily  Selvin Silva MD   pantoprazole (PROTONIX) 20 MG tablet Take 1 tablet by mouth daily  Selvin Silva MD   beclomethasone (QVAR) 40 MCG/ACT inhaler Inhale 2 puffs into the lungs 2 times daily  Patient not taking: Reported on 8/26/2021  Selvin Silva MD   ALPRAZolam (XANAX) 0.5 MG tablet TAKE ONE TABLET BY MOUTH EVERY DAY AS NEEDED FOR ANXIETY  Carroll Li MD   folic acid (FOLVITE) 822 MCG tablet TAKE ONE TABLET BY MOUTH EVERY DAY  Carroll Li MD   azelastine (ASTELIN) 0.1 % nasal spray 1 spray by Nasal route 2 times daily Use in each nostril as directed  Selvin Silva MD   polyethylene glycol (GLYCOLAX) 17 GM/SCOOP powder USE 17GMS AS DIRECTED ONCE DAILY AS NEEDED FOR CONSTIPATION  Toshia Galvan MD   pravastatin (PRAVACHOL) 40 MG tablet Take 1 tablet by mouth daily  Carroll Li MD   montelukast (SINGULAIR) 10 MG tablet TAKE 1 TABLET BY MOUTH EVERY NIGHT AT BEDTIME  Carroll Li MD   loratadine (CLARITIN) 10 MG tablet Take 1 tablet by mouth daily  Carroll Li MD   ondansetron (ZOFRAN) 4 MG tablet Take 1 tablet by mouth every 12 hours as needed for Nausea or Vomiting  Toshia Galvan MD   albuterol (PROVENTIL) (2.5 MG/3ML) 0.083% nebulizer solution USE 1 VIAL VIA NEBULIZER EVERY 6 HOURS AS NEEDED  Toshia Galvan MD   albuterol sulfate  (90 Base) MCG/ACT inhaler Inhale 2 puffs into the lungs 4 times daily as needed for Wheezing  Toshia Galvan MD   vitamin B-12 (CYANOCOBALAMIN) 100 MCG tablet Take 1 tablet by mouth daily  Toshia Galvan MD   ferrous sulfate (FE TABS) 325 (65 Fe) MG EC tablet Take 1 tablet by mouth every morning (before breakfast)  Becki Capps MD   Respiratory Therapy Supplies (NEBULIZER/TUBING/MOUTHPIECE) KIT 1 kit by Does not apply route daily as needed (use as directed)  Becki Capps MD   Spacer/Aero-Holding Chambers (E-Z SPACER) GASTON Us as directed for asthma  Toshia Galvan MD   benzonatate (TESSALON) 100 MG capsule TAKE ONE TO TWO CAPSULES BY MOUTH THREE TIMES A DAY AS NEEDED FOR COUGH  Becki Capps MD   amLODIPine (NORVASC) 5 MG tablet Take 1 tablet by mouth daily  Becki Capps MD   fluticasone (FLONASE) 50 MCG/ACT nasal spray 1 spray by Nasal route daily  Becki Capps MD       Social History     Tobacco Use    Smoking status: Never Smoker    Smokeless tobacco: Never Used   Vaping Use    Vaping Use: Never used   Substance Use Topics    Alcohol use: No     Alcohol/week: 0.0 standard drinks     Comment: social    Drug use: No        Allergies   Allergen Reactions    Bacitracin Anaphylaxis    Butalbital-Aspirin-Caffeine Anaphylaxis    Codeine Anaphylaxis    Floxin [Ofloxacin] Anaphylaxis    Rocephin [Ceftriaxone Sodium] Anaphylaxis    Tomato Hives, Shortness Of Breath and Itching    Asa [Aspirin]     Lisinopril Swelling     Lip swelling    Azithromycin Rash   ,   Past Medical History:   Diagnosis Date    Allergic rhinitis     Anxiety disorder     Cleft lip     Divergent strabismus     Diverticulitis 11/2013    s/p partial colectomy, Dr Olivia Camera    Hyperlipidemia LDL goal <100 2015    Hypertension     Obesity (BMI 30-39. 9)     Severe persistent asthma without complication     intubated x 1, 2013    Type 2 diabetes mellitus ([facility] [address] 07/24/2020    A1C 6.8   ,   Past Surgical History: Procedure Laterality Date     SECTION      x 3    CLEFT LIP REPAIR      OTHER SURGICAL HISTORY  13    Exploratory laparotomy with sigmoid colectomy, drainage pelvic abscess and formation end descending colostomy    OTHER SURGICAL HISTORY  3/20/14    Exploratory lapartomy with extensived lysis of adhesions, takedown of colostmy with primary stapled anastomosis, rigid sigmoidoscopy   ,   Social History     Tobacco Use    Smoking status: Never Smoker    Smokeless tobacco: Never Used   Vaping Use    Vaping Use: Never used   Substance Use Topics    Alcohol use: No     Alcohol/week: 0.0 standard drinks     Comment: social    Drug use: No   ,   Family History   Problem Relation Age of Onset    Heart Failure Mother         dec age   [facility] Diabetes Mother     Cancer Sister         pancreas, dec age 40   ,   Immunization History   Administered Date(s) Administered    COVID-19, Moderna, PF, 100mcg/0.5mL 2021, 2021    Hepatitis A 10/13/2015    Hepatitis B (Engerix-B) 10/13/2015    Influenza Virus Vaccine 10/13/2015    Pneumococcal Conjugate 7-valent (Levonia Roles) 2013   ,   Health Maintenance   Topic Date Due    Pneumococcal 0-64 years Vaccine (1 of 2 - PPSV23) 1971    Diabetic retinal exam  Never done    DTaP/Tdap/Td vaccine (1 - Tdap) Never done    Cervical cancer screen  Never done    Colon cancer screen colonoscopy  Never done    Hepatitis B vaccine (2 of 3 - Risk 3-dose series) 11/10/2015    Shingles Vaccine (1 of 2) Never done    Breast cancer screen  2017    Flu vaccine (1) 2021    Diabetic foot exam  2021    Diabetic microalbuminuria test  2021    A1C test (Diabetic or Prediabetic)  2022    Lipid screen  2022    COVID-19 Vaccine  Completed    Hepatitis C screen  Completed    HIV screen  Completed    Hepatitis A vaccine  Aged Out    Hib vaccine  Aged Out    Meningococcal (ACWY) vaccine  Aged Out       PHYSICAL EXAMINATION:  [ INSTRUCTIONS:  \"[x]\" Indicates a positive item  \"[]\" Indicates a negative item  -- DELETE ALL ITEMS NOT EXAMINED]  [x] Alert  [x] Oriented to person/place/time    [x] No apparent distress  [] Toxic appearing    [] Face flushed appearing [] Sclera clear  [] Lips are cyanotic      [] Breathing appears normal  [] Appears tachypneic      [] Rash on visible skin    [] Cranial Nerves II-XII grossly intact    [] Motor grossly intact in visible upper extremities    [] Motor grossly intact in visible lower extremities    [x] Normal Mood  [] Anxious appearing    [] Depressed appearing  [] Confused appearing      [] Poor short term memory  [] Poor long term memory    [] OTHER:      Due to this being a TeleHealth encounter, evaluation of the following organ systems is limited: Vitals/Constitutional/EENT/Resp/CV/GI//MS/Neuro/Skin/Heme-Lymph-Imm. ASSESSMENT/PLAN:  1. Close exposure to COVID-19 virus  Needs to be tested for COVID  Has mild symptoms no fevers to suggest flu. Will monitor and give patient results when finalized. No follow-ups on file. An  electronic signature was used to authenticate this note. --Jorge Luis Nelson MD on 10/13/2021 at 10:57 AM        Pursuant to the emergency declaration under the [facility] waiver authority and the [employer] Act, this Virtual  Visit was conducted, with patient's consent, to reduce the patient's risk of exposure to COVID-19 and provide continuity of care for an established patient. Services were provided through a video synchronous discussion virtually to substitute for in-person clinic visit. Linnea sarah is a 54 y.o. female evaluated via telephone on 10/8/2021.       Consent:  She and/or health care decision maker is aware that that she may receive a bill for this telephone service, depending on her insurance coverage, and has provided verbal consent to proceed: Yes      Documentation:  I communicated with the patient and/or health care decision maker about covid exposure. Details of this discussion including any medical advice provided: yes      I affirm this is a Patient Initiated Episode with a Patient who has not had a related appointment within my department in the past 7 days or scheduled within the next 24 hours. Patient identification was verified at the start of the visit: Yes    Total Time: minutes: 5-10 minutes    The visit was conducted pursuant to the emergency declaration under the [address] [place] [address], [address] [place] [address] authority and the [employer] [place] and [employer] Act. Patient identification was verified, and a caregiver was present when appropriate. The patient was located in a state where the provider was credentialed to provide care.     Note: not billable if this call serves to triage the patient into an appointment for the relevant concern      Loreta Rollins MD

## 2021-10-13 NOTE — PROGRESS NOTES
10/13/2021    TELEHEALTH EVALUATION -- Audio/Visual (During JGOAM-86 public health emergency)    Due to COVID 19 outbreak, patient's office visit was converted to a virtual visit. Patient was contacted and agreed to proceed with a virtual visit via Telephone Visit  The risks and benefits of converting to a virtual visit were discussed in light of the current infectious disease epidemic. Patient also understood that insurance coverage and co-pays are up to their individual insurance plans. HPI:    Lashaun Kal ginashruthi (:  1965) has requested an audio/video evaluation for the following concern(s):    Covid exposure  Patient reports that she been coughing and has tightness in her chest.  Patient using her inhalers as prescribed by her pulmonologist.  Patient reports that her asthma medication is usually works but admits that she still has some hot flashes and when she lays down in her back it causes pain. Patient reports that she had a recent exposure to Covid and needed to be tested. Patient is vaccinated. Patient also had Covid in the past.    F/u   Patient does not feel well   Has hot flashes and chills   Admits to sweats   Patinet reports that she needs more time for her to heal         Review of Systems   Constitutional: Negative for activity change, appetite change, fatigue and fever. HENT: Positive for sore throat. Negative for drooling, ear discharge, postnasal drip and rhinorrhea. Eyes: Negative for discharge and itching. Respiratory: Positive for cough and chest tightness. Cardiovascular: Negative for palpitations and leg swelling. Gastrointestinal: Positive for diarrhea and vomiting. Negative for abdominal pain, anal bleeding and nausea. Endocrine: Negative for cold intolerance and heat intolerance. Genitourinary: Negative for dyspareunia, dysuria and pelvic pain. Musculoskeletal: Negative for back pain.    Allergic/Immunologic: Negative for environmental allergies and food allergies. Neurological: Negative for seizures and facial asymmetry. Hematological: Negative for adenopathy. Does not bruise/bleed easily. Psychiatric/Behavioral: Negative for behavioral problems, dysphoric mood and hallucinations. Prior to Visit Medications    Medication Sig Taking?  Authorizing Provider   amoxicillin (AMOXIL) 875 MG tablet Take 1 tablet by mouth 2 times daily for 7 days Yes Rosita Zhang MD   guaiFENesin (MUCINEX) 600 MG extended release tablet Take 2 tablets by mouth 2 times daily as needed for Congestion  Toshia Galvan MD   promethazine-dextromethorphan (PROMETHAZINE-DM) 6.25-15 MG/5ML syrup Take 5 mLs by mouth 3 times daily as needed for Cough  Rosita Zhang MD   Benralizumab (FASENRA PEN SC) Inject into the skin  Historical Provider, MD Cook-Glycopyrrol-Formoterol 160-9-4.8 MCG/ACT AERO Inhale 2 puffs into the lungs 2 times daily  Kimberly Jacinto MD   pantoprazole (PROTONIX) 20 MG tablet Take 1 tablet by mouth daily  Kimberly Jacinto MD   beclomethasone (QVAR) 40 MCG/ACT inhaler Inhale 2 puffs into the lungs 2 times daily  Patient not taking: Reported on 8/26/2021  Kimberly Jacinto MD   ALPRAZolam (XANAX) 0.5 MG tablet TAKE ONE TABLET BY MOUTH EVERY DAY AS NEEDED FOR ANXIETY  Rosita Zhang MD   folic acid (FOLVITE) 457 MCG tablet TAKE ONE TABLET BY MOUTH EVERY DAY  Toshia Galvan MD   azelastine (ASTELIN) 0.1 % nasal spray 1 spray by Nasal route 2 times daily Use in each nostril as directed  Kimberly Jacinto MD   polyethylene glycol (GLYCOLAX) 17 GM/SCOOP powder USE 17GMS AS DIRECTED ONCE DAILY AS NEEDED FOR CONSTIPATION  Toshia Galvan MD   pravastatin (PRAVACHOL) 40 MG tablet Take 1 tablet by mouth daily  Toshia Galvan MD   montelukast (SINGULAIR) 10 MG tablet TAKE 1 TABLET BY MOUTH EVERY NIGHT AT BEDTIME  Toshia Galvan MD   loratadine (CLARITIN) 10 MG tablet Take 1 tablet by mouth daily  Rosita Zhang MD   ondansetron Diverticulitis 2013    s/p partial colectomy, Dr Gunn Larger Hyperlipidemia LDL goal <100     Hypertension     Obesity (BMI 30-39. 9)     Severe persistent asthma without complication     intubated x 2013    Type 2 diabetes mellitus ([facility] [address] 2020    A1C 6.8   ,   Past Surgical History:   Procedure Laterality Date     SECTION      x 3    CLEFT LIP REPAIR      OTHER SURGICAL HISTORY  13    Exploratory laparotomy with sigmoid colectomy, drainage pelvic abscess and formation end descending colostomy    OTHER SURGICAL HISTORY  3/20/14    Exploratory lapartomy with extensived lysis of adhesions, takedown of colostmy with primary stapled anastomosis, rigid sigmoidoscopy   ,   Social History     Tobacco Use    Smoking status: Never Smoker    Smokeless tobacco: Never Used   Vaping Use    Vaping Use: Never used   Substance Use Topics    Alcohol use: No     Alcohol/week: 0.0 standard drinks     Comment: social    Drug use: No   ,   Family History   Problem Relation Age of Onset    Heart Failure Mother         dec age   Gerard Hedger Diabetes Mother     Cancer Sister         pancreas, dec age 40   ,   Immunization History   Administered Date(s) Administered    COVID-19, Moderna, PF, 100mcg/0.5mL 2021, 2021    Hepatitis A 10/13/2015    Hepatitis B (Engerix-B) 10/13/2015    Influenza Virus Vaccine 10/13/2015    Pneumococcal Conjugate 7-valent (Bettyjo Simmons) 2013   ,   Health Maintenance   Topic Date Due    Pneumococcal 0-64 years Vaccine (1 of 2 - PPSV23) 1971    Diabetic retinal exam  Never done    DTaP/Tdap/Td vaccine (1 - Tdap) Never done    Cervical cancer screen  Never done    Colon cancer screen colonoscopy  Never done    Hepatitis B vaccine (2 of 3 - Risk 3-dose series) 11/10/2015    Shingles Vaccine (1 of 2) Never done    Breast cancer screen  2017    Flu vaccine (1) 2021    Diabetic foot exam  2021    Diabetic microalbuminuria test 11/11/2021    A1C test (Diabetic or Prediabetic)  07/29/2022    Lipid screen  07/29/2022    COVID-19 Vaccine  Completed    Hepatitis C screen  Completed    HIV screen  Completed    Hepatitis A vaccine  Aged Out    Hib vaccine  Aged Out    Meningococcal (ACWY) vaccine  Aged Out       PHYSICAL EXAMINATION:  [ INSTRUCTIONS:  \"[x]\" Indicates a positive item  \"[]\" Indicates a negative item  -- DELETE ALL ITEMS NOT EXAMINED]  [x] Alert  [x] Oriented to person/place/time    [x] No apparent distress  [] Toxic appearing    [] Face flushed appearing [] Sclera clear  [] Lips are cyanotic      [] Breathing appears normal  [] Appears tachypneic      [] Rash on visible skin    [] Cranial Nerves II-XII grossly intact    [] Motor grossly intact in visible upper extremities    [] Motor grossly intact in visible lower extremities    [x] Normal Mood  [] Anxious appearing    [] Depressed appearing  [] Confused appearing      [] Poor short term memory  [] Poor long term memory    [] OTHER:      Due to this being a TeleHealth encounter, evaluation of the following organ systems is limited: Vitals/Constitutional/EENT/Resp/CV/GI//MS/Neuro/Skin/Heme-Lymph-Imm. ASSESSMENT/PLAN:  1. Close exposure to COVID-19 virus  Negative COVID test but still not feeling well   Will get abx to help with her symptoms   Stalin can return to work on 10/15/21  Continue with supportive measures. No follow-ups on file. An  electronic signature was used to authenticate this note. --Missy Arango MD on 10/13/2021 at 11:46 AM        Pursuant to the emergency declaration under the [facility] waiver authority and the [employer] Act, this Virtual  Visit was conducted, with patient's consent, to reduce the patient's risk of exposure to COVID-19 and provide continuity of care for an established patient.     Services were provided through a video

## 2021-10-27 ENCOUNTER — TELEPHONE (OUTPATIENT)
Dept: FAMILY MEDICINE CLINIC | Age: 56
End: 2021-10-27

## 2021-10-27 NOTE — TELEPHONE ENCOUNTER
----- Message from Toma Reynoso sent at 10/26/2021  5:46 PM EDT -----  Subject: Message to Provider    QUESTIONS  Information for Provider? Client would like to know if her letter has been   sent out to [facility] , please call back   ---------------------------------------------------------------------------  --------------  [address]  What is the best way for the office to contact you? OK to leave message on   voicemail  Preferred Call Back Phone Number? [phone]  ---------------------------------------------------------------------------  --------------  SCRIPT ANSWERS  Relationship to Patient?  Self

## 2021-10-27 NOTE — TELEPHONE ENCOUNTER
Called and left a message letting Sherrill Ray know that we just received the [facility] form today. It is on Dr. Giorgio Estrada desk for her to sign and then will be faxed.

## 2022-01-21 ENCOUNTER — TELEPHONE (OUTPATIENT)
Dept: PULMONOLOGY | Age: 57
End: 2022-01-21

## 2022-01-21 NOTE — TELEPHONE ENCOUNTER
I tried doing patient's PA for her [facility] and her insurance isn't going through. I tried contacting patient and it says her number isn't a working number.  I am unable to do the PA for fasenra without new insurance and phone number

## 2022-02-07 DIAGNOSIS — F41.9 ANXIETY: Primary | ICD-10-CM

## 2022-02-07 DIAGNOSIS — I10 ESSENTIAL HYPERTENSION: ICD-10-CM

## 2022-02-07 DIAGNOSIS — I10 HYPERTENSION, UNSPECIFIED TYPE: ICD-10-CM

## 2022-02-07 NOTE — TELEPHONE ENCOUNTER
Recent Visits    10/13/2021 Acute bronchitis, unspecified organism   [facility] AT [facility] and JOJO Irby MD

## 2022-02-08 RX ORDER — ALPRAZOLAM 0.5 MG/1
0.5 TABLET ORAL NIGHTLY PRN
Qty: 30 TABLET | Refills: 0 | Status: SHIPPED | OUTPATIENT
Start: 2022-02-08 | End: 2022-06-24 | Stop reason: SDUPTHER

## 2022-02-08 RX ORDER — UBIDECARENONE 75 MG
CAPSULE ORAL
Qty: 30 TABLET | Refills: 0 | Status: SHIPPED | OUTPATIENT
Start: 2022-02-08

## 2022-02-08 RX ORDER — AMLODIPINE BESYLATE 5 MG/1
TABLET ORAL
Qty: 30 TABLET | Refills: 0 | Status: SHIPPED | OUTPATIENT
Start: 2022-02-08 | End: 2022-05-04

## 2022-02-08 RX ORDER — ALBUTEROL SULFATE 90 UG/1
AEROSOL, METERED RESPIRATORY (INHALATION)
Qty: 8.5 G | Refills: 2 | Status: SHIPPED | OUTPATIENT
Start: 2022-02-08 | End: 2022-08-22 | Stop reason: SDUPTHER

## 2022-03-09 ENCOUNTER — OFFICE VISIT (OUTPATIENT)
Dept: PULMONOLOGY | Age: 57
End: 2022-03-09
Payer: COMMERCIAL

## 2022-03-09 VITALS
DIASTOLIC BLOOD PRESSURE: 96 MMHG | HEIGHT: 65 IN | SYSTOLIC BLOOD PRESSURE: 158 MMHG | TEMPERATURE: 97.1 F | BODY MASS INDEX: 33.52 KG/M2 | OXYGEN SATURATION: 99 % | RESPIRATION RATE: 16 BRPM | HEART RATE: 99 BPM | WEIGHT: 201.2 LBS

## 2022-03-09 DIAGNOSIS — E66.09 CLASS 1 OBESITY DUE TO EXCESS CALORIES WITHOUT SERIOUS COMORBIDITY WITH BODY MASS INDEX (BMI) OF 33.0 TO 33.9 IN ADULT: ICD-10-CM

## 2022-03-09 DIAGNOSIS — K21.9 GASTROESOPHAGEAL REFLUX DISEASE WITHOUT ESOPHAGITIS: ICD-10-CM

## 2022-03-09 DIAGNOSIS — G47.30 SLEEP APNEA, UNSPECIFIED TYPE: ICD-10-CM

## 2022-03-09 DIAGNOSIS — J45.50 SEVERE PERSISTENT ASTHMA WITHOUT COMPLICATION: Primary | ICD-10-CM

## 2022-03-09 DIAGNOSIS — R09.81 NASAL CONGESTION: ICD-10-CM

## 2022-03-09 PROCEDURE — 99213 OFFICE O/P EST LOW 20 MIN: CPT | Performed by: INTERNAL MEDICINE

## 2022-03-09 NOTE — PROGRESS NOTES
Subjective:     Amie sarah is a 64 y.o. female who complains today of:     Chief Complaint   Patient presents with    Follow-up     7 Month F/U for Severe persistent asthma        HPI  Patient presents for asthma follow-up    She feels her symptoms are not controlled, no shortness of breath, she felt wheezing this morning and took breathing treatment before coming in, denies chest pain, no coughing, no fever no chills, no lower extremity edema, she works at night however during the day her sleep is fragmented, she is on [place] she believes she did not get her last dose 8 weeks ago adequately, she is due for the second dose today, she felt her symptoms became uncontrolled shortly after last dose which she believes most of it was not injected. She is currently on Breztri, she could not tolerate trilogy Ellipta in the past, heartburn is mild, currently on Protonix, no nasal congestion or postnasal drip, weight is stable, no skin rash or joint swelling. Allergies:  Bacitracin, Butalbital-aspirin-caffeine, Codeine, Floxin [ofloxacin], Rocephin [ceftriaxone sodium], Tomato, Asa [aspirin], Lisinopril, and Azithromycin  Past Medical History:   Diagnosis Date    Allergic rhinitis     Anxiety disorder     Cleft lip     Divergent strabismus     Diverticulitis 2013    s/p partial colectomy, Dr Mehran Tee Hyperlipidemia LDL goal <100     Hypertension     Obesity (BMI 30-39. 9)     Severe persistent asthma without complication     intubated x 2013    Type 2 diabetes mellitus ([facility] [address] 2020    A1C 6.8     Past Surgical History:   Procedure Laterality Date     SECTION      x 3    CLEFT LIP REPAIR      OTHER SURGICAL HISTORY  13    Exploratory laparotomy with sigmoid colectomy, drainage pelvic abscess and formation end descending colostomy    OTHER SURGICAL HISTORY  3/20/14    Exploratory lapartomy with extensived lysis of adhesions, takedown of colostmy with primary stapled anastomosis, rigid sigmoidoscopy     Family History   Problem Relation Age of Onset    Heart Failure Mother         dec age   Brunetta Nipper Diabetes Mother     Cancer Sister         pancreas, dec age 40     Social History     Socioeconomic History    Marital status:      Spouse name: Not on file    Number of children: 1    Years of education: Not on file    Highest education level: Not on file   Occupational History    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[employer]    Tobacco Use    Smoking status: Never Smoker    Smokeless tobacco: Never Used   Vaping Use    Vaping Use: Never used   Substance and Sexual Activity    Alcohol use: No     Alcohol/week: 0.0 standard drinks     Comment: social    Drug use: No    Sexual activity: Not on file   Other Topics Concern    Not on file   Social History Narrative    Born in [facility], one of 3     Dec 30, 2015 to a [place]    3 children on [facility]    Works in [employer] [address] 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[place] 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 and grandson    Hobbies cooking, family, bowling     Social Determinants of Health     Financial Resource Strain:     Difficulty of Paying Living Expenses: Not on [facility]:    [facility] Enrique Martin in the Last Year: Not on file    Real Jones in the Last Year: Not on file   Transportation Needs:     Lack of Transportation (Medical): Not on file    Lack of Transportation (Non-Medical):  Not on file   Physical Activity:     Days of Exercise per Week: Not on file    Minutes of Exercise per Session: Not on file   Stress:     Feeling of Stress : Not on file   Social Connections:     Frequency of Communication with Friends and Family: Not on file    Frequency of Social Gatherings with Friends and Family: Not on file    Attends Rastafari Services: Not on file    Active Member of Clubs or Organizations: Not on file    Attends Club or Organization Meetings: Not on file    Marital Status: Not on file   Intimate Partner Violence:     Fear of Current or Ex-Partner: Not on file    Emotionally Abused: Not on file    Physically Abused: Not on file    Sexually Abused: Not on file   Housing Stability:     Unable to Pay for Housing in the Last Year: Not on file    Number of [place] in the Last Year: Not on file    Unstable Housing in the Last Year: Not on file         Review of Systems      ROS: 10 organs review of system is done including general, psychological, ENT, hematological, endocrine, respiratory, cardiovascular, gastrointestinal,musculoskeletal, neurological,  allergy and Immunology is done and is otherwise negative. Current Outpatient Medications   Medication Sig Dispense Refill    Budeson-Glycopyrrol-Formoterol 160-9-4.8 MCG/ACT AERO Inhale 2 puffs into the lungs 2 times daily 1 each 3    amLODIPine (NORVASC) 5 MG tablet TAKE ONE TABLET BY MOUTH EVERY DAY 30 tablet 0    vitamin B-12 (CYANOCOBALAMIN) 100 MCG tablet TAKE ONE TABLET BY MOUTH EVERY DAY 30 tablet 0    ALPRAZolam (XANAX) 0.5 MG tablet Take 1 tablet by mouth nightly as needed for Sleep for up to 90 days.  30 tablet 0    albuterol sulfate  (90 Base) MCG/ACT inhaler INHALE TWO PUFFS BY MOUTH FOUR TIMES A DAY AS NEEDED FOR WHEEZING 8.5 g 2    Benralizumab (FASENRA PEN SC) Inject into the skin      pantoprazole (PROTONIX) 20 MG tablet Take 1 tablet by mouth daily 30 tablet 3    folic acid (FOLVITE) 974 MCG tablet TAKE ONE TABLET BY MOUTH EVERY DAY 30 tablet 2    azelastine (ASTELIN) 0.1 % nasal spray 1 spray by Nasal route 2 times daily Use in each nostril as directed 2 Bottle 1    polyethylene glycol (GLYCOLAX) 17 GM/SCOOP powder USE 17GMS AS DIRECTED ONCE DAILY AS NEEDED FOR CONSTIPATION 765 g 5    pravastatin (PRAVACHOL) 40 MG tablet Take 1 tablet by mouth daily 90 tablet 1    montelukast (SINGULAIR) 10 MG tablet TAKE 1 TABLET BY MOUTH EVERY NIGHT AT BEDTIME 30 tablet 3    loratadine (CLARITIN) 10 MG tablet Take 1 tablet by is no distension. Palpations: Abdomen is soft. Tenderness: There is no abdominal tenderness. There is no rebound. Musculoskeletal:         General: No tenderness. Cervical back: Normal range of motion and neck supple. Right lower leg: No edema. Left lower leg: No edema. Lymphadenopathy:      Cervical: No cervical adenopathy. Skin:     General: Skin is warm and dry. Findings: No erythema. Neurological:      Mental Status: She is alert and oriented to person, place, and time. Psychiatric:         Judgment: Judgment normal.         Imaging studies reviewed by me February 2021, no mass or infiltrate  Lab results reviewed in chart  PFT February 2021, FEV1 57%, FEV1/FVC 0.57       Assessment and Plan       Diagnosis Orders   1. Severe persistent asthma without complication  Budeson-Glycopyrrol-Formoterol 160-9-4.8 MCG/ACT AERO   2. Sleep apnea, unspecified type  Home Sleep Study   3. Class 1 obesity due to excess calories without serious comorbidity with body mass index (BMI) of 33.0 to 33.9 in adult     4. Gastroesophageal reflux disease without esophagitis     5. Nasal congestion     6. CHARISMA (obstructive sleep apnea)       · Asthma symptoms controlled, continue Breztri, as needed albuterol, and yearly flu shot.   · Possible CHARISMA, will obtain home sleep study  · Weight loss is recommended  · GERD symptoms controlled continue PPI  · Continue Flonase as needed      Orders Placed This Encounter   Procedures    Home Sleep Study     Standing Status:   Future     Standing Expiration Date:   9/9/2023     Order Specific Question:   Location For Sleep Study     Answer:   [facility]     Order Specific Question:   Select Sleep Lab Location     Answer:   [facility]     Orders Placed This Encounter   Medications    Budeson-Glycopyrrol-Formoterol 160-9-4.8 MCG/ACT AERO     Sig: Inhale 2 puffs into the lungs 2 times daily     Dispense:  1 each     Refill:  3            Discussed with patient the importance of exercise and weight control and  overall health and well-being. Reviewed with the patient: current clinical status, medications, activities and diet. Side effects, adverse effects of the medication prescribed today, as well as treatment plan and result expectations have been discussed with the patient who expresses understanding and desires to proceed. Return in about 6 weeks (around 4/20/2022).       Gerardo Angeles MD

## 2022-03-16 ENCOUNTER — TELEPHONE (OUTPATIENT)
Dept: PULMONOLOGY | Age: 57
End: 2022-03-16

## 2022-03-25 DIAGNOSIS — J45.50 SEVERE PERSISTENT ASTHMA WITHOUT COMPLICATION: Primary | ICD-10-CM

## 2022-03-25 RX ORDER — BENRALIZUMAB 30 MG/ML
1 INJECTION, SOLUTION SUBCUTANEOUS ONCE
Qty: 1 PEN | Refills: 3 | Status: SHIPPED | OUTPATIENT
Start: 2022-03-25 | End: 2022-03-25

## 2022-04-04 ENCOUNTER — HOSPITAL ENCOUNTER (OUTPATIENT)
Dept: SLEEP CENTER | Age: 57
Discharge: HOME | End: 2022-04-06
Payer: COMMERCIAL

## 2022-04-04 PROCEDURE — 95806 SLEEP STUDY UNATT&RESP EFFT: CPT

## 2022-04-15 DIAGNOSIS — G47.30 SLEEP APNEA, UNSPECIFIED TYPE: ICD-10-CM

## 2022-04-16 PROCEDURE — 95806 SLEEP STUDY UNATT&RESP EFFT: CPT | Performed by: INTERNAL MEDICINE

## 2022-05-03 DIAGNOSIS — E11.9 TYPE 2 DIABETES MELLITUS WITHOUT COMPLICATION, WITHOUT LONG-TERM CURRENT USE OF INSULIN (HCC): ICD-10-CM

## 2022-05-03 DIAGNOSIS — I10 ESSENTIAL HYPERTENSION: ICD-10-CM

## 2022-05-03 DIAGNOSIS — E78.5 HYPERLIPIDEMIA, UNSPECIFIED HYPERLIPIDEMIA TYPE: ICD-10-CM

## 2022-05-03 DIAGNOSIS — F41.9 ANXIETY: ICD-10-CM

## 2022-05-03 NOTE — TELEPHONE ENCOUNTER
Future Appointments    Encounter Information    Provider Department Appt Notes   5/5/2022 Lee Chacon MD [facility] Pulmonology 6WK FU       Past Visits    Date Provider Specialty Visit Type Primary Dx   03/09/2022 Lee Chacon MD Pulmonology Office Visit Severe persistent asthma without complication   39/62/[id number] Miguel Chow MD Family Medicine Virtual Visit Acute bronchitis, unspecified organism

## 2022-05-04 RX ORDER — ALPRAZOLAM 0.5 MG/1
0.5 TABLET ORAL NIGHTLY PRN
Qty: 30 TABLET | Refills: 0 | OUTPATIENT
Start: 2022-05-04 | End: 2022-06-03

## 2022-05-04 RX ORDER — AMLODIPINE BESYLATE 5 MG/1
TABLET ORAL
Qty: 90 TABLET | Refills: 0 | Status: SHIPPED | OUTPATIENT
Start: 2022-05-04

## 2022-05-04 RX ORDER — PRAVASTATIN SODIUM 40 MG
TABLET ORAL
Qty: 90 TABLET | Refills: 1 | Status: SHIPPED | OUTPATIENT
Start: 2022-05-04

## 2022-05-04 RX ORDER — LANOLIN ALCOHOL/MO/W.PET/CERES
CREAM (GRAM) TOPICAL
Qty: 90 TABLET | Refills: 1 | Status: SHIPPED | OUTPATIENT
Start: 2022-05-04

## 2022-05-07 DIAGNOSIS — I10 ESSENTIAL HYPERTENSION: ICD-10-CM

## 2022-05-07 DIAGNOSIS — E11.9 TYPE 2 DIABETES MELLITUS WITHOUT COMPLICATION, WITHOUT LONG-TERM CURRENT USE OF INSULIN (HCC): ICD-10-CM

## 2022-05-07 DIAGNOSIS — E78.5 HYPERLIPIDEMIA, UNSPECIFIED HYPERLIPIDEMIA TYPE: ICD-10-CM

## 2022-05-08 RX ORDER — LANOLIN ALCOHOL/MO/W.PET/CERES
CREAM (GRAM) TOPICAL
Qty: 30 TABLET | Refills: 3 | OUTPATIENT
Start: 2022-05-08

## 2022-05-08 RX ORDER — PRAVASTATIN SODIUM 40 MG
TABLET ORAL
Qty: 90 TABLET | Refills: 1 | OUTPATIENT
Start: 2022-05-08

## 2022-05-08 RX ORDER — AMLODIPINE BESYLATE 5 MG/1
TABLET ORAL
Qty: 30 TABLET | Refills: 0 | OUTPATIENT
Start: 2022-05-08

## 2022-05-24 ENCOUNTER — COMMUNITY OUTREACH (OUTPATIENT)
Dept: FAMILY MEDICINE CLINIC | Age: 57
End: 2022-05-24

## 2022-05-24 NOTE — PROGRESS NOTES
Patient's HM shows they are overdue for Mammogram and Colorectal Screening. [employer] and  files searched without success. No results to attach to order nor HM updated. lmom asking patient to call the office and schedule. [employer] message sent.

## 2022-06-01 ENCOUNTER — TELEPHONE (OUTPATIENT)
Dept: GASTROENTEROLOGY | Age: 57
End: 2022-06-01

## 2022-06-20 ENCOUNTER — TELEMEDICINE (OUTPATIENT)
Dept: PULMONOLOGY | Age: 57
End: 2022-06-20
Payer: COMMERCIAL

## 2022-06-20 DIAGNOSIS — J45.50 SEVERE PERSISTENT ASTHMA WITHOUT COMPLICATION: ICD-10-CM

## 2022-06-20 DIAGNOSIS — R09.81 NASAL CONGESTION: ICD-10-CM

## 2022-06-20 DIAGNOSIS — K21.9 GASTROESOPHAGEAL REFLUX DISEASE WITHOUT ESOPHAGITIS: ICD-10-CM

## 2022-06-20 DIAGNOSIS — E66.09 CLASS 1 OBESITY DUE TO EXCESS CALORIES WITHOUT SERIOUS COMORBIDITY WITH BODY MASS INDEX (BMI) OF 33.0 TO 33.9 IN ADULT: ICD-10-CM

## 2022-06-20 DIAGNOSIS — J30.2 SEASONAL ALLERGIES: ICD-10-CM

## 2022-06-20 DIAGNOSIS — J45.50 SEVERE PERSISTENT ASTHMA, UNSPECIFIED WHETHER COMPLICATED: Primary | ICD-10-CM

## 2022-06-20 PROCEDURE — 99443 PR PHYS/QHP TELEPHONE EVALUATION 21-30 MIN: CPT | Performed by: INTERNAL MEDICINE

## 2022-06-20 RX ORDER — FLUTICASONE PROPIONATE 50 MCG
1 SPRAY, SUSPENSION (ML) NASAL DAILY
Qty: 1 EACH | Refills: 3 | Status: SHIPPED | OUTPATIENT
Start: 2022-06-20

## 2022-06-20 RX ORDER — MONTELUKAST SODIUM 10 MG/1
TABLET ORAL
Qty: 30 TABLET | Refills: 3 | Status: SHIPPED | OUTPATIENT
Start: 2022-06-20

## 2022-06-20 RX ORDER — ALBUTEROL SULFATE 2.5 MG/3ML
SOLUTION RESPIRATORY (INHALATION)
Qty: 450 ML | Refills: 3 | Status: SHIPPED | OUTPATIENT
Start: 2022-06-20 | End: 2022-08-22 | Stop reason: SDUPTHER

## 2022-06-20 RX ORDER — PREDNISONE 10 MG/1
40 TABLET ORAL DAILY
Qty: 20 TABLET | Refills: 0 | Status: SHIPPED | OUTPATIENT
Start: 2022-06-20 | End: 2022-07-12 | Stop reason: SDUPTHER

## 2022-06-20 RX ORDER — AZELASTINE 1 MG/ML
1 SPRAY, METERED NASAL 2 TIMES DAILY
Qty: 1 EACH | Refills: 2 | Status: SHIPPED | OUTPATIENT
Start: 2022-06-20

## 2022-06-20 ASSESSMENT — ENCOUNTER SYMPTOMS
GASTROINTESTINAL NEGATIVE: 1
RESPIRATORY NEGATIVE: 1
EYES NEGATIVE: 1
ALLERGIC/IMMUNOLOGIC NEGATIVE: 1

## 2022-06-20 NOTE — PROGRESS NOTES
2022    TELEHEALTH EVALUATION -- Audio/Visual (During HOPYX-32 public health emergency)    Due to Lori 19 outbreak, patient's office visit was converted to a virtual visit. Patient was contacted and agreed to proceed with a virtual visit via Telephone Visit  The risks and benefits of converting to a virtual visit were discussed in light of the current infectious disease epidemic. Patient also understood that insurance coverage and co-pays are up to their individual insurance plans. HPI:    [facility]  1965[facility] has requested an audio/video evaluation for the following concern(s):    Asthma, Patient is doing okay, however symptoms not well controlled, she is certainly better now with current regimen of Breztri, Singulair, and Evelyn Punter. However at work she has to wear mask all the time which affecting her breathing, her asthma symptoms essentially occurs while at work, she uses albuterol inhaler prior to going to work and 1 time during the work time, while at home she does not have symptoms of coughing or wheezing, and able to sleep well. She has minimal heartburn and she takes Protonix, mild nasal congestion, and she is on Flonase, she did not start azelastine, no fever no chills, no lower extremity edema, her weight is stable. Review of Systems   Constitutional: Negative. HENT: Negative. Eyes: Negative. Respiratory: Negative. Gastrointestinal: Negative. Musculoskeletal: Negative. Allergic/Immunologic: Negative. Neurological: Negative. Hematological: Negative. Psychiatric/Behavioral: Negative. Prior to Visit Medications    Medication Sig Taking?  Authorizing Provider   predniSONE (DELTASONE) 10 MG tablet Take 4 tablets by mouth daily for 5 days Yes Isaías Crews MD   fluticasone (FLONASE) 50 MCG/ACT nasal spray 1 spray by Nasal route daily Yes Isaías Crews MD   azelastine (ASTELIN) 0.1 % nasal spray 1 spray by Nasal route 2 times daily Use in each nostril as directed Yes Tiffany Manley MD   montelukast (SINGULAIR) 10 MG tablet TAKE 1 TABLET BY MOUTH EVERY NIGHT AT BEDTIME Yes Tiffany Manley MD   albuterol (PROVENTIL) (2.5 MG/3ML) 0.083% nebulizer solution USE 1 VIAL VIA NEBULIZER EVERY 6 HOURS AS NEEDED Yes Tiffany Manley MD   folic acid (FOLVITE) 720 MCG tablet TAKE ONE TABLET BY MOUTH EVERY DAY  Toshia Galvan MD   amLODIPine (NORVASC) 5 MG tablet TAKE ONE TABLET BY MOUTH EVERY DAY  Toshia Galvan MD   pravastatin (PRAVACHOL) 40 MG tablet TAKE ONE TABLET BY MOUTH EVERY DAY  Toshia Galvan MD   Budeson-Glycopyrrol-Formoterol 160-9-4.8 MCG/ACT AERO Inhale 2 puffs into the lungs 2 times daily  Tiffany Manley MD   vitamin B-12 (CYANOCOBALAMIN) 100 MCG tablet TAKE ONE TABLET BY MOUTH EVERY DAY  Toshia Galvan MD   albuterol sulfate  (90 Base) MCG/ACT inhaler INHALE TWO PUFFS BY MOUTH FOUR TIMES A DAY AS NEEDED FOR WHEEZING  Toshia Galvan MD   pantoprazole (PROTONIX) 20 MG tablet Take 1 tablet by mouth daily  Tiffany Manley MD   polyethylene glycol (GLYCOLAX) 17 GM/SCOOP powder USE 17GMS AS DIRECTED ONCE DAILY AS NEEDED FOR CONSTIPATION  Toshia Galvan MD   loratadine (CLARITIN) 10 MG tablet Take 1 tablet by mouth daily  Kevin Zuleta MD   ondansetron (ZOFRAN) 4 MG tablet Take 1 tablet by mouth every 12 hours as needed for Nausea or Vomiting  Toshia Galvan MD   ferrous sulfate (FE TABS) 325 (65 Fe) MG EC tablet Take 1 tablet by mouth every morning (before breakfast)  Kevin Zuleta MD   Respiratory Therapy Supplies (NEBULIZER/TUBING/MOUTHPIECE) KIT 1 kit by Does not apply route daily as needed (use as directed)  Kevin Zuleta MD   Spacer/Aero-Holding Chambers (E-Z SPACER) GASTON Us as directed for asthma  Toshia Galvan MD   benzonatate (TESSALON) 100 MG capsule TAKE ONE TO TWO CAPSULES BY MOUTH THREE TIMES A DAY AS NEEDED FOR COUGH  Patient not taking: Reported on 3/9/2022  Toshia BYRNES MD Mandy       Social History     Tobacco Use    Smoking status: Never Smoker    Smokeless tobacco: Never Used   Vaping Use    Vaping Use: Never used   Substance Use Topics    Alcohol use: No     Alcohol/week: 0.0 standard drinks     Comment: social    Drug use: No        Allergies   Allergen Reactions    Bacitracin Anaphylaxis    Butalbital-Aspirin-Caffeine Anaphylaxis    Codeine Anaphylaxis    Floxin [Ofloxacin] Anaphylaxis    Rocephin [Ceftriaxone Sodium] Anaphylaxis    Tomato Hives, Shortness Of Breath and Itching    Asa [Aspirin]     Lisinopril Swelling     Lip swelling    Azithromycin Rash   ,   Past Medical History:   Diagnosis Date    Allergic rhinitis     Anxiety disorder     Cleft lip     Divergent strabismus     Diverticulitis 2013    s/p partial colectomy, Dr Townsend Must    Hyperlipidemia LDL goal <100     Hypertension     Obesity (BMI 30-39. 9)     Severe persistent asthma without complication     intubated x 2013    Type 2 diabetes mellitus ([facility] [address] 2020    A1C 6.8   ,   Past Surgical History:   Procedure Laterality Date     SECTION      x 3    CLEFT LIP REPAIR      OTHER SURGICAL HISTORY  13    Exploratory laparotomy with sigmoid colectomy, drainage pelvic abscess and formation end descending colostomy    OTHER SURGICAL HISTORY  3/20/14    Exploratory lapartomy with extensived lysis of adhesions, takedown of colostmy with primary stapled anastomosis, rigid sigmoidoscopy   ,   Social History     Tobacco Use    Smoking status: Never Smoker    Smokeless tobacco: Never Used   Vaping Use    Vaping Use: Never used   Substance Use Topics    Alcohol use: No     Alcohol/week: 0.0 standard drinks     Comment: social    Drug use: No   ,   Family History   Problem Relation Age of Onset    Heart Failure Mother         dec age   [place] Self Diabetes Mother     Cancer Sister         pancreas, dec age 40   ,   Immunization History   Administered Date(s) Administered   [place] Self Hepatitis A 10/13/2015    Hepatitis B (Engerix-B) 10/13/2015    Influenza Virus Vaccine 10/13/2015    Pneumococcal Conjugate 7-valent (Desiree Dapper) 12/17/2013   ,   Health Maintenance   Topic Date Due    COVID-19 Vaccine (1) Never done    Pneumococcal 0-64 years Vaccine (1 - PCV) 12/18/1971    Diabetic retinal exam  Never done    DTaP/Tdap/Td vaccine (1 - Tdap) Never done    Cervical cancer screen  Never done    Colorectal Cancer Screen  Never done    Hepatitis B vaccine (2 of 3 - Risk 3-dose series) 11/10/2015    Shingles vaccine (1 of 2) Never done    Breast cancer screen  11/21/2017    Diabetic foot exam  11/11/2021    Diabetic microalbuminuria test  11/11/2021    Depression Screen  02/10/2022    A1C test (Diabetic or Prediabetic)  07/29/2022    Lipids  07/29/2022    Flu vaccine (Season Ended) 09/01/2022    Hepatitis C screen  Completed    HIV screen  Completed    Hepatitis A vaccine  Aged Out    Hib vaccine  Aged Out    Meningococcal (ACWY) vaccine  Aged Out           PHYSICAL EXAMINATION:  [ INSTRUCTIONS:  \"[x]\" Indicates a positive item  \"[]\" Indicates a negative item  -- DELETE ALL ITEMS NOT EXAMINED]  [x] Alert  [] Oriented to person/place/time    [] No apparent distress  [] Toxic appearing    [] Face flushed appearing [] Sclera clear  [] Lips are cyanotic      [] Breathing appears normal  [] Appears tachypneic      [] No rash on visible skin    [] Cranial Nerves II-XII grossly intact    [] Motor grossly intact in visible upper extremities    [] Motor grossly intact in visible lower extremities    [] Normal Mood  [] Anxious appearing    [] Depressed appearing  [] Confused appearing      [] Poor short term memory  [] Poor long term memory    [] OTHER:      Due to this being a TeleHealth encounter, evaluation of the following organ systems is limited: Vitals/Constitutional/EENT/Resp/CV/GI//MS/Neuro/Skin/Heme-Lymph-Imm.       Imaging studies chest x-ray February 2021, is clear  Labs reviewed   PFT February 2021, FEV1 57%, FEV1/FVC 0.57  Home sleep study no CHARISMA    ASSESSMENT/PLAN:  1. Severe persistent asthma, unspecified whether complicated  · Not controlled, mainly due to persistent exposure, mainly due to work environment. At this point she is unable to change her work environment. · Continue Fasenra  · Continue Breztri and as needed albuterol  · Continue Singulair  · Prednisone 40 mg daily for 5 days to use as needed if worsening or impending eczema exacerbation  · She will call me if she ends up using prednisone. · Yearly flu shot  - predniSONE (DELTASONE) 10 MG tablet; Take 4 tablets by mouth daily for 5 days  Dispense: 20 tablet; Refill: 0  - montelukast (SINGULAIR) 10 MG tablet; TAKE 1 TABLET BY MOUTH EVERY NIGHT AT BEDTIME  Dispense: 30 tablet; Refill: 3  - albuterol (PROVENTIL) (2.5 MG/3ML) 0.083% nebulizer solution; USE 1 VIAL VIA NEBULIZER EVERY 6 HOURS AS NEEDED  Dispense: 450 mL; Refill: 3    2. Nasal congestion  Continue Flonase  Restart azelastine  - azelastine (ASTELIN) 0.1 % nasal spray; 1 spray by Nasal route 2 times daily Use in each nostril as directed  Dispense: 1 each; Refill: 2    3. Seasonal allergies  Same  - fluticasone (FLONASE) 50 MCG/ACT nasal spray; 1 spray by Nasal route daily  Dispense: 1 each; Refill: 3  - montelukast (SINGULAIR) 10 MG tablet; TAKE 1 TABLET BY MOUTH EVERY NIGHT AT BEDTIME  Dispense: 30 tablet; Refill: 3    4. Gastroesophageal reflux disease without esophagitis  Continue PPI    5. Class 1 obesity due to excess calories without serious comorbidity with body mass index (BMI) of 33.0 to 33.9 in adult  Weight loss is recommended      Return in about 2 months (around 8/20/2022). Total time 24 minutes  An  electronic signature was used to authenticate this note.     --Tiffany Manley MD on 6/20/2022 at 11:23 AM        Pursuant to the emergency declaration under the [facility], [address] [place] [address] and the Coronavirus Preparedness and Response Supplemental Appropriations Act, this Virtual  Visit was conducted, with patient's consent, to reduce the patient's risk of exposure to COVID-19 and provide continuity of care for an established patient. Services were provided through a video synchronous discussion virtually to substitute for in-person clinic visit.

## 2022-06-21 ENCOUNTER — TELEPHONE (OUTPATIENT)
Dept: PULMONOLOGY | Age: 57
End: 2022-06-21

## 2022-06-21 NOTE — TELEPHONE ENCOUNTER
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  Please Advise Fax [phone] ATT: [facility]

## 2022-06-24 ENCOUNTER — OFFICE VISIT (OUTPATIENT)
Dept: FAMILY MEDICINE CLINIC | Age: 57
End: 2022-06-24
Payer: COMMERCIAL

## 2022-06-24 VITALS
OXYGEN SATURATION: 98 % | WEIGHT: 192.4 LBS | BODY MASS INDEX: 32.06 KG/M2 | SYSTOLIC BLOOD PRESSURE: 134 MMHG | HEART RATE: 103 BPM | HEIGHT: 65 IN | DIASTOLIC BLOOD PRESSURE: 76 MMHG | TEMPERATURE: 97.6 F

## 2022-06-24 DIAGNOSIS — F41.9 ANXIETY: ICD-10-CM

## 2022-06-24 DIAGNOSIS — E11.9 TYPE 2 DIABETES MELLITUS WITHOUT COMPLICATION, WITHOUT LONG-TERM CURRENT USE OF INSULIN (HCC): ICD-10-CM

## 2022-06-24 DIAGNOSIS — R11.0 NAUSEA: ICD-10-CM

## 2022-06-24 DIAGNOSIS — E78.5 HYPERLIPIDEMIA, UNSPECIFIED HYPERLIPIDEMIA TYPE: ICD-10-CM

## 2022-06-24 DIAGNOSIS — Z12.11 COLON CANCER SCREENING: ICD-10-CM

## 2022-06-24 DIAGNOSIS — I10 ESSENTIAL HYPERTENSION: ICD-10-CM

## 2022-06-24 DIAGNOSIS — Z12.31 ENCOUNTER FOR SCREENING MAMMOGRAM FOR MALIGNANT NEOPLASM OF BREAST: Primary | ICD-10-CM

## 2022-06-24 LAB
ALBUMIN SERPL-MCNC: 4 G/DL (ref 3.5–4.6)
ALP BLD-CCNC: 86 U/L (ref 40–130)
ALT SERPL-CCNC: 12 U/L (ref 0–33)
ANION GAP SERPL CALCULATED.3IONS-SCNC: 13 MEQ/L (ref 9–15)
AST SERPL-CCNC: 19 U/L (ref 0–35)
BILIRUB SERPL-MCNC: 0.5 MG/DL (ref 0.2–0.7)
BUN BLDV-MCNC: 8 MG/DL (ref 6–20)
CALCIUM SERPL-MCNC: 9.2 MG/DL (ref 8.5–9.9)
CHLORIDE BLD-SCNC: 99 MEQ/L (ref 95–107)
CO2: 26 MEQ/L (ref 20–31)
CREAT SERPL-MCNC: 0.63 MG/DL (ref 0.5–0.9)
CREATININE URINE: 175.7 MG/DL
GFR AFRICAN AMERICAN: >60
GFR NON-AFRICAN AMERICAN: >60
GLOBULIN: 2.9 G/DL (ref 2.3–3.5)
GLUCOSE BLD-MCNC: 86 MG/DL (ref 70–99)
HBA1C MFR BLD: 5.8 % (ref 4.8–5.9)
MICROALBUMIN UR-MCNC: <1.2 MG/DL
MICROALBUMIN/CREAT UR-RTO: NORMAL MG/G (ref 0–30)
POTASSIUM SERPL-SCNC: 3.9 MEQ/L (ref 3.4–4.9)
SODIUM BLD-SCNC: 138 MEQ/L (ref 135–144)
TOTAL PROTEIN: 6.9 G/DL (ref 6.3–8)

## 2022-06-24 PROCEDURE — 99214 OFFICE O/P EST MOD 30 MIN: CPT | Performed by: FAMILY MEDICINE

## 2022-06-24 RX ORDER — ALPRAZOLAM 0.5 MG/1
0.5 TABLET ORAL NIGHTLY PRN
Qty: 90 TABLET | Refills: 1 | Status: SHIPPED | OUTPATIENT
Start: 2022-06-24 | End: 2022-12-21

## 2022-06-24 RX ORDER — ONDANSETRON 4 MG/1
4 TABLET, FILM COATED ORAL EVERY 12 HOURS PRN
Qty: 30 TABLET | Refills: 3 | Status: SHIPPED | OUTPATIENT
Start: 2022-06-24

## 2022-06-24 RX ORDER — BENRALIZUMAB 30 MG/ML
INJECTION, SOLUTION SUBCUTANEOUS
COMMUNITY
Start: 2022-05-23 | End: 2022-06-29 | Stop reason: SDUPTHER

## 2022-06-24 RX ORDER — NITROFURANTOIN 25; 75 MG/1; MG/1
100 CAPSULE ORAL 2 TIMES DAILY
Qty: 10 CAPSULE | Refills: 0 | Status: SHIPPED | OUTPATIENT
Start: 2022-06-24 | End: 2022-06-29

## 2022-06-24 SDOH — ECONOMIC STABILITY: FOOD INSECURITY: WITHIN THE PAST 12 MONTHS, THE FOOD YOU BOUGHT JUST DIDN'T LAST AND YOU DIDN'T HAVE MONEY TO GET MORE.: SOMETIMES TRUE

## 2022-06-24 SDOH — ECONOMIC STABILITY: FOOD INSECURITY: WITHIN THE PAST 12 MONTHS, YOU WORRIED THAT YOUR FOOD WOULD RUN OUT BEFORE YOU GOT MONEY TO BUY MORE.: SOMETIMES TRUE

## 2022-06-24 ASSESSMENT — PATIENT HEALTH QUESTIONNAIRE - PHQ9
SUM OF ALL RESPONSES TO PHQ QUESTIONS 1-9: 2
1. LITTLE INTEREST OR PLEASURE IN DOING THINGS: 1
SUM OF ALL RESPONSES TO PHQ QUESTIONS 1-9: 2
SUM OF ALL RESPONSES TO PHQ9 QUESTIONS 1 & 2: 2
2. FEELING DOWN, DEPRESSED OR HOPELESS: 1
SUM OF ALL RESPONSES TO PHQ QUESTIONS 1-9: 2
SUM OF ALL RESPONSES TO PHQ QUESTIONS 1-9: 2

## 2022-06-24 ASSESSMENT — SOCIAL DETERMINANTS OF HEALTH (SDOH): HOW HARD IS IT FOR YOU TO PAY FOR THE VERY BASICS LIKE FOOD, HOUSING, MEDICAL CARE, AND HEATING?: HARD

## 2022-06-24 NOTE — PROGRESS NOTES
Chief Complaint   Patient presents with    Medication Refill     Also wants something for congestion & acid reflux    Shoulder Pain     Pt c/o of right shoulder pain from work   [facility] Maintenance     Yes to mammogram. Denied colonoscopy & cologaurd. WIll do fitkit        HPI: [address] 64 y.o. female presenting for     Right shoulder pain   reports that it from using a heavy lift at work   reports that it is sore. Is improving. Urinary frequency   Dark with frequency    Severe Persistent Asthma   Patient is coming in with moderate persistent asthma. Patient reports that her symptoms do not feel the same as her typical asthma flares. Admits to a cough and wheezing. Denies any fevers. Patient works at the [employer] where several worker are out sick. Her daughter is also sick (she works on the [employer]). Both and daughter were tested for COVID on Monday and are still waiting for the results. F/u  Stalin has followed up with the pulmonary. Stalin was taking Advair and Singulair for her asthma. Spiriva was added to her list. reports that she has been doing better with the Spiriva. Admits that it is expensive but has new insurance. F/u   Patient is currently on Spiriva, Advair, Singulair. Patient is taking the fasenra which is helping a lot. Patient denies any issues or concerns on the medication. Is seeing pulmonary     Obesity   Admits to weight gain. Was on steroids frequently that caused increase weight gain. Patient did admit to decreasing her exercises when she was sick but now has picked it back up. F/u   Is losing weight   Trying to exercise and watch what she is eating.      HLD   Lab Results   Component Value Date    CHOL 240 (H) 11/27/2017    CHOL 244 (H) 01/24/2017    CHOL 259 (H) 11/21/2015     Lab Results   Component Value Date    TRIG 61 11/27/2017    TRIG 93 01/24/2017    TRIG 97 11/21/2015     Lab Results   Component Value Date    HDL 56 07/29/2021    HDL 64 (H) 02/10/2021    HDL 55 12/13/2019     Lab Results   Component Value Date    LDLCALC 188 (H) 07/29/2021    LDLCALC 166 (H) 02/10/2021    LDLCALC 147 (H) 12/13/2019     No results found for: LABVLDL, VLDL  Lab Results   Component Value Date    CHOLHDLRATIO 4.3 02/07/2013    CHOLHDLRATIO 3.9 01/14/2013    CHOLHDLRATIO 4.0 04/03/2012     Stalin takes pravastatin daily  Trying to work on diet     HTN  BP Readings from Last 20 Encounters:   06/24/22 134/76   03/09/22 (!) 158/96   08/26/21 (!) 144/88   07/29/21 132/84   05/12/21 130/84   03/26/21 (!) 146/106   02/10/21 120/88   01/14/21 132/84   11/11/20 (!) 138/90   07/28/20 120/82   07/24/20 130/82   07/22/20 (!) 148/103   04/09/20 120/86   12/13/19 130/82   06/04/19 (!) 140/90   05/17/19 (!) 140/100   11/13/18 (!) 148/98   09/17/18 (!) 142/100   08/19/18 (!) 131/94   06/28/18 120/80   ]  Adhering to a low sodium diet   Compliant with 5 mg of amlodipine.            Current Outpatient Medications   Medication Sig Dispense Refill    predniSONE (DELTASONE) 10 MG tablet Take 4 tablets by mouth daily for 5 days 20 tablet 0    fluticasone (FLONASE) 50 MCG/ACT nasal spray 1 spray by Nasal route daily 1 each 3    azelastine (ASTELIN) 0.1 % nasal spray 1 spray by Nasal route 2 times daily Use in each nostril as directed 1 each 2    montelukast (SINGULAIR) 10 MG tablet TAKE 1 TABLET BY MOUTH EVERY NIGHT AT BEDTIME 30 tablet 3    albuterol (PROVENTIL) (2.5 MG/3ML) 0.083% nebulizer solution USE 1 VIAL VIA NEBULIZER EVERY 6 HOURS AS NEEDED 450 mL 3    Budeson-Glycopyrrol-Formoterol 160-9-4.8 MCG/ACT AERO Inhale 2 puffs into the lungs 2 times daily 1 each 3    folic acid (FOLVITE) 322 MCG tablet TAKE ONE TABLET BY MOUTH EVERY DAY 90 tablet 1    amLODIPine (NORVASC) 5 MG tablet TAKE ONE TABLET BY MOUTH EVERY DAY 90 tablet 0    pravastatin (PRAVACHOL) 40 MG tablet TAKE ONE TABLET BY MOUTH EVERY DAY 90 tablet 1    vitamin B-12 (CYANOCOBALAMIN) 100 MCG tablet TAKE ONE TABLET BY MOUTH EVERY DAY 30 tablet 0    albuterol sulfate  (90 Base) MCG/ACT inhaler INHALE TWO PUFFS BY MOUTH FOUR TIMES A DAY AS NEEDED FOR WHEEZING 8.5 g 2    pantoprazole (PROTONIX) 20 MG tablet Take 1 tablet by mouth daily 30 tablet 3    polyethylene glycol (GLYCOLAX) 17 GM/SCOOP powder USE 17GMS AS DIRECTED ONCE DAILY AS NEEDED FOR CONSTIPATION 765 g 5    loratadine (CLARITIN) 10 MG tablet Take 1 tablet by mouth daily 30 tablet 3    ondansetron (ZOFRAN) 4 MG tablet Take 1 tablet by mouth every 12 hours as needed for Nausea or Vomiting 30 tablet 3    ferrous sulfate (FE TABS) 325 (65 Fe) MG EC tablet Take 1 tablet by mouth every morning (before breakfast) 30 tablet 3    Respiratory Therapy Supplies (NEBULIZER/TUBING/MOUTHPIECE) KIT 1 kit by Does not apply route daily as needed (use as directed) 1 kit 0    Spacer/Aero-Holding Chambers (E-Z SPACER) GASTON Us as directed for asthma 1 Device 0    benzonatate (TESSALON) 100 MG capsule TAKE ONE TO TWO CAPSULES BY MOUTH THREE TIMES A DAY AS NEEDED FOR COUGH (Patient not taking: Reported on 3/9/2022) 60 capsule 5     No current facility-administered medications for this visit. ROS  CONSTITUTIONAL: The patient denies fevers, chills, sweats and body ache. Admits to weight loss   HEENT: Denies headache, blurry vision, eye pain, tinnitus, vertigo,  sore throat, neck or thyroid masses. RESPIRATORY: reports cough, sputum, wheezing, denies hemoptysis. CARDIAC: Denies chest pain, pressure, palpitations, Denies lower extremity edema. GASTROINTESTINAL: Denies abdominal pain, denies constipation, denies diarrhea, bleeding in the stools,   GENITOURINARY: Denies dysuria, hematuria, reports dysuria, and foul odor, denies urinary incontinence. NEUROLOGIC: Denies headaches, dizziness, syncope, weakness  MUSCULOSKELETAL: denies changes in range of motion, joint pain, stiffness. ENDOCRINOLOGY: Denies heat or cold intolerance.    HEMATOLOGY: Denies easy bleeding or blood transfusion, reports anemia  DERMATOLOGY: Denies changes in moles or pigmentation changes. PSYCHIATRY: Denies depression, agitation, reports anxiety. Past Medical History:   Diagnosis Date    Allergic rhinitis     Anxiety disorder     Cleft lip     Divergent strabismus     Diverticulitis 2013    s/p partial colectomy, Dr Modesto Lundborg Hyperlipidemia LDL goal <100     Hypertension     Obesity (BMI 30-39. 9)     Severe persistent asthma without complication     intubated x 2013    Type 2 diabetes mellitus ([facility] [address] 2020    A1C 6.8        Past Surgical History:   Procedure Laterality Date     SECTION      x 3    CLEFT LIP REPAIR      OTHER SURGICAL HISTORY  13    Exploratory laparotomy with sigmoid colectomy, drainage pelvic abscess and formation end descending colostomy    OTHER SURGICAL HISTORY  3/20/14    Exploratory lapartomy with extensived lysis of adhesions, takedown of colostmy with primary stapled anastomosis, rigid sigmoidoscopy        Family History   Problem Relation Age of Onset    Heart Failure Mother         dec age   [facility] Diabetes Mother     Cancer Sister         pancreas, dec age 40        Social History     Socioeconomic History    Marital status:      Spouse name: Not on file    Number of children: 1    Years of education: Not on file    Highest education level: Not on file   Occupational History    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[employer]    Tobacco Use    Smoking status: Never Smoker    Smokeless tobacco: Never Used   Vaping Use    Vaping Use: Never used   Substance and Sexual Activity    Alcohol use: No     Alcohol/week: 0.0 standard drinks     Comment: social    Drug use: No    Sexual activity: Not on file   Other Topics Concern    Not on file   Social History Narrative    Born in [facility], one of 3     Dec 30, 2015 to a [place]    3 children on [facility]    Works in [address] [employer] [address] 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[place] 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 and grandson    Hobbies cooking, family, bowling     Social Determinants of Health     Financial Resource Strain: High Risk    Difficulty of Paying Living Expenses: Hard   Food Insecurity: Food Insecurity Present    Worried About Running Out of Food in the Last Year: Sometimes true    Naheed of Food in the Last Year: Sometimes true   Transportation Needs:     Lack of Transportation (Medical): Not on file    Lack of Transportation (Non-Medical):  Not on file   Physical Activity:     Days of Exercise per Week: Not on file    Minutes of Exercise per Session: Not on file   Stress:     Feeling of Stress : Not on file   Social Connections:     Frequency of Communication with Friends and Family: Not on file    Frequency of Social Gatherings with Friends and Family: Not on file    Attends Yarsani Services: Not on file    Active Member of [address] [place] [address] [employer] or Organizations: Not on file    Attends Club or Organization Meetings: Not on file    Marital Status: Not on file   Intimate Partner Violence:     Fear of Current or Ex-Partner: Not on file    Emotionally Abused: Not on file    Physically Abused: Not on file    Sexually Abused: Not on file   Housing Stability:     Unable to Pay for Housing in the Last Year: Not on file    Number of [place] in the Last Year: Not on file    Unstable Housing in the Last Year: Not on file        /76   Pulse (!) 103 Comment: Pt reports mask is causing her heartrate to rise  Temp 97.6 °F (36.4 °C) (Temporal)   Ht 5' 5\" (1.651 m)   Wt 192 lb 6.4 oz (87.3 kg)   SpO2 98%   BMI 32.02 kg/m²        Physical Exam:    General appearance - alert, well appearing, and in no distress, obese  Mental Status - alert, oriented to person, place, and time  Eyes - pupils equal and reactive, extraocular eye movements intact   Ears - bilateral TM's and external ear canals normal   Nose - normal and patent, no erythema, discharge or polyps   Sinuses - Normal paranasal sinuses without tenderness   Throat - mucous membranes moist, pharynx normal without lesions   Neck - supple, no significant adenopathy   Thyroid - thyroid is normal in size without nodules or tenderness    Chest -coarse breath sounds with expiratory wheezing bilaterally. No signs of focal consolidation. Heart - normal rate, regular rhythm, normal S1, S2, no murmurs, rubs, clicks or gallops  Abdomen - soft, nontender, nondistended, no masses or organomegaly   Back exam - full range of motion, no tenderness, palpable spasm or pain on motion   Neurological - alert, oriented, normal speech, no focal findings or movement disorder noted   Musculoskeletal - no joint tenderness, deformity or swelling   Extremities - peripheral pulses normal, no pedal edema, no clubbing or cyanosis   Skin - normal coloration and turgor, no rashes, no suspicious skin lesions noted    Labs   No results found for: TSHREFLEX  TSH   Date Value Ref Range Status   09/18/2014 1.240 0.270 - 4.200 uIU/mL Final   06/18/2014 2.000 0.270 - 4.200 uIU/mL Final   05/14/2014 3.270 uIU/mL Final   12/27/2013 0.509 0.270 - 4.200 uIU/mL Final     Comment:     Effective 12/4/2013  Methodology and/or Reference Range has changed.    12/01/2013 1.511 0.550 - 4.780 uIU/mL Final   03/30/2013 1.634 0.550 - 4.780 uIU/mL Final   02/07/2013 2.275 0.550 - 4.780 uIU/mL Final   01/14/2013 0.627 0.550 - 4.780 uIU/mL Final   04/03/2012 2.059 0.550 - 4.780 uIU/mL Final     Lab Results   Component Value Date     07/29/2021    K 3.9 07/29/2021     07/29/2021    CO2 26 07/29/2021    BUN 10 07/29/2021    CREATININE 0.84 07/29/2021    GLUCOSE 105 (H) 07/29/2021    CALCIUM 9.7 07/29/2021    PROT 7.3 07/29/2021    LABALBU 4.3 07/29/2021    BILITOT 1.0 (H) 07/29/2021    ALKPHOS 98 07/29/2021    AST 18 07/29/2021    ALT 10 07/29/2021    LABGLOM >60.0 07/29/2021    GFRAA >60.0 07/29/2021    GLOB 3.0 07/29/2021       Lab Results   Component Value Date    LABA1C 5.9 07/29/2021     No results found for: EAG  Lab Results   Component Value Date    TSH 1.240 09/18/2014           A/P: Nicky Doing 64 y.o. female presenting for       1. Anxiety  OARRS reviewed. Will refill.   - ALPRAZolam (XANAX) 0.5 MG tablet; Take 1 tablet by mouth nightly as needed for Sleep or Anxiety for up to 180 days. Dispense: 90 tablet; Refill: 1    2. Nausea    - ondansetron (ZOFRAN) 4 MG tablet; Take 1 tablet by mouth every 12 hours as needed for Nausea or Vomiting  Dispense: 30 tablet; Refill: 3    3. Encounter for screening mammogram for malignant neoplasm of breast    - SERINA DIGITAL SCREEN W OR WO CAD BILATERAL; Future    4. Colon cancer screening    - POCT Fecal Immunochemical Test (Fit); Future    5. Type 2 diabetes mellitus without complication, without long-term current use of insulin (HCC)  Lab Results   Component Value Date    LABA1C 5.9 07/29/2021     No results found for: EAG    - Microalbumin / Creatinine Urine Ratio; Future  - Hemoglobin A1C; Future  - Comprehensive Metabolic Panel; Future    6. Hyperlipidemia, unspecified hyperlipidemia type  Lab Results   Component Value Date    CHOL 240 (H) 11/27/2017    CHOL 244 (H) 01/24/2017    CHOL 259 (H) 11/21/2015     Lab Results   Component Value Date    TRIG 61 11/27/2017    TRIG 93 01/24/2017    TRIG 97 11/21/2015     Lab Results   Component Value Date    HDL 56 07/29/2021    HDL 64 (H) 02/10/2021    HDL 55 12/13/2019     Lab Results   Component Value Date    LDLCALC 188 (H) 07/29/2021    LDLCALC 166 (H) 02/10/2021    LDLCALC 147 (H) 12/13/2019     No results found for: LABVLDL, VLDL  Lab Results   Component Value Date    CHOLHDLRATIO 4.3 02/07/2013    CHOLHDLRATIO 3.9 01/14/2013    CHOLHDLRATIO 4.0 04/03/2012         7. Essential hypertension  Stable.

## 2022-06-24 NOTE — LETTER
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      Sincerely,             Kanchan Zhang MD

## 2022-06-29 ENCOUNTER — TELEPHONE (OUTPATIENT)
Dept: PULMONOLOGY | Age: 57
End: 2022-06-29

## 2022-06-29 DIAGNOSIS — J45.50 SEVERE PERSISTENT ASTHMA, UNSPECIFIED WHETHER COMPLICATED: Primary | ICD-10-CM

## 2022-06-29 RX ORDER — BENRALIZUMAB 30 MG/ML
30 INJECTION, SOLUTION SUBCUTANEOUS ONCE
Qty: 1 PEN | Refills: 5 | Status: SHIPPED | OUTPATIENT
Start: 2022-06-29 | End: 2022-06-29

## 2022-06-29 NOTE — TELEPHONE ENCOUNTER
TRAVIS CALLED IN TO THE OFFICE TO ADVISE PTS FASENRA HAS BEEN DENIED. YOU CAN CALL [phone] TO APPEAL THIS.        [phone] IS THE PHARMACY NUMBER IF NEEDED     PLEASE ADVISE

## 2022-07-07 DIAGNOSIS — J45.50 SEVERE PERSISTENT ASTHMA, UNSPECIFIED WHETHER COMPLICATED: ICD-10-CM

## 2022-07-07 NOTE — TELEPHONE ENCOUNTER
Rx requested:  Requested Prescriptions     Pending Prescriptions Disp Refills    predniSONE (DELTASONE) 10 MG tablet 20 tablet 0     Sig: Take 4 tablets by mouth daily for 5 days       Last Office Visit:   6/20/2022      Next Visit Date:  Future Appointments   Date Time Provider Gela Pearl   8/22/2022  2:30 PM Necia Cheadle, MD [address]   12/19/2022  9:45 AM Mukund Delong MD [address] [place] [address] 7

## 2022-07-12 RX ORDER — PREDNISONE 10 MG/1
TABLET ORAL
Qty: 20 TABLET | Refills: 0 | OUTPATIENT
Start: 2022-07-12

## 2022-07-12 RX ORDER — PREDNISONE 10 MG/1
40 TABLET ORAL DAILY
Qty: 20 TABLET | Refills: 0 | Status: SHIPPED | OUTPATIENT
Start: 2022-07-12 | End: 2022-08-30 | Stop reason: SDUPTHER

## 2022-08-08 ENCOUNTER — COMMUNITY OUTREACH (OUTPATIENT)
Dept: FAMILY MEDICINE CLINIC | Age: 57
End: 2022-08-08

## 2022-08-08 NOTE — PROGRESS NOTES
Patient's HM shows they are overdue for Colorectal Screening. Care Everywhere and  files searched. No results to attach to order nor HM updated. 09-10-21 colonoscopy cancelled, cologuard ord.  On 02-11-21

## 2022-08-16 ENCOUNTER — COMMUNITY OUTREACH (OUTPATIENT)
Dept: FAMILY MEDICINE CLINIC | Age: 57
End: 2022-08-16

## 2022-08-16 NOTE — PROGRESS NOTES
Patient's HM shows they are overdue for Mammogram Screening. [employer] and  files searched. No results to attach to order nor HM updated.

## 2022-08-22 ENCOUNTER — TELEPHONE (OUTPATIENT)
Dept: PULMONOLOGY | Age: 57
End: 2022-08-22

## 2022-08-22 ENCOUNTER — OFFICE VISIT (OUTPATIENT)
Dept: PULMONOLOGY | Age: 57
End: 2022-08-22
Payer: COMMERCIAL

## 2022-08-22 VITALS
TEMPERATURE: 97 F | OXYGEN SATURATION: 99 % | BODY MASS INDEX: 31.72 KG/M2 | WEIGHT: 190.4 LBS | SYSTOLIC BLOOD PRESSURE: 152 MMHG | HEIGHT: 65 IN | HEART RATE: 92 BPM | DIASTOLIC BLOOD PRESSURE: 90 MMHG | RESPIRATION RATE: 16 BRPM

## 2022-08-22 DIAGNOSIS — E66.09 CLASS 1 OBESITY DUE TO EXCESS CALORIES WITHOUT SERIOUS COMORBIDITY WITH BODY MASS INDEX (BMI) OF 33.0 TO 33.9 IN ADULT: ICD-10-CM

## 2022-08-22 DIAGNOSIS — K21.9 GASTROESOPHAGEAL REFLUX DISEASE WITHOUT ESOPHAGITIS: ICD-10-CM

## 2022-08-22 DIAGNOSIS — J45.50 SEVERE PERSISTENT ASTHMA, UNSPECIFIED WHETHER COMPLICATED: Primary | ICD-10-CM

## 2022-08-22 DIAGNOSIS — R09.81 NASAL CONGESTION: ICD-10-CM

## 2022-08-22 PROCEDURE — 99213 OFFICE O/P EST LOW 20 MIN: CPT | Performed by: INTERNAL MEDICINE

## 2022-08-22 RX ORDER — ALBUTEROL SULFATE 2.5 MG/3ML
SOLUTION RESPIRATORY (INHALATION)
Qty: 450 ML | Refills: 3 | Status: SHIPPED | OUTPATIENT
Start: 2022-08-22

## 2022-08-22 RX ORDER — ALBUTEROL SULFATE 90 UG/1
AEROSOL, METERED RESPIRATORY (INHALATION)
Qty: 8.5 G | Refills: 2 | Status: SHIPPED | OUTPATIENT
Start: 2022-08-22

## 2022-08-22 NOTE — TELEPHONE ENCOUNTER
LVM for patient stating for her to call Enrique regarding her Jose Luis Mack. She needs to contact Satish Miller regarding re-signing up for the co-pay assistance program before they are able to ship anything.

## 2022-08-22 NOTE — PROGRESS NOTES
Subjective:     Luci De Paz is a 64 y.o. female who complains today of:     Chief Complaint   Patient presents with    Follow-up     2 Month F/U for Severe persistent asthma       HPI  Patient presents for   asthma follow-up     She is having problem getting Grecia Mccarthy from her specialty pharmacy, however prior authorization was done through our office and she is approved. She still gets episodes of shortness of breath, she responds well to Grecia Mccarthy however she has not been able to take it regularly due to insurance issue. Currently medication is approved, she is compliant with her inhalers, no chest pain, no fever no chills, no nasal congestion or postnasal drip and no heartburn. Allergies:  Bacitracin, Butalbital-aspirin-caffeine, Codeine, Floxin [ofloxacin], Rocephin [ceftriaxone sodium], Tomato, Asa [aspirin], Lisinopril, and Azithromycin  Past Medical History:   Diagnosis Date    Allergic rhinitis     Anxiety disorder     Cleft lip     Divergent strabismus     Diverticulitis 2013    s/p partial colectomy, Dr Janna Bailey    Hyperlipidemia LDL goal <100     Hypertension     Obesity (BMI 30-39. 9)     Severe persistent asthma without complication     intubated x 2013    Type 2 diabetes mellitus ([facility] [address] 2020    A1C 6.8     Past Surgical History:   Procedure Laterality Date     SECTION      x 3    CLEFT LIP REPAIR      OTHER SURGICAL HISTORY  13    Exploratory laparotomy with sigmoid colectomy, drainage pelvic abscess and formation end descending colostomy    OTHER SURGICAL HISTORY  3/20/14    Exploratory lapartomy with extensived lysis of adhesions, takedown of colostmy with primary stapled anastomosis, rigid sigmoidoscopy     Family History   Problem Relation Age of Onset    Heart Failure Mother         dec age    Diabetes Mother     Cancer Sister         pancreas, dec age 40     Social History     Socioeconomic History    Marital status:      Spouse name: Not on file ondansetron (ZOFRAN) 4 MG tablet Take 1 tablet by mouth every 12 hours as needed for Nausea or Vomiting 30 tablet 3    fluticasone (FLONASE) 50 MCG/ACT nasal spray 1 spray by Nasal route daily 1 each 3    azelastine (ASTELIN) 0.1 % nasal spray 1 spray by Nasal route 2 times daily Use in each nostril as directed 1 each 2    montelukast (SINGULAIR) 10 MG tablet TAKE 1 TABLET BY MOUTH EVERY NIGHT AT BEDTIME 30 tablet 3    Budeson-Glycopyrrol-Formoterol 160-9-4.8 MCG/ACT AERO Inhale 2 puffs into the lungs 2 times daily 1 each 3    folic acid (FOLVITE) 938 MCG tablet TAKE ONE TABLET BY MOUTH EVERY DAY 90 tablet 1    amLODIPine (NORVASC) 5 MG tablet TAKE ONE TABLET BY MOUTH EVERY DAY 90 tablet 0    pravastatin (PRAVACHOL) 40 MG tablet TAKE ONE TABLET BY MOUTH EVERY DAY 90 tablet 1    vitamin B-12 (CYANOCOBALAMIN) 100 MCG tablet TAKE ONE TABLET BY MOUTH EVERY DAY 30 tablet 0    pantoprazole (PROTONIX) 20 MG tablet Take 1 tablet by mouth daily 30 tablet 3    polyethylene glycol (GLYCOLAX) 17 GM/SCOOP powder USE 17GMS AS DIRECTED ONCE DAILY AS NEEDED FOR CONSTIPATION 765 g 5    loratadine (CLARITIN) 10 MG tablet Take 1 tablet by mouth daily 30 tablet 3    ferrous sulfate (FE TABS) 325 (65 Fe) MG EC tablet Take 1 tablet by mouth every morning (before breakfast) 30 tablet 3    Respiratory Therapy Supplies (NEBULIZER/TUBING/MOUTHPIECE) KIT 1 kit by Does not apply route daily as needed (use as directed) 1 kit 0    Spacer/Aero-Holding Chambers (E-Z SPACER) GASTON Us as directed for asthma 1 Device 0    benzonatate (TESSALON) 100 MG capsule TAKE ONE TO TWO CAPSULES BY MOUTH THREE TIMES A DAY AS NEEDED FOR COUGH (Patient not taking: No sig reported) 60 capsule 5     No current facility-administered medications for this visit.        Objective:     Vitals:    08/22/22 1424 08/22/22 1444   BP: (!) 150/90 (!) 152/90   Site: Right Upper Arm Left Upper Arm   Position: Sitting Sitting   Cuff Size: Medium Adult Medium Adult   Pulse: 92 Resp: 16    Temp: 97 °F (36.1 °C)    TempSrc: Infrared    SpO2: 99%    Weight: 190 lb 6.4 oz (86.4 kg)    Height: 5' 5\" (1.651 m)          Physical Exam  Constitutional:       General: She is not in acute distress. Appearance: She is well-developed. She is not diaphoretic. HENT:      Head: Normocephalic and atraumatic. Eyes:      Conjunctiva/sclera: Conjunctivae normal.      Pupils: Pupils are equal, round, and reactive to light. Cardiovascular:      Rate and Rhythm: Normal rate and regular rhythm. Heart sounds: No murmur heard. No friction rub. No gallop. Pulmonary:      Effort: Pulmonary effort is normal. No respiratory distress. Breath sounds: Normal breath sounds. No wheezing or rales. Chest:      Chest wall: No tenderness. Abdominal:      General: There is no distension. Palpations: Abdomen is soft. Tenderness: There is no abdominal tenderness. There is no rebound. Musculoskeletal:         General: No tenderness. Cervical back: Normal range of motion and neck supple. Right lower leg: No edema. Left lower leg: No edema. Lymphadenopathy:      Cervical: No cervical adenopathy. Skin:     General: Skin is warm and dry. Findings: No erythema. Neurological:      Mental Status: She is alert and oriented to person, place, and time. Psychiatric:         Judgment: Judgment normal.       Imaging studies reviewed by me chest x-ray January 2021 is unremarkable  Lab results reviewed in chart  PFT February 2021, FEV1 57%, FEV1/FVC 0.57  Home sleep study is negative    Assessment and Plan       Diagnosis Orders   1. Severe persistent asthma, unspecified whether complicated  albuterol (PROVENTIL) (2.5 MG/3ML) 0.083% nebulizer solution      2. Nasal congestion        3.  Class 1 obesity due to excess calories without serious comorbidity with body mass index (BMI) of 33.0 to 33.9 in adult          Asthma, symptoms better controlled, continue Jose Luis Mack, will give her samples until insurance/pharmacy issue resolves,  Continue Breztri and Singulair  Continue as needed albuterol  Avoid triggers as much as possible  Patient will contact her primary provider regarding uncontrolled hypertension  Weight loss is recommended  Continue Flonase  Continue PPI      No orders of the defined types were placed in this encounter. Orders Placed This Encounter   Medications    albuterol (PROVENTIL) (2.5 MG/3ML) 0.083% nebulizer solution     Sig: USE 1 VIAL VIA NEBULIZER EVERY 6 HOURS AS NEEDED     Dispense:  450 mL     Refill:  3    albuterol sulfate HFA (PROVENTIL;VENTOLIN;PROAIR) 108 (90 Base) MCG/ACT inhaler     Sig: INHALE TWO PUFFS BY MOUTH FOUR TIMES A DAY AS NEEDED FOR WHEEZING     Dispense:  8.5 g     Refill:  2     Will fill. NEEDS AN APPOINTMENT FOR FUTURE REFILLS>            Discussed with patient the importance of exercise and weight control and  overall health and well-being. Reviewed with the patient: current clinical status, medications, activities and diet. Side effects, adverse effects of the medication prescribed today, as well as treatment plan and result expectations have been discussed with the patient who expresses understanding and desires to proceed. Return in about 6 weeks (around 10/3/2022).       Misa Don MD

## 2022-08-23 ENCOUNTER — TELEPHONE (OUTPATIENT)
Dept: GASTROENTEROLOGY | Age: 57
End: 2022-08-23

## 2022-08-26 DIAGNOSIS — J45.50 SEVERE PERSISTENT ASTHMA, UNSPECIFIED WHETHER COMPLICATED: ICD-10-CM

## 2022-08-26 NOTE — TELEPHONE ENCOUNTER
Rx requested:  Requested Prescriptions     Pending Prescriptions Disp Refills    benzonatate (TESSALON) 100 MG capsule 60 capsule 5     Sig: TAKE ONE TO TWO CAPSULES BY MOUTH THREE TIMES A DAY AS NEEDED FOR COUGH    predniSONE (DELTASONE) 10 MG tablet 20 tablet 0     Sig: Take 4 tablets by mouth daily for 5 days       Last Office Visit:   8/22/2022      Next Visit Date:  Future Appointments   Date Time Provider Gela Ryanne   10/5/2022 10:00 AM Chanda Murray [address]   12/19/2022  9:45 AM Yissel Syed  [place]

## 2022-08-29 RX ORDER — BENZONATATE 100 MG/1
CAPSULE ORAL
Qty: 60 CAPSULE | Refills: 5 | OUTPATIENT
Start: 2022-08-29

## 2022-08-29 RX ORDER — PREDNISONE 10 MG/1
40 TABLET ORAL DAILY
Qty: 20 TABLET | Refills: 0 | OUTPATIENT
Start: 2022-08-29 | End: 2022-09-03

## 2022-08-30 ENCOUNTER — TELEPHONE (OUTPATIENT)
Dept: FAMILY MEDICINE CLINIC | Age: 57
End: 2022-08-30

## 2022-08-30 DIAGNOSIS — J45.50 SEVERE PERSISTENT ASTHMA, UNSPECIFIED WHETHER COMPLICATED: ICD-10-CM

## 2022-08-30 RX ORDER — BENZONATATE 100 MG/1
200 CAPSULE ORAL 3 TIMES DAILY PRN
Qty: 42 CAPSULE | Refills: 0 | Status: SHIPPED | OUTPATIENT
Start: 2022-08-30 | End: 2022-09-06

## 2022-08-30 RX ORDER — PREDNISONE 10 MG/1
40 TABLET ORAL DAILY
Qty: 20 TABLET | Refills: 0 | Status: SHIPPED | OUTPATIENT
Start: 2022-08-30 | End: 2022-09-04

## 2022-08-30 RX ORDER — PREDNISONE 10 MG/1
TABLET ORAL
Qty: 20 TABLET | Refills: 0 | OUTPATIENT
Start: 2022-08-30

## 2022-08-31 RX ORDER — BENZONATATE 100 MG/1
CAPSULE ORAL
Qty: 60 CAPSULE | Refills: 5 | OUTPATIENT
Start: 2022-08-31

## 2022-08-31 NOTE — TELEPHONE ENCOUNTER
Requested Prescriptions     Pending Prescriptions Disp Refills    benzonatate (TESSALON) 100 MG capsule [Pharmacy Med Name: BENZONATATE 100MG CAPS] 60 capsule 5     Sig: TAKE ONE TO TWO CAPSULES BY MOUTH THREE TIMES A DAY AS NEEDED FOR COUGH

## 2022-11-01 ENCOUNTER — APPOINTMENT (OUTPATIENT)
Dept: GENERAL RADIOLOGY | Age: 57
End: 2022-11-01
Payer: COMMERCIAL

## 2022-11-01 ENCOUNTER — NURSE TRIAGE (OUTPATIENT)
Dept: OTHER | Facility: CLINIC | Age: 57
End: 2022-11-01

## 2022-11-01 ENCOUNTER — HOSPITAL ENCOUNTER (EMERGENCY)
Age: 57
Discharge: HOME | End: 2022-11-01
Payer: COMMERCIAL

## 2022-11-01 VITALS
WEIGHT: 184.5 LBS | DIASTOLIC BLOOD PRESSURE: 93 MMHG | OXYGEN SATURATION: 96 % | TEMPERATURE: 98.4 F | BODY MASS INDEX: 30.74 KG/M2 | RESPIRATION RATE: 21 BRPM | HEIGHT: 65 IN | HEART RATE: 90 BPM | SYSTOLIC BLOOD PRESSURE: 151 MMHG

## 2022-11-01 DIAGNOSIS — J45.21 MILD INTERMITTENT ASTHMA WITH EXACERBATION: Primary | ICD-10-CM

## 2022-11-01 LAB
ALBUMIN SERPL-MCNC: 4 G/DL (ref 3.5–4.6)
ALP BLD-CCNC: 81 U/L (ref 40–130)
ALT SERPL-CCNC: 14 U/L (ref 0–33)
ANION GAP SERPL CALCULATED.3IONS-SCNC: 11 MEQ/L (ref 9–15)
AST SERPL-CCNC: 19 U/L (ref 0–35)
BASOPHILS ABSOLUTE: 0.1 K/UL (ref 0–0.2)
BASOPHILS RELATIVE PERCENT: 0.9 %
BILIRUB SERPL-MCNC: 1 MG/DL (ref 0.2–0.7)
BUN BLDV-MCNC: 13 MG/DL (ref 6–20)
CALCIUM SERPL-MCNC: 9.4 MG/DL (ref 8.5–9.9)
CHLORIDE BLD-SCNC: 102 MEQ/L (ref 95–107)
CO2: 25 MEQ/L (ref 20–31)
CREAT SERPL-MCNC: 0.76 MG/DL (ref 0.5–0.9)
EOSINOPHILS ABSOLUTE: 0 K/UL (ref 0–0.7)
EOSINOPHILS RELATIVE PERCENT: 0.1 %
GFR SERPL CREATININE-BSD FRML MDRD: >60 ML/MIN/{1.73_M2}
GLOBULIN: 3.1 G/DL (ref 2.3–3.5)
GLUCOSE BLD-MCNC: 92 MG/DL (ref 70–99)
HCT VFR BLD CALC: 43.2 % (ref 37–47)
HEMOGLOBIN: 14.4 G/DL (ref 12–16)
INFLUENZA A BY PCR: NEGATIVE
INFLUENZA B BY PCR: NEGATIVE
LYMPHOCYTES ABSOLUTE: 1.2 K/UL (ref 1–4.8)
LYMPHOCYTES RELATIVE PERCENT: 20.3 %
MCH RBC QN AUTO: 30.4 PG (ref 27–31.3)
MCHC RBC AUTO-ENTMCNC: 33.3 % (ref 33–37)
MCV RBC AUTO: 91.5 FL (ref 79.4–94.8)
MONOCYTES ABSOLUTE: 0.4 K/UL (ref 0.2–0.8)
MONOCYTES RELATIVE PERCENT: 6.2 %
NEUTROPHILS ABSOLUTE: 4.4 K/UL (ref 1.4–6.5)
NEUTROPHILS RELATIVE PERCENT: 72.5 %
PDW BLD-RTO: 14.3 % (ref 11.5–14.5)
PLATELET # BLD: 312 K/UL (ref 130–400)
POTASSIUM SERPL-SCNC: 4 MEQ/L (ref 3.4–4.9)
RBC # BLD: 4.72 M/UL (ref 4.2–5.4)
SODIUM BLD-SCNC: 138 MEQ/L (ref 135–144)
TOTAL PROTEIN: 7.1 G/DL (ref 6.3–8)
TROPONIN: <0.01 NG/ML (ref 0–0.01)
WBC # BLD: 6.1 K/UL (ref 4.8–10.8)

## 2022-11-01 PROCEDURE — 2700000000 HC OXYGEN THERAPY PER DAY

## 2022-11-01 PROCEDURE — 84484 ASSAY OF TROPONIN QUANT: CPT

## 2022-11-01 PROCEDURE — 96374 THER/PROPH/DIAG INJ IV PUSH: CPT

## 2022-11-01 PROCEDURE — 6360000002 HC RX W HCPCS: Performed by: NURSE PRACTITIONER

## 2022-11-01 PROCEDURE — 99285 EMERGENCY DEPT VISIT HI MDM: CPT

## 2022-11-01 PROCEDURE — 94640 AIRWAY INHALATION TREATMENT: CPT

## 2022-11-01 PROCEDURE — 87502 INFLUENZA DNA AMP PROBE: CPT

## 2022-11-01 PROCEDURE — 93005 ELECTROCARDIOGRAM TRACING: CPT | Performed by: STUDENT IN AN ORGANIZED HEALTH CARE EDUCATION/TRAINING PROGRAM

## 2022-11-01 PROCEDURE — 80053 COMPREHEN METABOLIC PANEL: CPT

## 2022-11-01 PROCEDURE — 94761 N-INVAS EAR/PLS OXIMETRY MLT: CPT

## 2022-11-01 PROCEDURE — 71045 X-RAY EXAM CHEST 1 VIEW: CPT

## 2022-11-01 PROCEDURE — 85025 COMPLETE CBC W/AUTO DIFF WBC: CPT

## 2022-11-01 PROCEDURE — 6370000000 HC RX 637 (ALT 250 FOR IP): Performed by: NURSE PRACTITIONER

## 2022-11-01 PROCEDURE — 36415 COLL VENOUS BLD VENIPUNCTURE: CPT

## 2022-11-01 RX ORDER — PREDNISONE 10 MG/1
50 TABLET ORAL DAILY
Qty: 25 TABLET | Refills: 0 | Status: SHIPPED | OUTPATIENT
Start: 2022-11-01 | End: 2022-11-06

## 2022-11-01 RX ORDER — AMLODIPINE BESYLATE 5 MG/1
5 TABLET ORAL ONCE
Status: COMPLETED | OUTPATIENT
Start: 2022-11-01 | End: 2022-11-01

## 2022-11-01 RX ORDER — METHYLPREDNISOLONE SODIUM SUCCINATE 125 MG/2ML
125 INJECTION, POWDER, LYOPHILIZED, FOR SOLUTION INTRAMUSCULAR; INTRAVENOUS ONCE
Status: COMPLETED | OUTPATIENT
Start: 2022-11-01 | End: 2022-11-01

## 2022-11-01 RX ORDER — ALBUTEROL SULFATE 2.5 MG/3ML
2.5 SOLUTION RESPIRATORY (INHALATION) EVERY 6 HOURS PRN
Status: DISCONTINUED | OUTPATIENT
Start: 2022-11-01 | End: 2022-11-01 | Stop reason: HOSPADM

## 2022-11-01 RX ORDER — ALPRAZOLAM 0.5 MG/1
0.5 TABLET ORAL ONCE
Status: COMPLETED | OUTPATIENT
Start: 2022-11-01 | End: 2022-11-01

## 2022-11-01 RX ADMIN — ALPRAZOLAM 0.5 MG: 0.5 TABLET ORAL at 14:45

## 2022-11-01 RX ADMIN — METHYLPREDNISOLONE SODIUM SUCCINATE 125 MG: 125 INJECTION, POWDER, FOR SOLUTION INTRAMUSCULAR; INTRAVENOUS at 13:13

## 2022-11-01 RX ADMIN — LIDOCAINE HYDROCHLORIDE: 20 SOLUTION ORAL; TOPICAL at 14:45

## 2022-11-01 RX ADMIN — AMLODIPINE BESYLATE 5 MG: 5 TABLET ORAL at 14:45

## 2022-11-01 RX ADMIN — ALBUTEROL SULFATE 2.5 MG: 2.5 SOLUTION RESPIRATORY (INHALATION) at 13:12

## 2022-11-01 ASSESSMENT — ENCOUNTER SYMPTOMS
TROUBLE SWALLOWING: 0
WHEEZING: 0
NAUSEA: 0
COUGH: 0
ABDOMINAL PAIN: 0
VOMITING: 0
BACK PAIN: 0
SORE THROAT: 0
DIARRHEA: 0
CHEST TIGHTNESS: 0
SHORTNESS OF BREATH: 1

## 2022-11-01 ASSESSMENT — PAIN DESCRIPTION - DESCRIPTORS: DESCRIPTORS: DISCOMFORT

## 2022-11-01 ASSESSMENT — PAIN DESCRIPTION - ORIENTATION: ORIENTATION: MID;RIGHT;LEFT

## 2022-11-01 ASSESSMENT — HEART SCORE: ECG: 0

## 2022-11-01 ASSESSMENT — PAIN SCALES - GENERAL: PAINLEVEL_OUTOF10: 10

## 2022-11-01 ASSESSMENT — PAIN DESCRIPTION - LOCATION: LOCATION: CHEST;RIB CAGE

## 2022-11-01 ASSESSMENT — PAIN - FUNCTIONAL ASSESSMENT: PAIN_FUNCTIONAL_ASSESSMENT: 0-10

## 2022-11-01 ASSESSMENT — PAIN DESCRIPTION - PAIN TYPE: TYPE: ACUTE PAIN

## 2022-11-01 NOTE — ED TRIAGE NOTES
Pt to ed from home via triage with c/o shortness of breath and cough, worsening over past 2 weeks. Pt denies chest pain but reports rib pain from coughing  Pt reports use of inhaler and nebs at home without relief. Skin WDI, respirations even and labored. Bilateral lung sounds diminished throughout  No signs of distress noted.

## 2022-11-01 NOTE — ED NOTES
Computer locked when administering solumedrol.    125mg given at this time     Clementina Altamirano RN  11/01/22 8174

## 2022-11-01 NOTE — Clinical Note
Laura Keene accompanied Roxan Goodpasture to the emergency department on 11/1/2022. They may return to work on 11/02/2022. If you have any questions or concerns, please don't hesitate to call.       Vin Durán, APRN - CNP

## 2022-11-01 NOTE — Clinical Note
Chrismarilyn Zhen accompanied Velia Love to the emergency department on 11/1/2022. They may return to work on 11/02/2022. If you have any questions or concerns, please don't hesitate to call.       Kanika Cordon, ROOPA - CNP

## 2022-11-01 NOTE — TELEPHONE ENCOUNTER
Location of patient: [facility]    Received call from April at [facility] with [employer] O[employer]. Subjective: Caller states \"Not feeling well for 3 weeks, coughing up phlegm, rib pain all around, weakness, chills\"     Current Symptoms: Rapid covid test negative. Started 3 weeks ago with coughing, chills, sore throat, rib pain. Sinus drainage. Wheezing when taking a breath in. Diarrhea. Taking breathing treatments. Wheezing can be heard across the room. Onset: 3 weeks ago; gradual, worsening    Associated Symptoms: NA    Pain Severity: 10/10; N/A; intermittent worse when taking deep breath and coughing    Temperature: Unable to assess    What has been tried: Zycam, home remedy-not helping    LMP: NA Pregnant: NA    Recommended disposition: Go to ED Now    Care advice provided, patient verbalizes understanding; denies any other questions or concerns; instructed to call back for any new or worsening symptoms. Patient/caller agrees to proceed to nearest Emergency Department    Attention Provider: Thank you for allowing me to participate in the care of your patient. The patient was connected to triage in response to information provided to the ECC/PSC. Please do not respond through this encounter as the response is not directed to a shared pool.       Reason for Disposition   Wheezing can be heard across the room    Protocols used: Breathing Difficulty-ADULT-[place]

## 2022-11-01 NOTE — Clinical Note
Si Masker was seen and treated in our emergency department on 11/1/2022. She may return to work on 11/04/2022. If you have any questions or concerns, please don't hesitate to call.       Dion Elizondo, ROOPA - CNP

## 2022-11-01 NOTE — Clinical Note
Sisi Trinh was seen and treated in our emergency department on 11/1/2022. She may return to work on 11/04/2022. If you have any questions or concerns, please don't hesitate to call.       Nati Brooke, ROOPA - CNP

## 2022-11-01 NOTE — Clinical Note
Rola Savage accompanied Venus Choudhary to the emergency department on 11/1/2022. They may return to work on 11/02/2022. If you have any questions or concerns, please don't hesitate to call.       Moseh Crawford, ROOPA - CNP

## 2022-11-01 NOTE — ED PROVIDER NOTES
[address] [facility] ED  eMERGENCY dEPARTMENT eNCOUnter      Pt Name: Sheyla Reyes  MRN: [id number]  [place] 1965  Date of evaluation: 2022  Provider: ROOPA Rodgers CNP      HISTORY OF PRESENT ILLNESS    Saintclair Fuelling Hosie Borrow is a 64 y.o. female who presents to the Emergency Department with increasing SOB x 2 weeks. Satinet states she has asthma but her inhaler and nebulizer is giving her minimal relief. Pain is moderate. She has reproducible pain in the mid sternal area, doesn't radiate. Denies leg swelling, fever, recent illness. REVIEW OF SYSTEMS       Review of Systems   Constitutional:  Negative for activity change, appetite change and fever. HENT:  Negative for congestion, drooling, ear pain, sore throat and trouble swallowing. Respiratory:  Positive for shortness of breath. Negative for cough, chest tightness and wheezing. Cardiovascular:  Negative for chest pain, palpitations and leg swelling. Gastrointestinal:  Negative for abdominal pain, diarrhea, nausea and vomiting. Genitourinary:  Negative for dysuria. Musculoskeletal:  Negative for arthralgias, back pain and neck pain. Skin:  Negative for rash. Neurological:  Negative for dizziness, syncope, light-headedness and headaches. All other systems reviewed and are negative. PAST MEDICAL HISTORY     Past Medical History:   Diagnosis Date    Allergic rhinitis     Anxiety disorder     Cleft lip     Divergent strabismus     Diverticulitis 2013    s/p partial colectomy, Dr Manriquez    Hyperlipidemia LDL goal <100     Hypertension     Obesity (BMI 30-39. 9)     Severe persistent asthma without complication     intubated x 2013    Type 2 diabetes mellitus ([facility] [address] 2020    A1C 6.8         SURGICAL HISTORY       Past Surgical History:   Procedure Laterality Date     SECTION      x 3    CLEFT LIP REPAIR      OTHER SURGICAL HISTORY  13    Exploratory laparotomy with sigmoid colectomy, drainage pelvic abscess and formation end descending colostomy    OTHER SURGICAL HISTORY  3/20/14    Exploratory lapartomy with extensived lysis of adhesions, takedown of colostmy with primary stapled anastomosis, rigid sigmoidoscopy         CURRENT MEDICATIONS       Previous Medications    ALBUTEROL (PROVENTIL) (2.5 MG/3ML) 0.083% NEBULIZER SOLUTION    USE 1 VIAL VIA NEBULIZER EVERY 6 HOURS AS NEEDED    ALBUTEROL SULFATE HFA (PROVENTIL;VENTOLIN;PROAIR) 108 (90 BASE) MCG/ACT INHALER    INHALE TWO PUFFS BY MOUTH FOUR TIMES A DAY AS NEEDED FOR WHEEZING    ALPRAZOLAM (XANAX) 0.5 MG TABLET    Take 1 tablet by mouth nightly as needed for Sleep or Anxiety for up to 180 days.     AMLODIPINE (NORVASC) 5 MG TABLET    TAKE ONE TABLET BY MOUTH EVERY DAY    AZELASTINE (ASTELIN) 0.1 % NASAL SPRAY    1 spray by Nasal route 2 times daily Use in each nostril as directed    BUDESON-GLYCOPYRROL-FORMOTEROL 160-9-4.8 MCG/ACT AERO    Inhale 2 puffs into the lungs 2 times daily    FERROUS SULFATE (FE TABS) 325 (65 FE) MG EC TABLET    Take 1 tablet by mouth every morning (before breakfast)    FLUTICASONE (FLONASE) 50 MCG/ACT NASAL SPRAY    1 spray by Nasal route daily    FOLIC ACID (FOLVITE) 060 MCG TABLET    TAKE ONE TABLET BY MOUTH EVERY DAY    LORATADINE (CLARITIN) 10 MG TABLET    Take 1 tablet by mouth daily    MONTELUKAST (SINGULAIR) 10 MG TABLET    TAKE 1 TABLET BY MOUTH EVERY NIGHT AT BEDTIME    ONDANSETRON (ZOFRAN) 4 MG TABLET    Take 1 tablet by mouth every 12 hours as needed for Nausea or Vomiting    PANTOPRAZOLE (PROTONIX) 20 MG TABLET    Take 1 tablet by mouth daily    POLYETHYLENE GLYCOL (GLYCOLAX) 17 GM/SCOOP POWDER    USE 17GMS AS DIRECTED ONCE DAILY AS NEEDED FOR CONSTIPATION    PRAVASTATIN (PRAVACHOL) 40 MG TABLET    TAKE ONE TABLET BY MOUTH EVERY DAY    RESPIRATORY THERAPY SUPPLIES (NEBULIZER/TUBING/MOUTHPIECE) KIT    1 kit by Does not apply route daily as needed (use as directed)    SPACER/AERO-HOLDING CHAMBERS (E-Z SPACER) GASTON    Us as directed for asthma    VITAMIN B-12 (CYANOCOBALAMIN) 100 MCG TABLET    TAKE ONE TABLET BY MOUTH EVERY DAY       ALLERGIES     Bacitracin, Butalbital-aspirin-caffeine, Codeine, Floxin [ofloxacin], Rocephin [ceftriaxone sodium], Tomato, Asa [aspirin], Lisinopril, and Azithromycin    FAMILY HISTORY       Family History   Problem Relation Age of Onset    Heart Failure Mother         dec age    Diabetes Mother     Cancer Sister         pancreas, dec age 40          SOCIAL HISTORY       Social History     Socioeconomic History    Marital status:      Spouse name: None    Number of children: 1    Years of education: None    Highest education level: None   Occupational History    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[employer]    Tobacco Use    Smoking status: Never    Smokeless tobacco: Never   Vaping Use    Vaping Use: Never used   Substance and Sexual Activity    Alcohol use: No     Alcohol/week: 0.0 standard drinks     Comment: social    Drug use: No   Social History Narrative    Born in [facility], one of 3     Dec 30, 2015 to a [place]    3 children on [facility]    Works in [employer] [address] 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[place] [facility] [place] 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 and grandson    Hobbies cooking, family, Roomishling     Social Determinants of Health     Financial Resource Strain: High Risk    Difficulty of Paying Living Expenses: Hard   Food Insecurity: Food Insecurity Present    Worried About Running Out of Food in the Last Year: Sometimes true    Ran Out of Food in the Last Year: Sometimes true       SCREENINGS    Valeriy Coma Scale  Eye Opening: Spontaneous  Best Verbal Response: Oriented  Best Motor Response: Obeys commands  Tatamy Coma Scale Score: 15 @FLOW([id number])@      PHYSICAL EXAM    (up to 7 for level 4, 8 or more for level 5)     ED Triage Vitals [11/01/22 1204]   BP Temp Temp Source Heart Rate Resp SpO2 Height Weight   (!) 140/98 98.4 °F (36.9 °C) Oral 86 18 99 % 5' 5\" (1.651 m) 184 lb 8 oz (83.7 kg) Physical Exam  Vitals and nursing note reviewed. Constitutional:       Appearance: She is well-developed. HENT:      Head: Normocephalic and atraumatic. Right Ear: Hearing, tympanic membrane, ear canal and external ear normal.      Left Ear: Hearing, tympanic membrane, ear canal and external ear normal.      Nose: Nose normal.      Mouth/Throat:      Lips: Pink. Mouth: Mucous membranes are moist.      Pharynx: Oropharynx is clear. Uvula midline. Eyes:      Conjunctiva/sclera: Conjunctivae normal.      Pupils: Pupils are equal, round, and reactive to light. Cardiovascular:      Rate and Rhythm: Normal rate and regular rhythm. Heart sounds: Normal heart sounds. Pulmonary:      Effort: Pulmonary effort is normal. No accessory muscle usage or respiratory distress. Breath sounds: Normal breath sounds. No decreased air movement. No decreased breath sounds, wheezing or rhonchi. Abdominal:      General: Bowel sounds are normal. There is no distension. Palpations: Abdomen is soft. Tenderness: There is no abdominal tenderness. Musculoskeletal:         General: Normal range of motion. Cervical back: Normal range of motion and neck supple. Skin:     General: Skin is warm and dry. Neurological:      General: No focal deficit present. Mental Status: She is alert and oriented to person, place, and time. GCS: GCS eye subscore is 4. GCS verbal subscore is 5. GCS motor subscore is 6. Deep Tendon Reflexes: Reflexes are normal and symmetric. Psychiatric:         Judgment: Judgment normal.         All other labs were within normal range or not returned as of this dictation.     EMERGENCY DEPARTMENT COURSE and DIFFERENTIALDIAGNOSIS/MDM:   Vitals:    Vitals:    11/01/22 1204 11/01/22 1306 11/01/22 1346   BP: (!) 140/98 (!) 165/99 (!) 144/100   Pulse: 86 82 75   Resp: 18 17 19   Temp: 98.4 °F (36.9 °C)     TempSrc: Oral     SpO2: 99% 96% 100%   Weight: 184 lb 8 oz

## 2022-11-01 NOTE — Clinical Note
Sultana Melendez was seen and treated in our emergency department on 11/1/2022. She may return to work on 11/07/2022. If you have any questions or concerns, please don't hesitate to call.       Aj Cole, ROOPA - CNP

## 2022-11-01 NOTE — ED NOTES
Pt reports that she is feeling better. Provider made aware.  Provider also made aware that pt has not taken oral HTN meds today     Mely Cuello RN  11/01/22 1400

## 2022-11-01 NOTE — Clinical Note
Lynne Zavala was seen and treated in our emergency department on 11/1/2022. She may return to work on 11/04/2022. If you have any questions or concerns, please don't hesitate to call.       Nola Mahajan, APRN - CNP

## 2022-11-02 ENCOUNTER — TELEPHONE (OUTPATIENT)
Dept: FAMILY MEDICINE CLINIC | Age: 57
End: 2022-11-02

## 2022-11-02 LAB
EKG ATRIAL RATE: 80 BPM
EKG P AXIS: 45 DEGREES
EKG P-R INTERVAL: 162 MS
EKG Q-T INTERVAL: 388 MS
EKG QRS DURATION: 82 MS
EKG QTC CALCULATION (BAZETT): 447 MS
EKG R AXIS: 27 DEGREES
EKG T AXIS: 24 DEGREES
EKG VENTRICULAR RATE: 80 BPM

## 2022-11-02 PROCEDURE — 93010 ELECTROCARDIOGRAM REPORT: CPT | Performed by: INTERNAL MEDICINE

## 2022-11-02 RX ORDER — DEXTROMETHORPHAN HYDROBROMIDE AND PROMETHAZINE HYDROCHLORIDE 15; 6.25 MG/5ML; MG/5ML
5 SYRUP ORAL 4 TIMES DAILY PRN
Qty: 150 ML | Refills: 0 | Status: SHIPPED | OUTPATIENT
Start: 2022-11-02 | End: 2022-11-09

## 2022-11-02 NOTE — TELEPHONE ENCOUNTER
Patient called because she was seen in the ED yesterday. They did not give her anything for her cough. She is asking if something can please be sent in to [facility] in [place]. Please advise, thank you.

## 2022-11-02 NOTE — TELEPHONE ENCOUNTER
Patient would like to establish care with Dr. Shanda Becker. Please assist patient with the transfer. I see patient has an appointment for hospital follow up on 11/7 and another one in December. Please call her and help her find the next available time with Dr. Shanda Becker.  Thank you

## 2022-11-02 NOTE — TELEPHONE ENCOUNTER
----- Message from Hema Melton sent at 11/1/2022  4:48 PM EDT -----  Subject: Message to Provider    QUESTIONS  Information for Provider? Pt would like to change PCPs from Dr. Gabby Polanco to   Dr. Biju Neumann.   ---------------------------------------------------------------------------  --------------  [facility]  [phone]; OK to leave message on voicemail  ---------------------------------------------------------------------------  --------------  SCRIPT ANSWERS  Relationship to Patient?  Self

## 2022-11-03 ENCOUNTER — TELEPHONE (OUTPATIENT)
Dept: FAMILY MEDICINE CLINIC | Age: 57
End: 2022-11-03

## 2022-11-03 NOTE — TELEPHONE ENCOUNTER
----- Message from Albert Keane sent at 11/3/2022 12:09 PM EDT -----  Subject: Results Request    QUESTIONS  Results: lab; Ordered by:   Date Performed: 2022-11-01  ---------------------------------------------------------------------------  --------------  Aparna Lee [facility]    [phone]; OK to leave message on voicemail  ---------------------------------------------------------------------------  --------------

## 2022-11-03 NOTE — TELEPHONE ENCOUNTER
Attempted to contact the patient to inform her that as soon as the provider puts the result notes in the system someone would call her - also attempted to schedule NTP appointment for Dr. Judd Hart to call the office to do so

## 2022-11-07 ENCOUNTER — OFFICE VISIT (OUTPATIENT)
Dept: FAMILY MEDICINE CLINIC | Age: 57
End: 2022-11-07
Payer: COMMERCIAL

## 2022-11-07 VITALS
HEIGHT: 65 IN | OXYGEN SATURATION: 99 % | SYSTOLIC BLOOD PRESSURE: 142 MMHG | DIASTOLIC BLOOD PRESSURE: 92 MMHG | HEART RATE: 72 BPM | TEMPERATURE: 97.1 F | BODY MASS INDEX: 30.66 KG/M2 | WEIGHT: 184 LBS | RESPIRATION RATE: 18 BRPM

## 2022-11-07 DIAGNOSIS — J06.9 UPPER RESPIRATORY INFECTION WITH COUGH AND CONGESTION: ICD-10-CM

## 2022-11-07 DIAGNOSIS — J40 BRONCHITIS: Primary | ICD-10-CM

## 2022-11-07 LAB
INFLUENZA A ANTIBODY: NEGATIVE
INFLUENZA B ANTIBODY: NEGATIVE
Lab: NORMAL
PERFORMING INSTRUMENT: NORMAL
QC PASS/FAIL: NORMAL
SARS-COV-2, POC: NORMAL

## 2022-11-07 PROCEDURE — 87804 INFLUENZA ASSAY W/OPTIC: CPT | Performed by: PHYSICIAN ASSISTANT

## 2022-11-07 PROCEDURE — 3074F SYST BP LT 130 MM HG: CPT | Performed by: PHYSICIAN ASSISTANT

## 2022-11-07 PROCEDURE — 99213 OFFICE O/P EST LOW 20 MIN: CPT | Performed by: PHYSICIAN ASSISTANT

## 2022-11-07 PROCEDURE — 87426 SARSCOV CORONAVIRUS AG IA: CPT | Performed by: PHYSICIAN ASSISTANT

## 2022-11-07 PROCEDURE — 3078F DIAST BP <80 MM HG: CPT | Performed by: PHYSICIAN ASSISTANT

## 2022-11-07 RX ORDER — AMOXICILLIN 250 MG/5ML
250 POWDER, FOR SUSPENSION ORAL 3 TIMES DAILY
Qty: 150 ML | Refills: 0 | Status: SHIPPED | OUTPATIENT
Start: 2022-11-07 | End: 2022-11-08

## 2022-11-07 RX ORDER — DEXTROMETHORPHAN HYDROBROMIDE AND PROMETHAZINE HYDROCHLORIDE 15; 6.25 MG/5ML; MG/5ML
5 SYRUP ORAL 4 TIMES DAILY PRN
Qty: 150 ML | Refills: 0 | Status: SHIPPED | OUTPATIENT
Start: 2022-11-07 | End: 2022-11-14

## 2022-11-07 ASSESSMENT — ENCOUNTER SYMPTOMS
COUGH: 1
SHORTNESS OF BREATH: 0
SORE THROAT: 1
TROUBLE SWALLOWING: 0
DIARRHEA: 0
ABDOMINAL PAIN: 0
BACK PAIN: 0
VOMITING: 0
SINUS PRESSURE: 0
CHEST TIGHTNESS: 0

## 2022-11-07 ASSESSMENT — PATIENT HEALTH QUESTIONNAIRE - PHQ9
SUM OF ALL RESPONSES TO PHQ QUESTIONS 1-9: 0
SUM OF ALL RESPONSES TO PHQ9 QUESTIONS 1 & 2: 0
2. FEELING DOWN, DEPRESSED OR HOPELESS: 0
1. LITTLE INTEREST OR PLEASURE IN DOING THINGS: 0
SUM OF ALL RESPONSES TO PHQ QUESTIONS 1-9: 0

## 2022-11-07 NOTE — PROGRESS NOTES
Subjective:      Patient ID: Vesna Ochoa is a 64 y.o. female who presents today for:  Chief Complaint   Patient presents with    Asthma     Patient presents today due to her asthma, coughing and cold for the last 3 weeks. HPI  64year old female who presents with a productive cough of yellow sputum, cold like symptoms and is concerned for her asthma. Had symptoms for 3 weeks. Past Medical History:   Diagnosis Date    Allergic rhinitis     Anxiety disorder     Cleft lip     Divergent strabismus     Diverticulitis 2013    s/p partial colectomy, Dr Pam Cheung    Hyperlipidemia LDL goal <100     Hypertension     Obesity (BMI 30-39. 9)     Severe persistent asthma without complication     intubated x 2013    Type 2 diabetes mellitus ([facility] [address] 2020    A1C 6.8     Past Surgical History:   Procedure Laterality Date     SECTION      x 3    CLEFT LIP REPAIR      OTHER SURGICAL HISTORY  13    Exploratory laparotomy with sigmoid colectomy, drainage pelvic abscess and formation end descending colostomy    OTHER SURGICAL HISTORY  3/20/14    Exploratory lapartomy with extensived lysis of adhesions, takedown of colostmy with primary stapled anastomosis, rigid sigmoidoscopy     Social History     Socioeconomic History    Marital status:      Spouse name: Not on file    Number of children: 1    Years of education: Not on file    Highest education level: Not on file   Occupational History    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[employer]    Tobacco Use    Smoking status: Never    Smokeless tobacco: Never   Vaping Use    Vaping Use: Never used   Substance and Sexual Activity    Alcohol use: No     Alcohol/week: 0.0 standard drinks     Comment: social    Drug use: No    Sexual activity: Not on file   Other Topics Concern    Not on file   Social History Narrative    Born in [facility], one of 3     Dec 30, 2015 to a [place]    3 children on [facility]    Works in [address] [place], [place] [address] 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[facility] 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 and grandson    [employer] cooking, family, bowling     Social Determinants of Health     Financial Resource Strain: High Risk    Difficulty of Paying Living Expenses: Hard   Food Insecurity: Food Insecurity Present    Worried About Running Out of Food in the Last Year: Sometimes true    Ran Out of Food in the Last Year: Sometimes true   Transportation Needs: Not on file   Physical Activity: Not on file   Stress: Not on file   Social Connections: Not on file   Intimate Partner Violence: Not on file   Housing Stability: Not on file     Family History   Problem Relation Age of Onset    Heart Failure Mother         dec age    Diabetes Mother     Cancer Sister         pancreas, dec age 40     Allergies   Allergen Reactions    Bacitracin Anaphylaxis    Butalbital-Aspirin-Caffeine Anaphylaxis    Codeine Anaphylaxis    Floxin [Ofloxacin] Anaphylaxis    Rocephin [Ceftriaxone Sodium] Anaphylaxis    Tomato Hives, Shortness Of Breath and Itching    Asa [Aspirin]     Lisinopril Swelling     Lip swelling    Azithromycin Rash     Current Outpatient Medications   Medication Sig Dispense Refill    amoxicillin (AMOXIL) 250 MG/5ML suspension Take 5 mLs by mouth 3 times daily for 10 days 150 mL 0    promethazine-dextromethorphan (PROMETHAZINE-DM) 6.25-15 MG/5ML syrup Take 5 mLs by mouth 4 times daily as needed for Cough 150 mL 0    promethazine-dextromethorphan (PROMETHAZINE-DM) 6.25-15 MG/5ML syrup Take 5 mLs by mouth 4 times daily as needed for Cough 150 mL 0    albuterol (PROVENTIL) (2.5 MG/3ML) 0.083% nebulizer solution USE 1 VIAL VIA NEBULIZER EVERY 6 HOURS AS NEEDED 450 mL 3    albuterol sulfate HFA (PROVENTIL;VENTOLIN;PROAIR) 108 (90 Base) MCG/ACT inhaler INHALE TWO PUFFS BY MOUTH FOUR TIMES A DAY AS NEEDED FOR WHEEZING 8.5 g 2    ALPRAZolam (XANAX) 0.5 MG tablet Take 1 tablet by mouth nightly as needed for Sleep or Anxiety for up to 180 days.  90 tablet 1    ondansetron (ZOFRAN) 4 MG tablet Take 1 tablet by mouth every 12 hours as needed for Nausea or Vomiting 30 tablet 3    fluticasone (FLONASE) 50 MCG/ACT nasal spray 1 spray by Nasal route daily 1 each 3    azelastine (ASTELIN) 0.1 % nasal spray 1 spray by Nasal route 2 times daily Use in each nostril as directed 1 each 2    montelukast (SINGULAIR) 10 MG tablet TAKE 1 TABLET BY MOUTH EVERY NIGHT AT BEDTIME 30 tablet 3    Budeson-Glycopyrrol-Formoterol 160-9-4.8 MCG/ACT AERO Inhale 2 puffs into the lungs 2 times daily 1 each 3    folic acid (FOLVITE) 076 MCG tablet TAKE ONE TABLET BY MOUTH EVERY DAY 90 tablet 1    amLODIPine (NORVASC) 5 MG tablet TAKE ONE TABLET BY MOUTH EVERY DAY 90 tablet 0    pravastatin (PRAVACHOL) 40 MG tablet TAKE ONE TABLET BY MOUTH EVERY DAY 90 tablet 1    vitamin B-12 (CYANOCOBALAMIN) 100 MCG tablet TAKE ONE TABLET BY MOUTH EVERY DAY 30 tablet 0    pantoprazole (PROTONIX) 20 MG tablet Take 1 tablet by mouth daily 30 tablet 3    polyethylene glycol (GLYCOLAX) 17 GM/SCOOP powder USE 17GMS AS DIRECTED ONCE DAILY AS NEEDED FOR CONSTIPATION 765 g 5    loratadine (CLARITIN) 10 MG tablet Take 1 tablet by mouth daily 30 tablet 3    ferrous sulfate (FE TABS) 325 (65 Fe) MG EC tablet Take 1 tablet by mouth every morning (before breakfast) 30 tablet 3    Respiratory Therapy Supplies (NEBULIZER/TUBING/MOUTHPIECE) KIT 1 kit by Does not apply route daily as needed (use as directed) 1 kit 0    Spacer/Aero-Holding Chambers (E-Z SPACER) GASTON Us as directed for asthma 1 Device 0     No current facility-administered medications for this visit. Review of Systems   Constitutional:  Negative for activity change, appetite change, chills, fever and unexpected weight change. HENT:  Positive for congestion and sore throat. Negative for drooling, ear pain, nosebleeds, sinus pressure and trouble swallowing. Respiratory:  Positive for cough. Negative for chest tightness and shortness of breath.     Cardiovascular:  Negative for chest pain and leg swelling. Gastrointestinal:  Negative for abdominal pain, diarrhea and vomiting. Endocrine: Negative for polydipsia and polyphagia. Genitourinary:  Negative for dysuria, flank pain and frequency. Musculoskeletal:  Negative for back pain and myalgias. Skin:  Negative for pallor and rash. Neurological:  Negative for syncope, weakness and headaches. Hematological:  Does not bruise/bleed easily. Psychiatric/Behavioral:  Negative for agitation, behavioral problems and confusion. All other systems reviewed and are negative. Objective:   BP (!) 142/92 (Site: Left Upper Arm, Position: Sitting, Cuff Size: Medium Adult)   Pulse 72   Temp 97.1 °F (36.2 °C) (Temporal)   Resp 18   Ht 5' 5\" (1.651 m)   Wt 184 lb (83.5 kg)   SpO2 99%   BMI 30.62 kg/m²     Physical Exam  Vitals and nursing note reviewed. Constitutional:       General: She is awake. She is not in acute distress. Appearance: Normal appearance. She is well-developed and normal weight. She is not ill-appearing, toxic-appearing or diaphoretic. Comments: No photophobia. No phonophobia. HENT:      Head: Normocephalic and atraumatic. No Vasquez's sign. Right Ear: External ear normal.      Left Ear: External ear normal.      Nose: Nose normal. No congestion or rhinorrhea. Mouth/Throat:      Mouth: Mucous membranes are moist.      Pharynx: Oropharynx is clear. No oropharyngeal exudate or posterior oropharyngeal erythema. Eyes:      General: No scleral icterus. Right eye: No foreign body or discharge. Left eye: No discharge. Extraocular Movements: Extraocular movements intact. Conjunctiva/sclera: Conjunctivae normal.      Left eye: No exudate. Pupils: Pupils are equal, round, and reactive to light. Neck:      Vascular: No JVD. Trachea: No tracheal deviation. Comments: No meningismus. Cardiovascular:      Rate and Rhythm: Normal rate and regular rhythm.       Heart sounds: Normal heart sounds. Heart sounds not distant. No murmur heard. No friction rub. No gallop. Pulmonary:      Effort: Pulmonary effort is normal. No respiratory distress. Breath sounds: Normal breath sounds. No stridor. No wheezing, rhonchi or rales. Chest:      Chest wall: No tenderness. Abdominal:      General: Abdomen is flat. Bowel sounds are normal. There is no distension. Palpations: Abdomen is soft. There is no mass. Tenderness: There is no abdominal tenderness. There is no right CVA tenderness, left CVA tenderness, guarding or rebound. Hernia: No hernia is present. Musculoskeletal:         General: No swelling, tenderness, deformity or signs of injury. Normal range of motion. Cervical back: Normal range of motion and neck supple. No rigidity. Lymphadenopathy:      Head:      Right side of head: No submental adenopathy. Left side of head: No submental adenopathy. Skin:     General: Skin is warm and dry. Capillary Refill: Capillary refill takes less than 2 seconds. Coloration: Skin is not jaundiced or pale. Findings: No bruising, erythema, lesion or rash. Neurological:      General: No focal deficit present. Mental Status: She is alert and oriented to person, place, and time. Mental status is at baseline. Cranial Nerves: No cranial nerve deficit. Sensory: No sensory deficit. Motor: No weakness. Coordination: Coordination normal.      Deep Tendon Reflexes: Reflexes are normal and symmetric. Psychiatric:         Mood and Affect: Mood normal.         Behavior: Behavior normal. Behavior is cooperative. Thought Content: Thought content normal.         Judgment: Judgment normal.       Assessment:       Diagnosis Orders   1. Bronchitis  amoxicillin (AMOXIL) 250 MG/5ML suspension    promethazine-dextromethorphan (PROMETHAZINE-DM) 6.25-15 MG/5ML syrup      2.  Upper respiratory infection with cough and congestion  POCT Influenza A/B POCT COVID-19, Antigen    amoxicillin (AMOXIL) 250 MG/5ML suspension    promethazine-dextromethorphan (PROMETHAZINE-DM) 6.25-15 MG/5ML syrup        No results found for this visit on 11/07/22. Plan:     Assessment & Plan   Imelda Klein was seen today for asthma. Diagnoses and all orders for this visit:    Bronchitis  -     amoxicillin (AMOXIL) 250 MG/5ML suspension; Take 5 mLs by mouth 3 times daily for 10 days  -     promethazine-dextromethorphan (PROMETHAZINE-DM) 6.25-15 MG/5ML syrup; Take 5 mLs by mouth 4 times daily as needed for Cough    Upper respiratory infection with cough and congestion  -     POCT Influenza A/B  -     POCT COVID-19, Antigen  -     amoxicillin (AMOXIL) 250 MG/5ML suspension; Take 5 mLs by mouth 3 times daily for 10 days  -     promethazine-dextromethorphan (PROMETHAZINE-DM) 6.25-15 MG/5ML syrup; Take 5 mLs by mouth 4 times daily as needed for Cough    Orders Placed This Encounter   Procedures    POCT Influenza A/B    POCT COVID-19, Antigen     Order Specific Question:   Is this test for diagnosis or screening? Answer:   Screening     Order Specific Question:   Symptomatic for COVID-19 as defined by CDC? Answer:   No     Order Specific Question:   Date of Symptom Onset     Answer:   N/A     Order Specific Question:   Hospitalized for COVID-19? Answer:   No     Order Specific Question:   Admitted to ICU for COVID-19? Answer:   No     Order Specific Question:   Employed in healthcare setting? Answer:   No     Order Specific Question:   Resident in a congregate (group) care setting? Answer:   No     Order Specific Question:   Pregnant? Answer:   No     Order Specific Question:   Previously tested for COVID-19?      Answer:   Unknown     Orders Placed This Encounter   Medications    amoxicillin (AMOXIL) 250 MG/5ML suspension     Sig: Take 5 mLs by mouth 3 times daily for 10 days     Dispense:  150 mL     Refill:  0    promethazine-dextromethorphan (PROMETHAZINE-DM) 6.25-15 MG/5ML syrup     Sig: Take 5 mLs by mouth 4 times daily as needed for Cough     Dispense:  150 mL     Refill:  0     There are no discontinued medications. Return if symptoms worsen or fail to improve. Reviewed with the patient/family: current clinical status & medications. Side effects of the medication prescribed today, as well as treatment plan/rationale and result expectations have been discussed with the patient/family who expresses understanding. Patient will be discharged home in stable condition. Follow up with PCP to evaluate treatment results or return if symptoms worsen or fail to improve. Discussed signs and symptoms which require immediate follow-up in ED/call to 911. Understanding verbalized. I have reviewed the patient's medical history in detail and updated the computerized patient record.     RONY Martinez

## 2022-11-08 ENCOUNTER — TELEPHONE (OUTPATIENT)
Dept: FAMILY MEDICINE CLINIC | Age: 57
End: 2022-11-08

## 2022-11-08 DIAGNOSIS — J40 BRONCHITIS: Primary | ICD-10-CM

## 2022-11-08 RX ORDER — AMOXICILLIN 500 MG/1
500 CAPSULE ORAL 2 TIMES DAILY
Qty: 20 CAPSULE | Refills: 0 | Status: SHIPPED | OUTPATIENT
Start: 2022-11-08 | End: 2022-11-18

## 2022-11-08 NOTE — TELEPHONE ENCOUNTER
Patient was seen in the walk-in on 11/7 and was prescribed amoxicillin suspension which the pharmacy states is on back order. Pharmacy is inquiring if a script for the capsules can be prescribed instead. States patient is ok with the change. Please advise. Thank you.

## 2022-11-14 ENCOUNTER — TELEPHONE (OUTPATIENT)
Dept: FAMILY MEDICINE CLINIC | Age: 57
End: 2022-11-14

## 2022-11-17 NOTE — TELEPHONE ENCOUNTER
JOSIE eplaining to the pt that the paperwork can not be filled out until she has her appt with him on the 11/25/2022

## 2022-11-17 NOTE — TELEPHONE ENCOUNTER
Patient called asking if  filled out and faxed back the [employer] paper work needed yet. I advised her that it was just scanned into our system yesterday and that I would let Dr. Giovana Núñez and Barbara Campbell know it is here and needs to be done ASAP.       Please Advise Thank You

## 2022-11-18 ENCOUNTER — TELEPHONE (OUTPATIENT)
Dept: FAMILY MEDICINE CLINIC | Age: 57
End: 2022-11-18

## 2022-11-18 NOTE — TELEPHONE ENCOUNTER
----- Message from Ghulam Mosher sent at 11/18/2022 10:54 AM EST -----  Subject: Message to Provider    QUESTIONS  Information for Provider? PT NEEDS THIS MESSAGE FOR MARITZA MURRAY   ,PT WAS SEEN BY HIM ON 11-7-2022 AND HE TOLD PT SHE NEEDED 3 MORE DAYS OFF   WORK TO RECOVER FROM BRONCHITIS AND NOW PT NEEDS HER [employer] PAPERS FILLED   OUT ,PT BROUGHT IN PAPERS AND WAS TOLD THEY ARE IN THE SYSTEM PT NEEDS   THIS FILLED OUT BECAUSE SHE HAS BEEN OFF WORK AND NEEDS TO TURN THIS IN   ASAP IF ANY QUESTION CALL PT ,SHE NEEDS THIS ASAP PLEASE,  ---------------------------------------------------------------------------  --------------  Sandy POPE  [phone]; OK to leave message on voicemail  ---------------------------------------------------------------------------  --------------  SCRIPT ANSWERS  Relationship to Patient?  Self Subjective    Allergies  Cymbalta (Reaction: itching, swelling), gabapentin (Reaction: rash), Gnp iodides decolorized, shellfish anaphylaxis, tetracycline base (Reaction: rash), Aleve (Reaction: rash), aspirin, bactrin (Reaction: rash), peanuts (Reaction: The periwound skin texture is normal. The periwound skin color is normal. The periwound skin exhibited: Dry/Scaly. The temperature of the periwound skin is WNL. Periwound skin does not exhibit signs or symptoms of infection.  Local Pulse is Normal.    Wound Plan: Patient to follow up in outpatient wound clinic weekly. C to change wound vac as previously ordered. Awaiting wound culture results.    Plan    Wound Orders:  Wound #3a Sacral     Wound Cleansing & Dressings  Clean wound with Normal Saline or Wound Applied Offloading device.  using 1/4\" thick adhesive felt applied to periwound (1)

## 2022-11-21 ENCOUNTER — OFFICE VISIT (OUTPATIENT)
Dept: PULMONOLOGY | Age: 57
End: 2022-11-21
Payer: COMMERCIAL

## 2022-11-21 VITALS
HEART RATE: 94 BPM | HEIGHT: 65 IN | OXYGEN SATURATION: 99 % | DIASTOLIC BLOOD PRESSURE: 86 MMHG | SYSTOLIC BLOOD PRESSURE: 138 MMHG | WEIGHT: 185 LBS | BODY MASS INDEX: 30.82 KG/M2 | TEMPERATURE: 97 F | RESPIRATION RATE: 16 BRPM

## 2022-11-21 DIAGNOSIS — J45.50 SEVERE PERSISTENT ASTHMA WITHOUT COMPLICATION: Primary | ICD-10-CM

## 2022-11-21 DIAGNOSIS — R09.81 NASAL CONGESTION: ICD-10-CM

## 2022-11-21 DIAGNOSIS — R23.2 HOT FLASHES: ICD-10-CM

## 2022-11-21 DIAGNOSIS — E66.09 CLASS 1 OBESITY DUE TO EXCESS CALORIES WITHOUT SERIOUS COMORBIDITY WITH BODY MASS INDEX (BMI) OF 33.0 TO 33.9 IN ADULT: ICD-10-CM

## 2022-11-21 DIAGNOSIS — K21.9 GASTROESOPHAGEAL REFLUX DISEASE WITHOUT ESOPHAGITIS: ICD-10-CM

## 2022-11-21 DIAGNOSIS — J30.2 SEASONAL ALLERGIES: ICD-10-CM

## 2022-11-21 LAB — TSH REFLEX: 1.68 UIU/ML (ref 0.44–3.86)

## 2022-11-21 PROCEDURE — 99213 OFFICE O/P EST LOW 20 MIN: CPT | Performed by: INTERNAL MEDICINE

## 2022-11-21 PROCEDURE — 3078F DIAST BP <80 MM HG: CPT | Performed by: INTERNAL MEDICINE

## 2022-11-21 PROCEDURE — 3074F SYST BP LT 130 MM HG: CPT | Performed by: INTERNAL MEDICINE

## 2022-11-21 RX ORDER — BENRALIZUMAB 30 MG/ML
INJECTION, SOLUTION SUBCUTANEOUS
COMMUNITY
Start: 2022-10-12

## 2022-11-21 RX ORDER — MONTELUKAST SODIUM 10 MG/1
TABLET ORAL
Qty: 30 TABLET | Refills: 3 | Status: SHIPPED | OUTPATIENT
Start: 2022-11-21

## 2022-11-21 NOTE — PROGRESS NOTES
Subjective:     Tomasz Naik is a [address] 81 y.o. female who complains today of:     Chief Complaint   Patient presents with    Follow-up     3 Month F/U for Severe persistent asthma       HPI  Patient presents for asthma      Patient presents for asthma follow-up, she had recent exacerbation with the ED visit, she was out of work for 2 weeks, she is currently on Advisityter[employer] along with Singulair, compliant with her treatment, she feels Rick Crater is helping significantly, she had her treatment interrupted due to insurance coverage issue, otherwise doing okay, no coughing, no chest pain, no nighttime symptoms of wheezing, she has hot flashes, no lower extremity edema, no fever no chills, her weight is stable. She has insomnia. No lower extremity edema. Allergies:  Bacitracin, Butalbital-aspirin-caffeine, Codeine, Floxin [ofloxacin], Rocephin [ceftriaxone sodium], Tomato, Asa [aspirin], Lisinopril, and Azithromycin  Past Medical History:   Diagnosis Date    Allergic rhinitis     Anxiety disorder     Cleft lip     Divergent strabismus     Diverticulitis 2013    s/p partial colectomy, Dr Chrissy Ledbetter    Hyperlipidemia LDL goal <100     Hypertension     Obesity (BMI 30-39. 9)     Severe persistent asthma without complication     intubated x 2013    Type 2 diabetes mellitus ([facility] [address] 2020    A1C 6.8     Past Surgical History:   Procedure Laterality Date     SECTION      x 3    CLEFT LIP REPAIR      OTHER SURGICAL HISTORY  13    Exploratory laparotomy with sigmoid colectomy, drainage pelvic abscess and formation end descending colostomy    OTHER SURGICAL HISTORY  3/20/14    Exploratory lapartomy with extensived lysis of adhesions, takedown of colostmy with primary stapled anastomosis, rigid sigmoidoscopy     Family History   Problem Relation Age of Onset    Heart Failure Mother         dec age    Diabetes Mother     Cancer Sister         pancreas, dec age 40     Social History Socioeconomic History    Marital status:      Spouse name: Not on file    Number of children: 1    Years of education: Not on file    Highest education level: Not on file   Occupational History    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[place] [facility] 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 and grandson    Hobbies cooking, family, bowling     Social Determinants of Health     Financial Resource Strain: High Risk    Difficulty of Paying Living Expenses: Hard   Food Insecurity: Food Insecurity Present    Worried About [address] in the Last Year: Sometimes true    [address] in the Last Year: Sometimes true   Transportation Needs: Not on file   Physical Activity: Not on file   Stress: Not on file   Social Connections: Not on file   Intimate Partner Violence: Not on file   Housing Stability: Not on file         Review of Systems      ROS: 10 organs review of system is done including general, psychological, ENT, hematological, endocrine, respiratory, cardiovascular, gastrointestinal,musculoskeletal, neurological,  allergy and Immunology is done and is otherwise negative.     Current Outpatient Medications   Medication Sig Dispense Refill    FASENRA PEN 30 MG/ML SOAJ       Budeson-Glycopyrrol-Formoterol 160-9-4.8 MCG/ACT AERO Inhale 2 puffs into the lungs 2 times daily 1 each 3    montelukast (SINGULAIR) 10 MG tablet TAKE 1 TABLET BY MOUTH EVERY NIGHT AT BEDTIME 30 tablet 3    albuterol (PROVENTIL) (2.5 MG/3ML) 0.083% nebulizer solution USE 1 VIAL VIA NEBULIZER EVERY 6 HOURS AS NEEDED 450 mL 3    albuterol sulfate HFA (PROVENTIL;VENTOLIN;PROAIR) 108 (90 Base) MCG/ACT inhaler INHALE TWO PUFFS BY MOUTH FOUR TIMES A DAY AS NEEDED FOR WHEEZING 8.5 g 2    ALPRAZolam (XANAX) 0.5 MG tablet Take 1 tablet by mouth nightly as needed for Sleep or Anxiety for up to 180 days. 90 tablet 1    ondansetron (ZOFRAN) 4 MG tablet Take 1 tablet by mouth every 12 hours as needed for Nausea or Vomiting 30 tablet 3    fluticasone (FLONASE) 50 MCG/ACT nasal spray 1 spray by Nasal route daily 1 each 3    azelastine (ASTELIN) 0.1 % nasal spray 1 spray by Nasal route 2 times daily Use in each nostril as directed 1 each 2    folic acid (FOLVITE) 376 MCG tablet TAKE ONE TABLET BY MOUTH EVERY DAY 90 tablet 1    amLODIPine (NORVASC) 5 MG tablet TAKE ONE TABLET BY MOUTH EVERY DAY 90 tablet 0    pravastatin (PRAVACHOL) 40 MG tablet TAKE ONE TABLET BY MOUTH EVERY DAY 90 tablet 1    vitamin B-12 (CYANOCOBALAMIN) 100 MCG tablet TAKE ONE TABLET BY MOUTH EVERY DAY 30 tablet 0    pantoprazole (PROTONIX) 20 MG tablet Take 1 tablet by mouth daily 30 tablet 3    polyethylene glycol (GLYCOLAX) 17 GM/SCOOP powder USE 17GMS AS DIRECTED ONCE DAILY AS NEEDED FOR CONSTIPATION 765 g 5    loratadine (CLARITIN) 10 MG tablet Take 1 tablet by mouth daily 30 tablet 3    ferrous sulfate (FE TABS) 325 (65 Fe) MG EC tablet Take 1 tablet by mouth every morning (before breakfast) 30 tablet 3    Respiratory Therapy Supplies (NEBULIZER/TUBING/MOUTHPIECE) KIT 1 kit by Does not apply route daily as needed (use as directed) 1 kit 0    Spacer/Aero-Holding Chambers (E-Z SPACER) GASTON Us as directed for asthma 1 Device 0     No current facility-administered medications for this visit.        Objective:     Vitals:    11/21/22 1522   BP: 138/86   Site: Right Upper Arm   Position: Sitting   Cuff Size: Large Adult   Pulse: 94   Resp: 16   Temp: 97 °F (36.1 °C)   TempSrc: Infrared   SpO2: 99%   Weight: 185 lb (83.9 kg)   Height: 5' 5\" (1.651 m) Physical Exam  Constitutional:       General: She is not in acute distress. Appearance: She is well-developed. She is not diaphoretic. HENT:      Head: Normocephalic and atraumatic. Eyes:      Conjunctiva/sclera: Conjunctivae normal.      Pupils: Pupils are equal, round, and reactive to light. Cardiovascular:      Rate and Rhythm: Normal rate and regular rhythm. Heart sounds: No murmur heard. No friction rub. No gallop. Pulmonary:      Effort: Pulmonary effort is normal. No respiratory distress. Breath sounds: Normal breath sounds. No wheezing or rales. Chest:      Chest wall: No tenderness. Abdominal:      General: There is no distension. Palpations: Abdomen is soft. Tenderness: There is no abdominal tenderness. There is no rebound. Musculoskeletal:         General: No tenderness. Cervical back: Normal range of motion and neck supple. Right lower leg: No edema. Left lower leg: No edema. Lymphadenopathy:      Cervical: No cervical adenopathy. Skin:     General: Skin is warm and dry. Findings: No erythema. Neurological:      Mental Status: She is alert and oriented to person, place, and time. Psychiatric:         Judgment: Judgment normal.       Imaging studies reviewed by me chest x-ray January 2021 is unremarkable  Lab results reviewed in chart  PFT February 2021, FEV1 57%, FEV1/FVC 0.57  Home sleep study is negative  Home sleep study April 2022, no CHARISMA  Assessment and Plan       Diagnosis Orders   1. Severe persistent asthma without complication  FASENRA PEN 30 MG/ML SOAJ    Budeson-Glycopyrrol-Formoterol 160-9-4.8 MCG/ACT AERO      2. Seasonal allergies  montelukast (SINGULAIR) 10 MG tablet      3. Hot flashes  TSH with Reflex      4. Gastroesophageal reflux disease without esophagitis        5. Class 1 obesity due to excess calories without serious comorbidity with body mass index (BMI) of 33.0 to 33.9 in adult        6.  Nasal congestion          Asthma, symptoms better controlled, however still not perfect, will continue [place] for now currently covered and no interruption is expected, will reevaluate on follow-up, continue Breztri and Singulair. Hot flashes, will check TSH  GERD symptoms controlled, currently on Protonix  Nasal congestion, continue azelastine and Flonase along with Claritin and Singulair  Yearly flu shot      Orders Placed This Encounter   Procedures    TSH with Reflex     Standing Status:   Future     Standing Expiration Date:   11/21/2023     Orders Placed This Encounter   Medications    Budeson-Glycopyrrol-Formoterol 160-9-4.8 MCG/ACT AERO     Sig: Inhale 2 puffs into the lungs 2 times daily     Dispense:  1 each     Refill:  3     4 Samples given  LOT: [id number]  EX: 12/2024    montelukast (SINGULAIR) 10 MG tablet     Sig: TAKE 1 TABLET BY MOUTH EVERY NIGHT AT BEDTIME     Dispense:  30 tablet     Refill:  3            Discussed with patient the importance of exercise and weight control and  overall health and well-being. Reviewed with the patient: current clinical status, medications, activities and diet. Side effects, adverse effects of the medication prescribed today, as well as treatment plan and result expectations have been discussed with the patient who expresses understanding and desires to proceed. Return in about 8 weeks (around 1/16/2023).       Aki Sanders MD

## 2022-11-25 ENCOUNTER — TELEPHONE (OUTPATIENT)
Dept: PULMONOLOGY | Age: 57
End: 2022-11-25

## 2022-12-02 NOTE — TELEPHONE ENCOUNTER
SAMPLES OF TRELEGY 200 GIVEN TO PATIENT BY SADA GREENE: 2  LOT# [id number]  EXP 4/2024      PLEASE SIGN OFF ON SAMPLES -- PLEASE DO NOT REFUSE SINCE THESE SAMPLES HAVE ALREADY BEEN GIVEN TO PATIENT

## 2022-12-07 ENCOUNTER — TELEPHONE (OUTPATIENT)
Dept: FAMILY MEDICINE CLINIC | Age: 57
End: 2022-12-07

## 2022-12-07 NOTE — TELEPHONE ENCOUNTER
----- Message from Jong Rosenthal sent at 11/25/2022  8:47 AM EST -----  Subject: Appointment Request    Reason for Call: New Patient/New to Provider Appointment needed: Routine   Existing Condition Follow Up    QUESTIONS    Reason for appointment request? Available appointments did not meet   patient need     Additional Information for Provider? Pt called to reschedule her appt with   Dr. Ifeoma Galvez. Appt has been cancelled as pt is unable tov make it. No   available appts until 12/20 and pt would like to be seen sooner.  Call pt   to reschedule Follow up- Est care appt.  ---------------------------------------------------------------------------  --------------  Tamara RAMOS  [phone]; OK to leave message on voicemail  ---------------------------------------------------------------------------  --------------  SCRIPT ANSWERS  COVID Screen: Mendez Tillman

## 2022-12-08 ENCOUNTER — TELEPHONE (OUTPATIENT)
Dept: FAMILY MEDICINE CLINIC | Age: 57
End: 2022-12-08

## 2022-12-08 ENCOUNTER — TELEPHONE (OUTPATIENT)
Dept: GASTROENTEROLOGY | Age: 57
End: 2022-12-08

## 2023-01-04 ENCOUNTER — OFFICE VISIT (OUTPATIENT)
Dept: FAMILY MEDICINE CLINIC | Age: 58
End: 2023-01-04
Payer: COMMERCIAL

## 2023-01-04 VITALS
BODY MASS INDEX: 30.49 KG/M2 | SYSTOLIC BLOOD PRESSURE: 163 MMHG | DIASTOLIC BLOOD PRESSURE: 107 MMHG | HEIGHT: 65 IN | HEART RATE: 86 BPM | OXYGEN SATURATION: 98 % | RESPIRATION RATE: 16 BRPM | WEIGHT: 183 LBS

## 2023-01-04 DIAGNOSIS — I10 HYPERTENSION, UNSPECIFIED TYPE: Primary | ICD-10-CM

## 2023-01-04 DIAGNOSIS — R73.03 PRE-DIABETES: ICD-10-CM

## 2023-01-04 DIAGNOSIS — E78.5 HYPERLIPIDEMIA, UNSPECIFIED HYPERLIPIDEMIA TYPE: ICD-10-CM

## 2023-01-04 DIAGNOSIS — J45.41 MODERATE PERSISTENT ASTHMA WITH EXACERBATION: ICD-10-CM

## 2023-01-04 DIAGNOSIS — M25.511 ACUTE PAIN OF RIGHT SHOULDER: ICD-10-CM

## 2023-01-04 LAB — HBA1C MFR BLD: 5.6 % (ref 4.8–5.9)

## 2023-01-04 PROCEDURE — 3077F SYST BP >= 140 MM HG: CPT | Performed by: INTERNAL MEDICINE

## 2023-01-04 PROCEDURE — 99214 OFFICE O/P EST MOD 30 MIN: CPT | Performed by: INTERNAL MEDICINE

## 2023-01-04 PROCEDURE — 3080F DIAST BP >= 90 MM HG: CPT | Performed by: INTERNAL MEDICINE

## 2023-01-04 RX ORDER — IBUPROFEN 800 MG/1
800 TABLET ORAL 2 TIMES DAILY PRN
Qty: 120 TABLET | Refills: 1 | Status: SHIPPED | OUTPATIENT
Start: 2023-01-04 | End: 2023-02-03

## 2023-01-04 ASSESSMENT — ENCOUNTER SYMPTOMS
DIARRHEA: 0
EYE PAIN: 0
VOMITING: 0
COLOR CHANGE: 0
WHEEZING: 0
BLOOD IN STOOL: 0
ABDOMINAL DISTENTION: 0
SINUS PRESSURE: 0
VOICE CHANGE: 0
SHORTNESS OF BREATH: 0
COUGH: 0
BACK PAIN: 0
SORE THROAT: 0
FACIAL SWELLING: 0
ABDOMINAL PAIN: 0
EYE REDNESS: 0
CONSTIPATION: 0
TROUBLE SWALLOWING: 0
SINUS PAIN: 0
RECTAL PAIN: 0
NAUSEA: 0
EYE DISCHARGE: 0
EYE ITCHING: 0
APNEA: 0
RHINORRHEA: 0
PHOTOPHOBIA: 0
CHEST TIGHTNESS: 0

## 2023-01-04 ASSESSMENT — PATIENT HEALTH QUESTIONNAIRE - PHQ9
SUM OF ALL RESPONSES TO PHQ9 QUESTIONS 1 & 2: 0
2. FEELING DOWN, DEPRESSED OR HOPELESS: 0
SUM OF ALL RESPONSES TO PHQ QUESTIONS 1-9: 0
1. LITTLE INTEREST OR PLEASURE IN DOING THINGS: 0

## 2023-01-04 NOTE — PROGRESS NOTES
Subjective:      Patient ID: Priscilla Haile is a 62 y.o. female Established patient, here for evaluation of the following chief complaint(s):  Chief Complaint   Patient presents with    Check-Up       HPI  27-year-old hypertensive hyperlipidemic prediabetic presents to establish continuity with me as her primary care doctor    Hypertension, unspecified type-norvasc 5 mg daily     Hyperlipidemia, unspecified hyperlipidemia type-pravachol 40 mg daily       Prediabetes: The patient had steroid-induced diabetes in the past with an A1c that exceeded 6.5. However her most recent readings have all been in the prediabetic range. Right shoulder injury: The patient reports right shoulder pain that is characterized as achy, nonradiating and began after lifting a heavy patient at work. At present he denies polyuria,  Polydipsia, constitutional, sinus, visual, cardiopulmonary, urologic, gastrointestinal, immunologic/hematologic, musculoskeletal, neurologic,dermatologic, or psychiatric complaints.             Current Outpatient Medications on File Prior to Visit   Medication Sig Dispense Refill    fluticasone-umeclidin-vilant (TRELEGY ELLIPTA) 200-62.5-25 MCG/ACT AEPB inhaler Inhale 1 puff into the lungs daily 2 each 0    FASENRA PEN 30 MG/ML SOAJ       Budeson-Glycopyrrol-Formoterol 160-9-4.8 MCG/ACT AERO Inhale 2 puffs into the lungs 2 times daily 1 each 3    montelukast (SINGULAIR) 10 MG tablet TAKE 1 TABLET BY MOUTH EVERY NIGHT AT BEDTIME 30 tablet 3    albuterol (PROVENTIL) (2.5 MG/3ML) 0.083% nebulizer solution USE 1 VIAL VIA NEBULIZER EVERY 6 HOURS AS NEEDED 450 mL 3    albuterol sulfate HFA (PROVENTIL;VENTOLIN;PROAIR) 108 (90 Base) MCG/ACT inhaler INHALE TWO PUFFS BY MOUTH FOUR TIMES A DAY AS NEEDED FOR WHEEZING 8.5 g 2    ondansetron (ZOFRAN) 4 MG tablet Take 1 tablet by mouth every 12 hours as needed for Nausea or Vomiting 30 tablet 3    fluticasone (FLONASE) 50 MCG/ACT nasal spray 1 spray by Nasal route daily 1 each 3    azelastine (ASTELIN) 0.1 % nasal spray 1 spray by Nasal route 2 times daily Use in each nostril as directed 1 each 2    folic acid (FOLVITE) 817 MCG tablet TAKE ONE TABLET BY MOUTH EVERY DAY 90 tablet 1    amLODIPine (NORVASC) 5 MG tablet TAKE ONE TABLET BY MOUTH EVERY DAY 90 tablet 0    pravastatin (PRAVACHOL) 40 MG tablet TAKE ONE TABLET BY MOUTH EVERY DAY 90 tablet 1    vitamin B-12 (CYANOCOBALAMIN) 100 MCG tablet TAKE ONE TABLET BY MOUTH EVERY DAY 30 tablet 0    pantoprazole (PROTONIX) 20 MG tablet Take 1 tablet by mouth daily 30 tablet 3    polyethylene glycol (GLYCOLAX) 17 GM/SCOOP powder USE 17GMS AS DIRECTED ONCE DAILY AS NEEDED FOR CONSTIPATION 765 g 5    loratadine (CLARITIN) 10 MG tablet Take 1 tablet by mouth daily 30 tablet 3    ferrous sulfate (FE TABS) 325 (65 Fe) MG EC tablet Take 1 tablet by mouth every morning (before breakfast) 30 tablet 3    Respiratory Therapy Supplies (NEBULIZER/TUBING/MOUTHPIECE) KIT 1 kit by Does not apply route daily as needed (use as directed) 1 kit 0    Spacer/Aero-Holding Chambers (E-Z SPACER) GASTON Us as directed for asthma 1 Device 0     No current facility-administered medications on file prior to visit. Bacitracin, Butalbital-aspirin-caffeine, Codeine, Floxin [ofloxacin], Rocephin [ceftriaxone sodium], Tomato, Asa [aspirin], Lisinopril, and Azithromycin  Past Medical History:   Diagnosis Date    Allergic rhinitis     Anxiety disorder     Cleft lip     Divergent strabismus     Diverticulitis 2013    s/p partial colectomy, Dr Fatou Valencia    Hyperlipidemia LDL goal <100     Hypertension     Obesity (BMI 30-39. 9)     Severe persistent asthma without complication     intubated x 2013    Type 2 diabetes mellitus ([facility] [address] 2020    A1C 6.8     Past Surgical History:   Procedure Laterality Date     SECTION      x 3    CLEFT LIP REPAIR      OTHER SURGICAL HISTORY  13    Exploratory laparotomy with sigmoid colectomy, drainage pelvic abscess and formation end descending colostomy    OTHER SURGICAL HISTORY  3/20/14    Exploratory lapartomy with extensived lysis of adhesions, takedown of colostmy with primary stapled anastomosis, rigid sigmoidoscopy     Social History     Socioeconomic History    Marital status:      Spouse name: Not on file    Number of children: 1    Years of education: Not on file    Highest education level: Not on file   Occupational History    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[employer]    Tobacco Use    Smoking status: Never    Smokeless tobacco: Never   Vaping Use    Vaping Use: Never used   Substance and Sexual Activity    Alcohol use: No     Alcohol/week: 0.0 standard drinks     Comment: social    Drug use: No    Sexual activity: Not on file   Other Topics Concern    Not on file   Social History Narrative    Born in [facility], one of 3     Dec 30, 2015 to a [place]    3 children on [facility]    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    Hobbies cooking, family, bowling     Social Determinants of Health     Financial Resource Strain: High Risk    Difficulty of Paying Living Expenses: Hard   Food Insecurity: Food Insecurity Present    Worried About [address] in the Last Year: Sometimes true    [address] in the Last Year: Sometimes true   Transportation Needs: Not on file   Physical Activity: Not on file   Stress: Not on file   Social Connections: Not on file   Intimate Partner Violence: Not on file   Housing Stability: Not on file     Family History   Problem Relation Age of Onset    Heart Failure Mother         dec age    Diabetes Mother     Cancer Sister         pancreas, dec age 40         Review of Systems   Constitutional:  Negative for chills, diaphoresis, fatigue and fever.    HENT:  Negative for congestion, dental problem, drooling, ear discharge, ear pain, facial swelling, hearing loss, mouth sores, nosebleeds, postnasal drip, rhinorrhea, sinus pressure, sinus pain, sneezing, sore throat, tinnitus, trouble swallowing and voice change. Eyes:  Negative for photophobia, pain, discharge, redness, itching and visual disturbance. Respiratory:  Negative for apnea, cough, chest tightness, shortness of breath and wheezing. Cardiovascular:  Negative for chest pain, palpitations and leg swelling. Gastrointestinal:  Negative for abdominal distention, abdominal pain, blood in stool, constipation, diarrhea, nausea, rectal pain and vomiting. Endocrine: Negative for cold intolerance, heat intolerance, polydipsia, polyphagia and polyuria. Genitourinary:  Negative for decreased urine volume, difficulty urinating, dysuria, flank pain, frequency, genital sores, hematuria and urgency. Musculoskeletal:  Negative for arthralgias, back pain, gait problem, joint swelling, myalgias, neck pain and neck stiffness. Right shoulder pain   Skin:  Negative for color change, rash and wound. Allergic/Immunologic: Negative for environmental allergies and food allergies. Neurological:  Negative for dizziness, tremors, seizures, syncope, facial asymmetry, speech difficulty, weakness, light-headedness, numbness and headaches. Hematological:  Negative for adenopathy. Does not bruise/bleed easily. Psychiatric/Behavioral:  Negative for agitation, confusion, decreased concentration, hallucinations, self-injury, sleep disturbance and suicidal ideas. The patient is not nervous/anxious. Objective:   BP (!) 163/107   Pulse 86   Resp 16   Ht 5' 5\" (1.651 m)   Wt 183 lb (83 kg)   SpO2 98%   BMI 30.45 kg/m²     Physical Exam  Constitutional:       General: She is not in acute distress. Appearance: She is well-developed. HENT:      Head: Normocephalic. Right Ear: External ear normal.      Left Ear: External ear normal.   Eyes:      Conjunctiva/sclera: Conjunctivae normal.   Neck:      Vascular: No JVD. Trachea: No tracheal deviation. Cardiovascular:      Rate and Rhythm: Normal rate and regular rhythm. Heart sounds: Normal heart sounds. Pulmonary:      Effort: Pulmonary effort is normal. No respiratory distress. Breath sounds: Normal breath sounds. No wheezing or rales. Chest:      Chest wall: No tenderness. Abdominal:      General: Bowel sounds are normal. There is no distension. Palpations: Abdomen is soft. There is no mass. Tenderness: There is no abdominal tenderness. There is no guarding or rebound. Musculoskeletal:         General: No tenderness or deformity. Cervical back: Neck supple. Skin:     General: Skin is warm and dry. Coloration: Skin is not pale. Findings: No erythema or rash. Neurological:      Mental Status: She is alert and oriented to person, place, and time. Motor: No abnormal muscle tone. Psychiatric:         Thought Content: Thought content normal.         Judgment: Judgment normal.       Assessment:       Diagnosis Orders   1. Hypertension, unspecified type        2. Hyperlipidemia, unspecified hyperlipidemia type        3. Moderate persistent asthma with exacerbation        4. Acute pain of right shoulder  [facility]      5. Pre-diabetes  Hemoglobin A1C           No results found for: LIPIDPAN, BMP, CMP, CBC, CBCAUTODIF  Plan:      Donold Curling was seen today for check-up. Diagnoses and all orders for this visit:    Hypertension, unspecified type-Norvasc 5 mg orally daily    Hyperlipidemia, unspecified hyperlipidemia type-Pravachol 40 mg orally daily    Moderate persistent asthma with exacerbation-albuterol, Singulair. Provided the patient with an albuterol nebulizer    Acute pain of right shoulder  -     [facility]    Pre-diabetes  -     Hemoglobin A1C; Future    Other orders  -     Cancel: POCT glycosylated hemoglobin (Hb A1C)  -     ibuprofen (ADVIL;MOTRIN) 800 MG tablet;  Take 1 tablet by mouth 2 times daily as needed for Pain       Return in about 9 weeks (around 3/8/2023). On this date 01/04/23 I have spent 30 minutes reviewing previous notes, test results and face to face with the patient discussing the diagnosis and importance of compliance with the treatment plan. Almita Madrid MD    Please note, this report has been partially produced using speech recognition software  and may cause  and /or contain errors related to that system including grammar, punctuation and spelling as well as words and phrases that may seem inappropriate. If there are questions or concerns please feel free to contact me to clarify.

## 2023-01-06 DIAGNOSIS — J45.50 SEVERE PERSISTENT ASTHMA WITHOUT COMPLICATION: Primary | ICD-10-CM

## 2023-01-06 RX ORDER — BUDESONIDE, GLYCOPYRROLATE, AND FORMOTEROL FUMARATE 160; 9; 4.8 UG/1; UG/1; UG/1
2 AEROSOL, METERED RESPIRATORY (INHALATION) 2 TIMES DAILY
Qty: 1 EACH | Refills: 3 | Status: SHIPPED | OUTPATIENT
Start: 2023-01-06 | End: 2023-02-05

## 2023-01-06 NOTE — TELEPHONE ENCOUNTER
Rx requested:  Requested Prescriptions     Pending Prescriptions Disp Refills    Budeson-Glycopyrrol-Formoterol (BREZTRI AEROSPHERE) 160-9-4.8 MCG/ACT AERO 1 each 3     Sig: Inhale 2 puffs into the lungs 2 times daily       Last Office Visit:   11/21/2022      Next Visit Date:  Future Appointments   Date Time Provider Gela Ryanne   1/16/2023 11:15 AM SCHEDULE, IRAIS ALICEA PCP [facility]   1/16/2023  3:15 PM Markus Hood MD [address]   3/13/2023 11:15 AM Anita Hurley  [place], [address] [FreeTextEntry1] : No objection to starting chemotherapy.\par Expect improvement of symptoms with treatment.\par May follow up as clinically indicated.

## 2023-01-09 DIAGNOSIS — J40 BRONCHITIS: Primary | ICD-10-CM

## 2023-01-09 DIAGNOSIS — R05.9 COUGH, UNSPECIFIED TYPE: ICD-10-CM

## 2023-01-09 RX ORDER — AZITHROMYCIN 250 MG/1
250 TABLET, FILM COATED ORAL SEE ADMIN INSTRUCTIONS
Qty: 6 TABLET | Refills: 0 | Status: SHIPPED | OUTPATIENT
Start: 2023-01-09 | End: 2023-01-14

## 2023-02-10 ENCOUNTER — OFFICE VISIT (OUTPATIENT)
Dept: FAMILY MEDICINE CLINIC | Age: 58
End: 2023-02-10
Payer: COMMERCIAL

## 2023-02-10 ENCOUNTER — TELEPHONE (OUTPATIENT)
Dept: FAMILY MEDICINE CLINIC | Age: 58
End: 2023-02-10

## 2023-02-10 VITALS
HEART RATE: 88 BPM | DIASTOLIC BLOOD PRESSURE: 84 MMHG | OXYGEN SATURATION: 99 % | HEIGHT: 65 IN | BODY MASS INDEX: 30.32 KG/M2 | WEIGHT: 182 LBS | SYSTOLIC BLOOD PRESSURE: 138 MMHG

## 2023-02-10 DIAGNOSIS — E11.9 TYPE 2 DIABETES MELLITUS WITHOUT COMPLICATION, WITHOUT LONG-TERM CURRENT USE OF INSULIN (HCC): Primary | ICD-10-CM

## 2023-02-10 DIAGNOSIS — K59.00 CONSTIPATION, UNSPECIFIED CONSTIPATION TYPE: ICD-10-CM

## 2023-02-10 DIAGNOSIS — I10 ESSENTIAL HYPERTENSION: ICD-10-CM

## 2023-02-10 DIAGNOSIS — J30.2 SEASONAL ALLERGIES: ICD-10-CM

## 2023-02-10 DIAGNOSIS — J45.41 MODERATE PERSISTENT ASTHMA WITH EXACERBATION: ICD-10-CM

## 2023-02-10 DIAGNOSIS — I10 HYPERTENSION, UNSPECIFIED TYPE: ICD-10-CM

## 2023-02-10 PROCEDURE — 99214 OFFICE O/P EST MOD 30 MIN: CPT | Performed by: INTERNAL MEDICINE

## 2023-02-10 PROCEDURE — 3075F SYST BP GE 130 - 139MM HG: CPT | Performed by: INTERNAL MEDICINE

## 2023-02-10 PROCEDURE — 3079F DIAST BP 80-89 MM HG: CPT | Performed by: INTERNAL MEDICINE

## 2023-02-10 PROCEDURE — 3044F HG A1C LEVEL LT 7.0%: CPT | Performed by: INTERNAL MEDICINE

## 2023-02-10 RX ORDER — AMLODIPINE BESYLATE 10 MG/1
10 TABLET ORAL DAILY
Qty: 30 TABLET | Refills: 3 | Status: SHIPPED | OUTPATIENT
Start: 2023-02-10

## 2023-02-10 RX ORDER — MONTELUKAST SODIUM 10 MG/1
TABLET ORAL
Qty: 30 TABLET | Refills: 3 | Status: SHIPPED | OUTPATIENT
Start: 2023-02-10

## 2023-02-10 RX ORDER — AMLODIPINE BESYLATE 5 MG/1
TABLET ORAL
Qty: 90 TABLET | Refills: 0 | Status: SHIPPED | OUTPATIENT
Start: 2023-02-10 | End: 2023-02-12

## 2023-02-10 SDOH — ECONOMIC STABILITY: HOUSING INSECURITY
IN THE LAST 12 MONTHS, WAS THERE A TIME WHEN YOU DID NOT HAVE A STEADY PLACE TO SLEEP OR SLEPT IN A SHELTER (INCLUDING NOW)?: NO

## 2023-02-10 SDOH — ECONOMIC STABILITY: FOOD INSECURITY: WITHIN THE PAST 12 MONTHS, THE FOOD YOU BOUGHT JUST DIDN'T LAST AND YOU DIDN'T HAVE MONEY TO GET MORE.: NEVER TRUE

## 2023-02-10 SDOH — ECONOMIC STABILITY: INCOME INSECURITY: HOW HARD IS IT FOR YOU TO PAY FOR THE VERY BASICS LIKE FOOD, HOUSING, MEDICAL CARE, AND HEATING?: NOT HARD AT ALL

## 2023-02-10 SDOH — ECONOMIC STABILITY: FOOD INSECURITY: WITHIN THE PAST 12 MONTHS, YOU WORRIED THAT YOUR FOOD WOULD RUN OUT BEFORE YOU GOT MONEY TO BUY MORE.: NEVER TRUE

## 2023-02-10 ASSESSMENT — ENCOUNTER SYMPTOMS
EYE ITCHING: 0
COUGH: 0
EYE REDNESS: 0
CONSTIPATION: 0
VOMITING: 0
VOICE CHANGE: 0
ABDOMINAL DISTENTION: 0
APNEA: 0
WHEEZING: 0
RHINORRHEA: 0
SORE THROAT: 0
DIARRHEA: 0
ABDOMINAL PAIN: 0
BACK PAIN: 0
PHOTOPHOBIA: 0
EYE PAIN: 0
EYE DISCHARGE: 0
SINUS PRESSURE: 0
SHORTNESS OF BREATH: 0
FACIAL SWELLING: 0
SINUS PAIN: 0
CHEST TIGHTNESS: 0
RECTAL PAIN: 0
TROUBLE SWALLOWING: 0
NAUSEA: 0
COLOR CHANGE: 0
BLOOD IN STOOL: 0

## 2023-02-10 NOTE — TELEPHONE ENCOUNTER
[facility] states they received two different scripts today for Amlodipine, one for 10 mg and the other for 5 mg. They are inquiring both should be filled or one or the other. [facility] [phone]    Please advise. Thank you.

## 2023-02-10 NOTE — PROGRESS NOTES
Subjective:      Patient ID: Dionicio Bundy is a 62 y.o. female Established patient, here for evaluation of the following chief complaint(s):  Chief Complaint   Patient presents with    Follow-up    Hypertension    Eye Burn     Watery, irritate x 2 weeks         HPI  42-year-old hypertensive hyperlipidemic prediabetic presents to establish continuity with me as her primary care doctor    Hypertension, unspecified type-norvasc 5 mg daily       Hyperlipidemia, unspecified hyperlipidemia type-pravachol 40 mg daily       Prediabetes: The patient had steroid-induced diabetes in the past with an A1c that exceeded 6.5. However her most recent readings have all been in the prediabetic range. Hm : due for colonoscopy  At present he denies polyuria,  Polydipsia, constitutional, sinus, visual, cardiopulmonary, urologic, gastrointestinal, immunologic/hematologic, musculoskeletal, neurologic,dermatologic, or psychiatric complaints.             Current Outpatient Medications on File Prior to Visit   Medication Sig Dispense Refill    ibuprofen (ADVIL;MOTRIN) 800 MG tablet Take 1 tablet by mouth 2 times daily as needed for Pain 120 tablet 1    FASENRA PEN 30 MG/ML SOAJ       albuterol (PROVENTIL) (2.5 MG/3ML) 0.083% nebulizer solution USE 1 VIAL VIA NEBULIZER EVERY 6 HOURS AS NEEDED 450 mL 3    albuterol sulfate HFA (PROVENTIL;VENTOLIN;PROAIR) 108 (90 Base) MCG/ACT inhaler INHALE TWO PUFFS BY MOUTH FOUR TIMES A DAY AS NEEDED FOR WHEEZING 8.5 g 2    ondansetron (ZOFRAN) 4 MG tablet Take 1 tablet by mouth every 12 hours as needed for Nausea or Vomiting 30 tablet 3    fluticasone (FLONASE) 50 MCG/ACT nasal spray 1 spray by Nasal route daily 1 each 3    azelastine (ASTELIN) 0.1 % nasal spray 1 spray by Nasal route 2 times daily Use in each nostril as directed 1 each 2    folic acid (FOLVITE) 685 MCG tablet TAKE ONE TABLET BY MOUTH EVERY DAY 90 tablet 1    pravastatin (PRAVACHOL) 40 MG tablet TAKE ONE TABLET BY MOUTH EVERY DAY 90 tablet 1    vitamin B-12 (CYANOCOBALAMIN) 100 MCG tablet TAKE ONE TABLET BY MOUTH EVERY DAY 30 tablet 0    pantoprazole (PROTONIX) 20 MG tablet Take 1 tablet by mouth daily 30 tablet 3    polyethylene glycol (GLYCOLAX) 17 GM/SCOOP powder USE 17GMS AS DIRECTED ONCE DAILY AS NEEDED FOR CONSTIPATION 765 g 5    loratadine (CLARITIN) 10 MG tablet Take 1 tablet by mouth daily 30 tablet 3    ferrous sulfate (FE TABS) 325 (65 Fe) MG EC tablet Take 1 tablet by mouth every morning (before breakfast) 30 tablet 3    Respiratory Therapy Supplies (NEBULIZER/TUBING/MOUTHPIECE) KIT 1 kit by Does not apply route daily as needed (use as directed) 1 kit 0    Spacer/Aero-Holding Chambers (E-Z SPACER) GASTON Us as directed for asthma 1 Device 0     No current facility-administered medications on file prior to visit. Bacitracin, Butalbital-aspirin-caffeine, Codeine, Floxin [ofloxacin], Rocephin [ceftriaxone sodium], Tomato, Asa [aspirin], Lisinopril, and Azithromycin  Past Medical History:   Diagnosis Date    Allergic rhinitis     Anxiety disorder     Cleft lip     Divergent strabismus     Diverticulitis 2013    s/p partial colectomy, Dr Miranda Herzog    Hyperlipidemia LDL goal <100     Hypertension     Obesity (BMI 30-39. 9)     Severe persistent asthma without complication     intubated x 2013    Type 2 diabetes mellitus ([facility] [address] 2020    A1C 6.8     Past Surgical History:   Procedure Laterality Date     SECTION      x 3    CLEFT LIP REPAIR      OTHER SURGICAL HISTORY  13    Exploratory laparotomy with sigmoid colectomy, drainage pelvic abscess and formation end descending colostomy    OTHER SURGICAL HISTORY  3/20/14    Exploratory lapartomy with extensived lysis of adhesions, takedown of colostmy with primary stapled anastomosis, rigid sigmoidoscopy     Social History     Socioeconomic History    Marital status:      Spouse name: Not on file    Number of children: 1    Years of education: Not on file Highest education level: Not on file   Occupational History    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[employer]    Tobacco Use    Smoking status: Never    Smokeless tobacco: Never   Vaping Use    Vaping Use: Never used   Substance and Sexual Activity    Alcohol use: No     Alcohol/week: 0.0 standard drinks     Comment: social    Drug use: No    Sexual activity: Not on file   Other Topics Concern    Not on file   Social History Narrative    Born in [facility], one of 3     Dec 30, 2015 to a [place]    3 children on [facility]    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    Hobbies cooking, family, bowling     Social Determinants of Health     Financial Resource Strain: Low Risk     Difficulty of Paying Living Expenses: Not hard at [facility] Insecurity: No Food Insecurity    Worried About [address] [employer] in the Last Year: Never true    [address] in the Last Year: Never true   Transportation Needs: Unknown    Lack of Transportation (Medical): Not on file    Lack of Transportation (Non-Medical): No   Physical Activity: Not on file   Stress: Not on file   Social Connections: Not on file   Intimate Partner Violence: Not on file   Housing Stability: Unknown    Unable to Pay for Housing in the Last Year: Not on file    Number of Places Lived in the Last Year: Not on file    Unstable Housing in the Last Year: No     Family History   Problem Relation Age of Onset    Heart Failure Mother         dec age    Diabetes Mother     Cancer Sister         pancreas, dec age 40         Review of Systems   Constitutional:  Negative for chills, diaphoresis, fatigue and fever. HENT:  Negative for congestion, dental problem, drooling, ear discharge, ear pain, facial swelling, hearing loss, mouth sores, nosebleeds, postnasal drip, rhinorrhea, sinus pressure, sinus pain, sneezing, sore throat, tinnitus, trouble swallowing and voice change.     Eyes:  Negative for photophobia, pain, discharge, redness, itching and visual disturbance. Respiratory:  Negative for apnea, cough, chest tightness, shortness of breath and wheezing. Cardiovascular:  Negative for chest pain, palpitations and leg swelling. Gastrointestinal:  Negative for abdominal distention, abdominal pain, blood in stool, constipation, diarrhea, nausea, rectal pain and vomiting. Endocrine: Negative for cold intolerance, heat intolerance, polydipsia, polyphagia and polyuria. Genitourinary:  Negative for decreased urine volume, difficulty urinating, dysuria, flank pain, frequency, genital sores, hematuria and urgency. Musculoskeletal:  Negative for arthralgias, back pain, gait problem, joint swelling, myalgias, neck pain and neck stiffness. Right shoulder pain   Skin:  Negative for color change, rash and wound. Allergic/Immunologic: Negative for environmental allergies and food allergies. Neurological:  Negative for dizziness, tremors, seizures, syncope, facial asymmetry, speech difficulty, weakness, light-headedness, numbness and headaches. Hematological:  Negative for adenopathy. Does not bruise/bleed easily. Psychiatric/Behavioral:  Negative for agitation, confusion, decreased concentration, hallucinations, self-injury, sleep disturbance and suicidal ideas. The patient is not nervous/anxious. Objective:   /84 (Site: Right Upper Arm, Position: Standing, Cuff Size: Medium Adult)   Pulse 88   Ht 5' 5\" (1.651 m)   Wt 182 lb (82.6 kg)   SpO2 99%   BMI 30.29 kg/m²     Physical Exam  Constitutional:       General: She is not in acute distress. Appearance: She is well-developed. HENT:      Head: Normocephalic. Right Ear: External ear normal.      Left Ear: External ear normal.   Eyes:      Conjunctiva/sclera: Conjunctivae normal.   Neck:      Vascular: No JVD. Trachea: No tracheal deviation. Cardiovascular:      Rate and Rhythm: Normal rate and regular rhythm.       Heart sounds: Normal heart sounds. Pulmonary:      Effort: Pulmonary effort is normal. No respiratory distress. Breath sounds: Normal breath sounds. No wheezing or rales. Chest:      Chest wall: No tenderness. Abdominal:      General: Bowel sounds are normal. There is no distension. Palpations: Abdomen is soft. There is no mass. Tenderness: There is no abdominal tenderness. There is no guarding or rebound. Musculoskeletal:         General: No tenderness or deformity. Cervical back: Neck supple. Skin:     General: Skin is warm and dry. Coloration: Skin is not pale. Findings: No erythema or rash. Neurological:      Mental Status: She is alert and oriented to person, place, and time. Motor: No abnormal muscle tone. Psychiatric:         Thought Content: Thought content normal.         Judgment: Judgment normal.       Assessment:       Diagnosis Orders   1. Type 2 diabetes mellitus without complication, without long-term current use of insulin ([facility] [address]        2. Hypertension, unspecified type  Bella Nguyen MD, Gastroenterology, [place]      3. Essential hypertension  amLODIPine (NORVASC) 5 MG tablet      4. Seasonal allergies  montelukast (SINGULAIR) 10 MG tablet      5. Constipation, unspecified constipation type        6. Moderate persistent asthma with exacerbation               No results found for: LIPIDPAN, BMP, CMP, CBC, CBCAUTODIF  Plan:      Albina Closs was seen today for check-up. Diagnoses and all orders for this visit:    Hypertension, unspecified type - increase Norvasc 10 mg orally daily    Hyperlipidemia, unspecified hyperlipidemia type-Pravachol 40 mg orally daily    Moderate persistent asthma with exacerbation-albuterol, Singulair. Provided the patient with an albuterol nebulizer             HM : REFREED FOR A COLONOSCOPY  Return in about 3 months (around 5/10/2023).       On this date 02/10/23 I have spent 30 minutes reviewing previous notes, test results and face to face with the patient discussing the diagnosis and importance of compliance with the treatment plan. Chana Kong MD    Please note, this report has been partially produced using speech recognition software  and may cause  and /or contain errors related to that system including grammar, punctuation and spelling as well as words and phrases that may seem inappropriate. If there are questions or concerns please feel free to contact me to clarify.

## 2023-02-14 ENCOUNTER — OFFICE VISIT (OUTPATIENT)
Dept: PULMONOLOGY | Age: 58
End: 2023-02-14
Payer: COMMERCIAL

## 2023-02-14 VITALS
SYSTOLIC BLOOD PRESSURE: 136 MMHG | HEIGHT: 65 IN | WEIGHT: 179.8 LBS | OXYGEN SATURATION: 99 % | BODY MASS INDEX: 29.96 KG/M2 | DIASTOLIC BLOOD PRESSURE: 88 MMHG | HEART RATE: 86 BPM | TEMPERATURE: 96.8 F | RESPIRATION RATE: 16 BRPM

## 2023-02-14 DIAGNOSIS — K44.9 HIATAL HERNIA: ICD-10-CM

## 2023-02-14 DIAGNOSIS — K21.9 GASTROESOPHAGEAL REFLUX DISEASE WITHOUT ESOPHAGITIS: ICD-10-CM

## 2023-02-14 DIAGNOSIS — J45.50 SEVERE PERSISTENT ASTHMA WITHOUT COMPLICATION: Primary | ICD-10-CM

## 2023-02-14 DIAGNOSIS — J30.2 SEASONAL ALLERGIES: ICD-10-CM

## 2023-02-14 PROCEDURE — 3079F DIAST BP 80-89 MM HG: CPT | Performed by: INTERNAL MEDICINE

## 2023-02-14 PROCEDURE — 99214 OFFICE O/P EST MOD 30 MIN: CPT | Performed by: INTERNAL MEDICINE

## 2023-02-14 PROCEDURE — 3075F SYST BP GE 130 - 139MM HG: CPT | Performed by: INTERNAL MEDICINE

## 2023-02-14 RX ORDER — MONTELUKAST SODIUM 10 MG/1
TABLET ORAL
Qty: 30 TABLET | Refills: 3 | Status: SHIPPED | OUTPATIENT
Start: 2023-02-14

## 2023-02-14 RX ORDER — FLUTICASONE PROPIONATE 50 MCG
1 SPRAY, SUSPENSION (ML) NASAL DAILY
Qty: 1 EACH | Refills: 3 | Status: SHIPPED | OUTPATIENT
Start: 2023-02-14

## 2023-02-14 NOTE — PROGRESS NOTES
Subjective:     Lisa Molina is a 62 y.o. female who complains today of:     Chief Complaint   Patient presents with    Follow-up     3 Month F/U for Severe persistent asthma        HPI  Patient presents for asthma    Doing good, symptoms controlled, no coughing, no chest pain, she has some nasal congestion and mild postnasal drip, no chest pain, no lower extremity edema, no fever no chills, she is dieting and trying to lose weight, she report occasional heartburn, she has hiatal hernia. Asthma symptoms controlled currently on Fasenra, Breztri, and Singulair, she has not been using her rescue inhaler, no nighttime symptoms. Allergies:  Bacitracin, Butalbital-aspirin-caffeine, Codeine, Floxin [ofloxacin], Rocephin [ceftriaxone sodium], Tomato, Asa [aspirin], Lisinopril, and Azithromycin  Past Medical History:   Diagnosis Date    Allergic rhinitis     Anxiety disorder     Cleft lip     Divergent strabismus     Diverticulitis 2013    s/p partial colectomy, Dr Hansa Cervantes    Hyperlipidemia LDL goal <100     Hypertension     Obesity (BMI 30-39. 9)     Severe persistent asthma without complication     intubated x 2013    Type 2 diabetes mellitus ([facility] [address] 2020    A1C 6.8     Past Surgical History:   Procedure Laterality Date     SECTION      x 3    CLEFT LIP REPAIR      OTHER SURGICAL HISTORY  13    Exploratory laparotomy with sigmoid colectomy, drainage pelvic abscess and formation end descending colostomy    OTHER SURGICAL HISTORY  3/20/14    Exploratory lapartomy with extensived lysis of adhesions, takedown of colostmy with primary stapled anastomosis, rigid sigmoidoscopy     Family History   Problem Relation Age of Onset    Heart Failure Mother         dec age    Diabetes Mother     Cancer Sister         pancreas, dec age 40     Social History     Socioeconomic History    Marital status:      Spouse name: Not on file    Number of children: 1    Years of education: Not on file Highest education level: Not on file   Occupational History    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[employer]    Tobacco Use    Smoking status: Never    Smokeless tobacco: Never   Vaping Use    Vaping Use: Never used   Substance and Sexual Activity    Alcohol use: No     Alcohol/week: 0.0 standard drinks     Comment: social    Drug use: No    Sexual activity: Not on file   Other Topics Concern    Not on file   Social History Narrative    Born in [facility], one of 3     Dec 30, 2015 to a [place]    3 children on [address]     Works in [employer] [address] 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[place] [facility] 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 and grandson    Hobbies cooking, family, bowling     Social Determinants of Health     Financial Resource Strain: Low Risk     Difficulty of Paying Living Expenses: Not hard at [facility] Insecurity: No Food Insecurity    Worried About [address] in the Last Year: Never true    [address] in the Last Year: Never true   Transportation Needs: Unknown    Lack of Transportation (Medical): Not on file    Lack of Transportation (Non-Medical): No   Physical Activity: Not on file   Stress: Not on file   Social Connections: Not on file   Intimate Partner Violence: Not on file   Housing Stability: Unknown    Unable to Pay for Housing in the Last Year: Not on file    Number of Places Lived in the Last Year: Not on file    Unstable Housing in the Last Year: No         Review of Systems      ROS: 10 organs review of system is done including general, psychological, ENT, hematological, endocrine, respiratory, cardiovascular, gastrointestinal,musculoskeletal, neurological,  allergy and Immunology is done and is otherwise negative.     Current Outpatient Medications   Medication Sig Dispense Refill    montelukast (SINGULAIR) 10 MG tablet TAKE 1 TABLET BY MOUTH EVERY NIGHT AT BEDTIME 30 tablet 3    ALPRAZolam (XANAX) 0.5 MG tablet TAKE ONE TABLET BY MOUTH ONCE NIGHTLY AS NEEDED FOR SLEEP OR ANXIETY 7 tablet 0    amLODIPine (NORVASC) 10 MG tablet Take 1 tablet by mouth daily 30 tablet 3    FASENRA PEN 30 MG/ML SOAJ       albuterol (PROVENTIL) (2.5 MG/3ML) 0.083% nebulizer solution USE 1 VIAL VIA NEBULIZER EVERY 6 HOURS AS NEEDED 450 mL 3    albuterol sulfate HFA (PROVENTIL;VENTOLIN;PROAIR) 108 (90 Base) MCG/ACT inhaler INHALE TWO PUFFS BY MOUTH FOUR TIMES A DAY AS NEEDED FOR WHEEZING 8.5 g 2    ondansetron (ZOFRAN) 4 MG tablet Take 1 tablet by mouth every 12 hours as needed for Nausea or Vomiting 30 tablet 3    fluticasone (FLONASE) 50 MCG/ACT nasal spray 1 spray by Nasal route daily 1 each 3    azelastine (ASTELIN) 0.1 % nasal spray 1 spray by Nasal route 2 times daily Use in each nostril as directed 1 each 2    folic acid (FOLVITE) 859 MCG tablet TAKE ONE TABLET BY MOUTH EVERY DAY 90 tablet 1    pravastatin (PRAVACHOL) 40 MG tablet TAKE ONE TABLET BY MOUTH EVERY DAY 90 tablet 1    vitamin B-12 (CYANOCOBALAMIN) 100 MCG tablet TAKE ONE TABLET BY MOUTH EVERY DAY 30 tablet 0    pantoprazole (PROTONIX) 20 MG tablet Take 1 tablet by mouth daily 30 tablet 3    polyethylene glycol (GLYCOLAX) 17 GM/SCOOP powder USE 17GMS AS DIRECTED ONCE DAILY AS NEEDED FOR CONSTIPATION 765 g 5    loratadine (CLARITIN) 10 MG tablet Take 1 tablet by mouth daily 30 tablet 3    ferrous sulfate (FE TABS) 325 (65 Fe) MG EC tablet Take 1 tablet by mouth every morning (before breakfast) 30 tablet 3    ibuprofen (ADVIL;MOTRIN) 800 MG tablet Take 1 tablet by mouth 2 times daily as needed for Pain 120 tablet 1    Respiratory Therapy Supplies (NEBULIZER/TUBING/MOUTHPIECE) KIT 1 kit by Does not apply route daily as needed (use as directed) (Patient not taking: Reported on 2/14/2023) 1 kit 0    Spacer/Aero-Holding Chambers (E-Z SPACER) GASTON Us as directed for asthma (Patient not taking: Reported on 2/14/2023) 1 Device 0     No current facility-administered medications for this visit.        Objective:     Vitals:    02/14/23 1451   BP: 136/88   Site: Left Upper Arm Position: Sitting   Cuff Size: Medium Adult   Pulse: 86   Resp: 16   Temp: 96.8 °F (36 °C)   TempSrc: Infrared   SpO2: 99%   Weight: 179 lb 12.8 oz (81.6 kg)   Height: 5' 5\" (1.651 m)         Physical Exam  Constitutional:       General: She is not in acute distress. Appearance: She is well-developed. She is not diaphoretic. HENT:      Head: Normocephalic and atraumatic. Eyes:      Conjunctiva/sclera: Conjunctivae normal.      Pupils: Pupils are equal, round, and reactive to light. Cardiovascular:      Rate and Rhythm: Normal rate and regular rhythm. Heart sounds: No murmur heard. No friction rub. No gallop. Pulmonary:      Effort: Pulmonary effort is normal. No respiratory distress. Breath sounds: Normal breath sounds. No wheezing or rales. Chest:      Chest wall: No tenderness. Abdominal:      General: There is no distension. Palpations: Abdomen is soft. Tenderness: There is no abdominal tenderness. There is no rebound. Musculoskeletal:         General: No tenderness. Cervical back: Normal range of motion and neck supple. Right lower leg: No edema. Left lower leg: No edema. Lymphadenopathy:      Cervical: No cervical adenopathy. Skin:     General: Skin is warm and dry. Findings: No erythema. Neurological:      Mental Status: She is alert and oriented to person, place, and time. Psychiatric:         Judgment: Judgment normal.       Imaging studies reviewed and interpreted by me chest x-ray January 2023, no mass or infiltrate  Lab results reviewed in chart  PFT February 2021, FEV1 57% FEV1/FVC 0.57   Home sleep study April 2022 negative  Assessment and Plan       Diagnosis Orders   1. Severe persistent asthma without complication        2. Seasonal allergies  montelukast (SINGULAIR) 10 MG tablet    fluticasone (FLONASE) 50 MCG/ACT nasal spray      3. Hiatal hernia  Jeannine Preciado MD, Gastroenterology, [facility]      4.  Gastroesophageal reflux disease without esophagitis          Continue Cornelio Ponds and Singulair  As needed albuterol  Yearly flu shot, COVID-19 vaccination and pneumonia vaccine  Hiatal hernia, with history of GERD, will refer to GI, consider EGD  Flonase for   nasal congestion      Orders Placed This Encounter   Procedures    Maritza Vasquez MD, Gastroenterology, [facility]     Referral Priority:   Routine     Referral Type:   Eval and Treat     Referral Reason:   Specialty Services Required     Referred to Provider:   Deborah Mathew MD     Requested Specialty:   Gastroenterology     Number of Visits Requested:   1     Orders Placed This Encounter   Medications    montelukast (SINGULAIR) 10 MG tablet     Sig: TAKE 1 TABLET BY MOUTH EVERY NIGHT AT BEDTIME     Dispense:  30 tablet     Refill:  3            Discussed with patient the importance of exercise and weight control and  overall health and well-being. Reviewed with the patient: current clinical status, medications, activities and diet. Side effects, adverse effects of the medication prescribed today, as well as treatment plan and result expectations have been discussed with the patient who expresses understanding and desires to proceed. Return in about 6 months (around 8/14/2023). I spent 35 min with this patient, greater the 50% of this time was spent in counseling and/or coordinating of care.       Janey Blair MD

## 2023-03-13 ENCOUNTER — OFFICE VISIT (OUTPATIENT)
Dept: FAMILY MEDICINE CLINIC | Age: 58
End: 2023-03-13
Payer: COMMERCIAL

## 2023-03-13 ENCOUNTER — PREP FOR PROCEDURE (OUTPATIENT)
Dept: GASTROENTEROLOGY | Age: 58
End: 2023-03-13

## 2023-03-13 ENCOUNTER — OFFICE VISIT (OUTPATIENT)
Dept: GASTROENTEROLOGY | Age: 58
End: 2023-03-13
Payer: COMMERCIAL

## 2023-03-13 VITALS
HEIGHT: 65 IN | WEIGHT: 177 LBS | HEART RATE: 92 BPM | SYSTOLIC BLOOD PRESSURE: 124 MMHG | OXYGEN SATURATION: 98 % | BODY MASS INDEX: 29.49 KG/M2 | DIASTOLIC BLOOD PRESSURE: 82 MMHG

## 2023-03-13 VITALS — BODY MASS INDEX: 29.32 KG/M2 | HEIGHT: 65 IN | HEART RATE: 65 BPM | WEIGHT: 176 LBS | OXYGEN SATURATION: 100 %

## 2023-03-13 DIAGNOSIS — K21.9 GASTROESOPHAGEAL REFLUX DISEASE, UNSPECIFIED WHETHER ESOPHAGITIS PRESENT: Primary | ICD-10-CM

## 2023-03-13 DIAGNOSIS — Z12.11 SCREEN FOR COLON CANCER: ICD-10-CM

## 2023-03-13 DIAGNOSIS — E78.5 HYPERLIPIDEMIA, UNSPECIFIED HYPERLIPIDEMIA TYPE: ICD-10-CM

## 2023-03-13 DIAGNOSIS — I10 ESSENTIAL HYPERTENSION: ICD-10-CM

## 2023-03-13 DIAGNOSIS — K44.9 HIATAL HERNIA: ICD-10-CM

## 2023-03-13 DIAGNOSIS — N30.00 ACUTE CYSTITIS WITHOUT HEMATURIA: Primary | ICD-10-CM

## 2023-03-13 DIAGNOSIS — R73.03 PRE-DIABETES: ICD-10-CM

## 2023-03-13 DIAGNOSIS — K59.09 OTHER CONSTIPATION: ICD-10-CM

## 2023-03-13 LAB
BILIRUBIN, POC: NORMAL
BLOOD URINE, POC: NORMAL
CLARITY, POC: CLEAR
COLOR, POC: NORMAL
GLUCOSE URINE, POC: NORMAL
KETONES, POC: NORMAL
LEUKOCYTE EST, POC: NORMAL
NITRITE, POC: NORMAL
PH, POC: 7.5
PROTEIN, POC: NORMAL
SPECIFIC GRAVITY, POC: 1.02
UROBILINOGEN, POC: 1

## 2023-03-13 PROCEDURE — 3079F DIAST BP 80-89 MM HG: CPT | Performed by: INTERNAL MEDICINE

## 2023-03-13 PROCEDURE — 99204 OFFICE O/P NEW MOD 45 MIN: CPT | Performed by: INTERNAL MEDICINE

## 2023-03-13 PROCEDURE — 99214 OFFICE O/P EST MOD 30 MIN: CPT | Performed by: INTERNAL MEDICINE

## 2023-03-13 PROCEDURE — 3074F SYST BP LT 130 MM HG: CPT | Performed by: INTERNAL MEDICINE

## 2023-03-13 PROCEDURE — 81003 URINALYSIS AUTO W/O SCOPE: CPT | Performed by: INTERNAL MEDICINE

## 2023-03-13 RX ORDER — SODIUM PICOSULFATE, MAGNESIUM OXIDE, AND ANHYDROUS CITRIC ACID 10; 3.5; 12 MG/160ML; G/160ML; G/160ML
LIQUID ORAL
Qty: 320 ML | Refills: 0 | Status: SHIPPED | OUTPATIENT
Start: 2023-03-13

## 2023-03-13 RX ORDER — ESCITALOPRAM OXALATE 10 MG/1
10 TABLET ORAL DAILY
Qty: 30 TABLET | Refills: 0 | Status: SHIPPED | OUTPATIENT
Start: 2023-03-13

## 2023-03-13 ASSESSMENT — ENCOUNTER SYMPTOMS
ABDOMINAL PAIN: 0
VOMITING: 0
TROUBLE SWALLOWING: 0
EYE DISCHARGE: 0
ABDOMINAL DISTENTION: 0
WHEEZING: 0
VOICE CHANGE: 0
APNEA: 0
COUGH: 0
EYE PAIN: 0
SHORTNESS OF BREATH: 0
SINUS PRESSURE: 0
DIARRHEA: 0
EYE REDNESS: 0
FACIAL SWELLING: 0
CHEST TIGHTNESS: 0
EYE ITCHING: 0
NAUSEA: 0
BACK PAIN: 0
COLOR CHANGE: 0
PHOTOPHOBIA: 0
SORE THROAT: 0
SINUS PAIN: 0
RECTAL PAIN: 0
CONSTIPATION: 0
BLOOD IN STOOL: 0
RHINORRHEA: 0

## 2023-03-13 NOTE — PROGRESS NOTES
Subjective:      Patient ID: Michelle Vogt is a 62 y.o. female Established patient, here for evaluation of the following chief complaint(s):  Chief Complaint   Patient presents with    Follow-up     Pt presents today for 9 week f/u,also has sore throat and pt thinks she may has uti starting sx are frequency and urgeny,also states she is not sleeping well due to her anxiety. Pt states that she wants xanax to be refilled         HPI  59-year-old hypertensive hyperlipidemic prediabetic presents for follow-up visit. Hypertension, unspecified type-norvasc 5 mg daily       Hyperlipidemia, unspecified hyperlipidemia type-pravachol 40 mg daily       Prediabetes: The patient had steroid-induced diabetes in the past with an A1c that exceeded 6.5. However her most recent readings have all been in the prediabetic range. Dysuria: The patient reports that she has been experiencing dysuria for several days. She is concerned about the possibility of having a urinary tract infection. Hm : due for colonoscopy  At present he denies polyuria,  Polydipsia, constitutional, sinus, visual, cardiopulmonary, urologic, gastrointestinal, immunologic/hematologic, musculoskeletal, neurologic,dermatologic, or psychiatric complaints.             Current Outpatient Medications on File Prior to Visit   Medication Sig Dispense Refill    montelukast (SINGULAIR) 10 MG tablet TAKE 1 TABLET BY MOUTH EVERY NIGHT AT BEDTIME 30 tablet 3    fluticasone (FLONASE) 50 MCG/ACT nasal spray 1 spray by Nasal route daily 1 each 3    amLODIPine (NORVASC) 10 MG tablet Take 1 tablet by mouth daily 30 tablet 3    FASENRA PEN 30 MG/ML SOAJ       albuterol (PROVENTIL) (2.5 MG/3ML) 0.083% nebulizer solution USE 1 VIAL VIA NEBULIZER EVERY 6 HOURS AS NEEDED 450 mL 3    albuterol sulfate HFA (PROVENTIL;VENTOLIN;PROAIR) 108 (90 Base) MCG/ACT inhaler INHALE TWO PUFFS BY MOUTH FOUR TIMES A DAY AS NEEDED FOR WHEEZING 8.5 g 2    ondansetron (ZOFRAN) 4 MG tablet Take 1 tablet by mouth every 12 hours as needed for Nausea or Vomiting 30 tablet 3    azelastine (ASTELIN) 0.1 % nasal spray 1 spray by Nasal route 2 times daily Use in each nostril as directed 1 each 2    folic acid (FOLVITE) 150 MCG tablet TAKE ONE TABLET BY MOUTH EVERY DAY 90 tablet 1    pravastatin (PRAVACHOL) 40 MG tablet TAKE ONE TABLET BY MOUTH EVERY DAY 90 tablet 1    vitamin B-12 (CYANOCOBALAMIN) 100 MCG tablet TAKE ONE TABLET BY MOUTH EVERY DAY 30 tablet 0    pantoprazole (PROTONIX) 20 MG tablet Take 1 tablet by mouth daily 30 tablet 3    polyethylene glycol (GLYCOLAX) 17 GM/SCOOP powder USE 17GMS AS DIRECTED ONCE DAILY AS NEEDED FOR CONSTIPATION 765 g 5    loratadine (CLARITIN) 10 MG tablet Take 1 tablet by mouth daily 30 tablet 3    ferrous sulfate (FE TABS) 325 (65 Fe) MG EC tablet Take 1 tablet by mouth every morning (before breakfast) 30 tablet 3    Respiratory Therapy Supplies (NEBULIZER/TUBING/MOUTHPIECE) KIT 1 kit by Does not apply route daily as needed (use as directed) 1 kit 0    Spacer/Aero-Holding Chambers (E-Z SPACER) GASTON Us as directed for asthma 1 Device 0    ibuprofen (ADVIL;MOTRIN) 800 MG tablet Take 1 tablet by mouth 2 times daily as needed for Pain 120 tablet 1     No current facility-administered medications on file prior to visit. Bacitracin, Butalbital-aspirin-caffeine, Codeine, Floxin [ofloxacin], Rocephin [ceftriaxone sodium], Tomato, Asa [aspirin], Lisinopril, and Azithromycin  Past Medical History:   Diagnosis Date    Allergic rhinitis     Anxiety disorder     Cleft lip     Divergent strabismus     Diverticulitis 2013    s/p partial colectomy, Dr Jolanta Duran    Hyperlipidemia LDL goal <100     Hypertension     Obesity (BMI 30-39. 9)     Severe persistent asthma without complication     intubated x 2013    Type 2 diabetes mellitus ([facility] [address] 2020    A1C 6.8     Past Surgical History:   Procedure Laterality Date     SECTION      x 3    CLEFT LIP REPAIR      OTHER SURGICAL HISTORY  12/28/13    Exploratory laparotomy with sigmoid colectomy, drainage pelvic abscess and formation end descending colostomy    OTHER SURGICAL HISTORY  3/20/14    Exploratory lapartomy with extensived lysis of adhesions, takedown of colostmy with primary stapled anastomosis, rigid sigmoidoscopy     Social History     Socioeconomic History    Marital status:      Spouse name: Not on file    Number of children: 1    Years of education: Not on file    Highest education level: Not on file   Occupational History    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[employer]    Tobacco Use    Smoking status: Never    Smokeless tobacco: Never   Vaping Use    Vaping Use: Never used   Substance and Sexual Activity    Alcohol use: No     Alcohol/week: 0.0 standard drinks     Comment: social    Drug use: No    Sexual activity: Not on file   Other Topics Concern    Not on file   Social History Narrative    Born in [facility], one of 3     Dec [birth year], 2015 to a [place]    3 children on [facility]    Works in [employer] [address] 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[facility] [place] 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 and grandson    Hobbies cooking, family, bowling     Social Determinants of Health     Financial Resource Strain: Low Risk     Difficulty of Paying Living Expenses: Not hard at [facility] Insecurity: No Food Insecurity    Worried About [address] [employer] in the Last Year: Never true    [address] in the Last Year: Never true   Transportation Needs: Unknown    Lack of Transportation (Medical): Not on file    Lack of Transportation (Non-Medical):  No   Physical Activity: Not on file   Stress: Not on file   Social Connections: Not on file   Intimate Partner Violence: Not on file   Housing Stability: Unknown    Unable to Pay for Housing in the Last Year: Not on file    Number of Places Lived in the Last Year: Not on file    Unstable Housing in the Last Year: No     Family History   Problem Relation Age of Onset    Heart Failure Mother         dec age Diabetes Mother     Cancer Sister         pancreas, dec age 40         Review of Systems   Constitutional:  Negative for chills, diaphoresis, fatigue and fever. HENT:  Negative for congestion, dental problem, drooling, ear discharge, ear pain, facial swelling, hearing loss, mouth sores, nosebleeds, postnasal drip, rhinorrhea, sinus pressure, sinus pain, sneezing, sore throat, tinnitus, trouble swallowing and voice change. Eyes:  Negative for photophobia, pain, discharge, redness, itching and visual disturbance. Respiratory:  Negative for apnea, cough, chest tightness, shortness of breath and wheezing. Cardiovascular:  Negative for chest pain, palpitations and leg swelling. Gastrointestinal:  Negative for abdominal distention, abdominal pain, blood in stool, constipation, diarrhea, nausea, rectal pain and vomiting. Endocrine: Negative for cold intolerance, heat intolerance, polydipsia, polyphagia and polyuria. Genitourinary:  Negative for decreased urine volume, difficulty urinating, dysuria, flank pain, frequency, genital sores, hematuria and urgency. Musculoskeletal:  Negative for arthralgias, back pain, gait problem, joint swelling, myalgias, neck pain and neck stiffness. Right shoulder pain   Skin:  Negative for color change, rash and wound. Allergic/Immunologic: Negative for environmental allergies and food allergies. Neurological:  Negative for dizziness, tremors, seizures, syncope, facial asymmetry, speech difficulty, weakness, light-headedness, numbness and headaches. Hematological:  Negative for adenopathy. Does not bruise/bleed easily. Psychiatric/Behavioral:  Negative for agitation, confusion, decreased concentration, hallucinations, self-injury, sleep disturbance and suicidal ideas. The patient is not nervous/anxious.         Objective:   /82   Pulse 92   Ht 5' 5\" (1.651 m)   Wt 177 lb (80.3 kg)   SpO2 98%   BMI 29.45 kg/m²     Physical Exam  Constitutional: General: She is not in acute distress. Appearance: She is well-developed. HENT:      Head: Normocephalic. Right Ear: External ear normal.      Left Ear: External ear normal.   Eyes:      Conjunctiva/sclera: Conjunctivae normal.   Neck:      Vascular: No JVD. Trachea: No tracheal deviation. Cardiovascular:      Rate and Rhythm: Normal rate and regular rhythm. Heart sounds: Normal heart sounds. Pulmonary:      Effort: Pulmonary effort is normal. No respiratory distress. Breath sounds: Normal breath sounds. No wheezing or rales. Chest:      Chest wall: No tenderness. Abdominal:      General: Bowel sounds are normal. There is no distension. Palpations: Abdomen is soft. There is no mass. Tenderness: There is no abdominal tenderness. There is no guarding or rebound. Musculoskeletal:         General: No tenderness or deformity. Cervical back: Neck supple. Skin:     General: Skin is warm and dry. Coloration: Skin is not pale. Findings: No erythema or rash. Neurological:      Mental Status: She is alert and oriented to person, place, and time. Motor: No abnormal muscle tone. Psychiatric:         Thought Content: Thought content normal.         Judgment: Judgment normal.       Assessment:       Diagnosis Orders   1. Acute cystitis without hematuria  POCT Urinalysis No Micro (Auto)    Culture, Urine      2. Essential hypertension        3. Hyperlipidemia, unspecified hyperlipidemia type        4. Pre-diabetes               No results found for: LIPIDPAN, BMP, CMP, CBC, CBCAUTODIF  Plan:      Dysuria: Obtain a urinalysis with culture    Hypertension, unspecified type - increase Norvasc 10 mg orally daily    Hyperlipidemia, unspecified hyperlipidemia type-Pravachol 40 mg orally daily    Moderate persistent asthma with exacerbation-albuterol, Singulair.   Provided the patient with an albuterol nebulizer             HM : REFREED FOR A COLONOSCOPY  No follow-ups on file. On this date 03/13/23 I have spent 30 minutes reviewing previous notes, test results and face to face with the patient discussing the diagnosis and importance of compliance with the treatment plan. Yunior Sierra MD    Please note, this report has been partially produced using speech recognition software  and may cause  and /or contain errors related to that system including grammar, punctuation and spelling as well as words and phrases that may seem inappropriate. If there are questions or concerns please feel free to contact me to clarify.

## 2023-03-13 NOTE — PROGRESS NOTES
Gastroenterology Clinic Visit    Laurita Nelson  [id number]  Chief Complaint   Patient presents with    New Patient     HPI: 62 y.o. female initially referred to the GI clinic with concern for mild worsening of reflux, presence of a hiatal hernia on chest imaging. Patient subsequently referred by her primary care's office for screening colonoscopy. Patient with history of asthma since age 15, some worsening of symptoms last year, has had good improvement in symptoms with current medication. Patient was started on Protonix about a year ago. She has intermittent reflux symptoms. But feels improvement in her reflux symptoms with Protonix. She denies any dysphagia. She denies any hematemesis or melena. She has intentionally lost about 30 pounds over the last couple of years. With regards to her lower GI symptoms she reports tendency towards constipation. She has been taking MiraLAX without any change in symptoms. She denies any blood in the stool. Previous GI work up/Endoscopic investigations:   None previous    Review of Systems   All other systems reviewed and are negative. Past Medical History:   Diagnosis Date    Allergic rhinitis     Anxiety disorder     Cleft lip     Divergent strabismus     Diverticulitis 2013    s/p partial colectomy, Dr Arvizu Laser    Hyperlipidemia LDL goal <100     Hypertension     Obesity (BMI 30-39. 9)     Severe persistent asthma without complication     intubated x 2013    Type 2 diabetes mellitus ([facility] [address] 2020    A1C 6.8     Past Surgical History:   Procedure Laterality Date     SECTION      x 3    CLEFT LIP REPAIR      OTHER SURGICAL HISTORY  13    Exploratory laparotomy with sigmoid colectomy, drainage pelvic abscess and formation end descending colostomy    OTHER SURGICAL HISTORY  3/20/14    Exploratory lapartomy with extensived lysis of adhesions, takedown of colostmy with primary stapled anastomosis, rigid sigmoidoscopy     Current Outpatient Medications on File Prior to Visit   Medication Sig Dispense Refill    escitalopram (LEXAPRO) 10 MG tablet Take 1 tablet by mouth daily 30 tablet 0    montelukast (SINGULAIR) 10 MG tablet TAKE 1 TABLET BY MOUTH EVERY NIGHT AT BEDTIME 30 tablet 3    fluticasone (FLONASE) 50 MCG/ACT nasal spray 1 spray by Nasal route daily 1 each 3    amLODIPine (NORVASC) 10 MG tablet Take 1 tablet by mouth daily 30 tablet 3    FASENRA PEN 30 MG/ML SOAJ       albuterol (PROVENTIL) (2.5 MG/3ML) 0.083% nebulizer solution USE 1 VIAL VIA NEBULIZER EVERY 6 HOURS AS NEEDED 450 mL 3    albuterol sulfate HFA (PROVENTIL;VENTOLIN;PROAIR) 108 (90 Base) MCG/ACT inhaler INHALE TWO PUFFS BY MOUTH FOUR TIMES A DAY AS NEEDED FOR WHEEZING 8.5 g 2    ondansetron (ZOFRAN) 4 MG tablet Take 1 tablet by mouth every 12 hours as needed for Nausea or Vomiting 30 tablet 3    azelastine (ASTELIN) 0.1 % nasal spray 1 spray by Nasal route 2 times daily Use in each nostril as directed 1 each 2    folic acid (FOLVITE) 562 MCG tablet TAKE ONE TABLET BY MOUTH EVERY DAY 90 tablet 1    pravastatin (PRAVACHOL) 40 MG tablet TAKE ONE TABLET BY MOUTH EVERY DAY 90 tablet 1    vitamin B-12 (CYANOCOBALAMIN) 100 MCG tablet TAKE ONE TABLET BY MOUTH EVERY DAY 30 tablet 0    pantoprazole (PROTONIX) 20 MG tablet Take 1 tablet by mouth daily 30 tablet 3    loratadine (CLARITIN) 10 MG tablet Take 1 tablet by mouth daily 30 tablet 3    Respiratory Therapy Supplies (NEBULIZER/TUBING/MOUTHPIECE) KIT 1 kit by Does not apply route daily as needed (use as directed) 1 kit 0    Spacer/Aero-Holding Chambers (E-Z SPACER) GASTON Us as directed for asthma 1 Device 0    ibuprofen (ADVIL;MOTRIN) 800 MG tablet Take 1 tablet by mouth 2 times daily as needed for Pain 120 tablet 1     No current facility-administered medications on file prior to visit.      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[employer]    Tobacco Use    Smoking status: Never    Smokeless tobacco: Never   Vaping Use    Vaping Use: Never used   Substance and Sexual Activity    Alcohol use: No     Alcohol/week: 0.0 standard drinks     Comment: social    Drug use: No   Social History Narrative    Born in [facility], one of 3     Dec 30, 2015 to a [place]    3 children on [facility]    Works in [employer] [address] 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[place] 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 and grandson    Hobbies cooking, family, bowling     Social Determinants of Health     Financial Resource Strain: Low Risk     Difficulty of Paying Living Expenses: Not hard at all   Food Insecurity: No [place] in the Last Year: Never true    [address] in the Last Year: Never true   Transportation Needs: Unknown    Lack of Transportation (Non-Medical): No   Housing Stability: Unknown    Unstable Housing in the Last Year: No       Pulse 65, height 5' 5\" (1.651 m), weight 176 lb (79.8 kg), SpO2 100 %, not currently breastfeeding. Physical Exam  Constitutional:       General: She is not in acute distress. Appearance: Normal appearance. She is well-developed. Eyes:      General: No scleral icterus. Cardiovascular:      Rate and Rhythm: Normal rate and regular rhythm. Pulmonary:      Effort: Pulmonary effort is normal.      Breath sounds: Normal breath sounds. Abdominal:      General: Bowel sounds are normal. There is no distension. Palpations: Abdomen is soft. There is no mass. Tenderness: There is no abdominal tenderness. There is no guarding or rebound. Musculoskeletal:         General: Normal range of motion. Lymphadenopathy:      Cervical: No cervical adenopathy.    Neurological:      Mental Status: She is alert and oriented to person, place, and time.   Psychiatric:         Behavior: Behavior normal.         Thought Content: Thought content normal.         Judgment: Judgment normal.       Laboratory, Pathology, Radiology reviewed indetail with relevant important investigations summarized below:  Lab Results   Component Value Date    WBC 6.1 11/01/2022    HGB 14.4 11/01/2022    HCT 43.2 11/01/2022    MCV 91.5 11/01/2022     11/01/2022     Lab Results   Component Value Date    IRON 23 (L) 09/18/2014    TIBC 421 09/18/2014    FERRITIN 9.7 (L) 09/18/2014     Lab Results   Component Value Date    MZFWXADQ64 <150 (L) 07/24/2020      Lab Results   Component Value Date    FOLATE 5.8 (L) 07/24/2020     Lab Results   Component Value Date    LABALBU 4.0 11/01/2022      Lab Results   Component Value Date    ALT 14 11/01/2022    AST 19 11/01/2022    ALKPHOS 81 11/01/2022    BILITOT 1.0 (H) 11/01/2022     Xray Result (most recent):  XR CHEST STANDARD TWO VW 01/09/2023    Narrative  EXAMINATION:  TWO XRAY VIEWS OF THE CHEST    1/9/2023 10:22 am    COMPARISON:  11/01/2022, CT abdomen/pelvis: 12/27/2013 6    HISTORY:  ORDERING SYSTEM PROVIDED HISTORY: Bronchitis, Cough, unspecified type  TECHNOLOGIST PROVIDED HISTORY:  Reason for exam:->cough  What reading provider will be dictating this exam?->CRC    FINDINGS:  Heart is normal in size.  Lungs are clear and well expanded.  No pleural  effusion or pneumothorax.  Bony structures are unremarkable.  There are  metallic clips below the left hemidiaphragm.  There is a retrocardiac density  with air-fluid level, suspected hiatal hernia.  Hiatal hernia was seen on  prior CT scan.    Impression  No acute findings.  Hiatal hernia.     Assessment and Plan:  57 y.o. female with hiatal hernia on chest x-ray, longstanding history of asthma with some worsening of symptoms over the last year, good response with PPI therapy and albuterol inhalers/azelastine nasal spray.  No alarm symptoms i.e. dysphagia,  hematemesis, melena or weight loss  1. Gastroesophageal reflux disease, unspecified whether esophagitis present  2. Hiatal hernia  -Antireflux measures  -Continue with Protonix at this time given good response, Pepcid for breakthrough symptoms  -EGD to evaluate extent/size of hiatal hernia  -Reassurance provided  -May consider pH impedance pending upper endoscopy examination    3. Screen for colon cancer  -Colonoscopy is indicated, procedure was discussed at length, including the risks and benefits. Split prep was prescribed and discussed. Importance of the prep in ensuring a good exam was emphasized. - Sod Picosulfate-Mag Ox-Cit Acd (CLENPIQ) 10-3.5-12 MG-GM -GM/160ML SOLN solution; Take as directed  Dispense: 320 mL; Refill: 0    4. Other constipation  - Lifestyle modification including importance of adequate fluid intake through the day, regular exercise, good toilet hygiene discussed in detail  - Advised patient to increase fiber supplementation, aim for 15 -25 gms of fiber a day, recommend starting over-the-counter fiber supplementation i.e. Metamucil. Patient advised to watch out for excessive bloating.  - May add Miralax 17 gm/d to above regimen and titrate to facilitate 1-2 soft bowel movements daily    > 50% of 40 minutes was spent spent on counseling, coordinating care based on my plan and assessment as noted above. Follow-up in GI clinic after completion of endoscopic evaluation     Vani Merchant MD   Staff Gastroenterologist  [facility]    Please note this report has been partially produced using speech recognition software and may cause or contain errors related to thatsystem including grammar, punctuation and spelling as well as words and phrases that may seem inappropriate. If there are questions or concerns please feel free to contact me to clarify.

## 2023-03-15 LAB — BACTERIA UR CULT: NORMAL

## 2023-03-28 DIAGNOSIS — J45.50 SEVERE PERSISTENT ASTHMA WITHOUT COMPLICATION: ICD-10-CM

## 2023-03-29 RX ORDER — BENRALIZUMAB 30 MG/ML
30 INJECTION, SOLUTION SUBCUTANEOUS ONCE
Qty: 1 ML | Refills: 0 | COMMUNITY
Start: 2023-03-29 | End: 2023-03-29

## 2023-04-12 PROBLEM — Z12.11 SCREEN FOR COLON CANCER: Status: RESOLVED | Noted: 2023-03-13 | Resolved: 2023-04-12

## 2023-04-20 NOTE — TELEPHONE ENCOUNTER
Calling to confirm appointment with Dr. Quintanilla Speak and to pre-chart for visit check medications and if she needed refills
Yes

## 2023-05-04 RX ORDER — SODIUM CHLORIDE 9 MG/ML
INJECTION, SOLUTION INTRAVENOUS CONTINUOUS
Status: CANCELLED | OUTPATIENT
Start: 2023-05-04

## 2023-05-04 RX ORDER — SODIUM CHLORIDE 0.9 % (FLUSH) 0.9 %
5-40 SYRINGE (ML) INJECTION EVERY 12 HOURS SCHEDULED
Status: CANCELLED | OUTPATIENT
Start: 2023-05-04

## 2023-05-04 RX ORDER — SODIUM CHLORIDE 9 MG/ML
25 INJECTION, SOLUTION INTRAVENOUS PRN
Status: CANCELLED | OUTPATIENT
Start: 2023-05-04

## 2023-05-04 RX ORDER — SODIUM CHLORIDE 0.9 % (FLUSH) 0.9 %
5-40 SYRINGE (ML) INJECTION PRN
Status: CANCELLED | OUTPATIENT
Start: 2023-05-04

## 2023-05-05 ENCOUNTER — ANESTHESIA (OUTPATIENT)
Dept: ENDOSCOPY | Age: 58
End: 2023-05-05
Payer: COMMERCIAL

## 2023-05-05 ENCOUNTER — ANESTHESIA EVENT (OUTPATIENT)
Dept: ENDOSCOPY | Age: 58
End: 2023-05-05
Payer: COMMERCIAL

## 2023-05-05 ENCOUNTER — HOSPITAL ENCOUNTER (OUTPATIENT)
Age: 58
Setting detail: OUTPATIENT SURGERY
Discharge: HOME | End: 2023-05-05
Attending: INTERNAL MEDICINE | Admitting: INTERNAL MEDICINE
Payer: COMMERCIAL

## 2023-05-05 VITALS
HEART RATE: 110 BPM | DIASTOLIC BLOOD PRESSURE: 89 MMHG | OXYGEN SATURATION: 99 % | HEIGHT: 65 IN | RESPIRATION RATE: 18 BRPM | BODY MASS INDEX: 27.66 KG/M2 | TEMPERATURE: 98.1 F | SYSTOLIC BLOOD PRESSURE: 140 MMHG | WEIGHT: 166 LBS

## 2023-05-05 DIAGNOSIS — Z12.11 SCREEN FOR COLON CANCER: ICD-10-CM

## 2023-05-05 DIAGNOSIS — K21.9 GASTROESOPHAGEAL REFLUX DISEASE, UNSPECIFIED WHETHER ESOPHAGITIS PRESENT: ICD-10-CM

## 2023-05-05 DIAGNOSIS — K59.09 OTHER CONSTIPATION: ICD-10-CM

## 2023-05-05 PROCEDURE — 6370000000 HC RX 637 (ALT 250 FOR IP): Performed by: INTERNAL MEDICINE

## 2023-05-05 PROCEDURE — 2709999900 HC NON-CHARGEABLE SUPPLY: Performed by: INTERNAL MEDICINE

## 2023-05-05 PROCEDURE — 45378 DIAGNOSTIC COLONOSCOPY: CPT | Performed by: INTERNAL MEDICINE

## 2023-05-05 PROCEDURE — 3609017100 HC EGD: Performed by: INTERNAL MEDICINE

## 2023-05-05 PROCEDURE — C1769 GUIDE WIRE: HCPCS | Performed by: INTERNAL MEDICINE

## 2023-05-05 PROCEDURE — 3700000000 HC ANESTHESIA ATTENDED CARE: Performed by: INTERNAL MEDICINE

## 2023-05-05 PROCEDURE — 7100000011 HC PHASE II RECOVERY - ADDTL 15 MIN: Performed by: INTERNAL MEDICINE

## 2023-05-05 PROCEDURE — 2500000003 HC RX 250 WO HCPCS: Performed by: REGISTERED NURSE

## 2023-05-05 PROCEDURE — 43239 EGD BIOPSY SINGLE/MULTIPLE: CPT | Performed by: INTERNAL MEDICINE

## 2023-05-05 PROCEDURE — 88305 TISSUE EXAM BY PATHOLOGIST: CPT

## 2023-05-05 PROCEDURE — 3609027000 HC COLONOSCOPY: Performed by: INTERNAL MEDICINE

## 2023-05-05 PROCEDURE — 43248 EGD GUIDE WIRE INSERTION: CPT | Performed by: INTERNAL MEDICINE

## 2023-05-05 PROCEDURE — 7100000010 HC PHASE II RECOVERY - FIRST 15 MIN: Performed by: INTERNAL MEDICINE

## 2023-05-05 PROCEDURE — 88342 IMHCHEM/IMCYTCHM 1ST ANTB: CPT

## 2023-05-05 PROCEDURE — 2580000003 HC RX 258: Performed by: INTERNAL MEDICINE

## 2023-05-05 PROCEDURE — 3700000001 HC ADD 15 MINUTES (ANESTHESIA): Performed by: INTERNAL MEDICINE

## 2023-05-05 PROCEDURE — 6360000002 HC RX W HCPCS: Performed by: REGISTERED NURSE

## 2023-05-05 RX ORDER — SODIUM CHLORIDE 9 MG/ML
INJECTION, SOLUTION INTRAVENOUS CONTINUOUS
Status: DISCONTINUED | OUTPATIENT
Start: 2023-05-05 | End: 2023-05-05 | Stop reason: HOSPADM

## 2023-05-05 RX ORDER — SIMETHICONE 20 MG/.3ML
EMULSION ORAL PRN
Status: DISCONTINUED | OUTPATIENT
Start: 2023-05-05 | End: 2023-05-05 | Stop reason: ALTCHOICE

## 2023-05-05 RX ORDER — MAGNESIUM HYDROXIDE 1200 MG/15ML
LIQUID ORAL PRN
Status: DISCONTINUED | OUTPATIENT
Start: 2023-05-05 | End: 2023-05-05 | Stop reason: ALTCHOICE

## 2023-05-05 RX ORDER — ALPRAZOLAM 0.5 MG/1
0.5 TABLET ORAL NIGHTLY PRN
COMMUNITY

## 2023-05-05 RX ORDER — SODIUM CHLORIDE 0.9 % (FLUSH) 0.9 %
5-40 SYRINGE (ML) INJECTION EVERY 12 HOURS SCHEDULED
Status: DISCONTINUED | OUTPATIENT
Start: 2023-05-05 | End: 2023-05-05 | Stop reason: HOSPADM

## 2023-05-05 RX ORDER — PROPOFOL 10 MG/ML
INJECTION, EMULSION INTRAVENOUS PRN
Status: DISCONTINUED | OUTPATIENT
Start: 2023-05-05 | End: 2023-05-05 | Stop reason: SDUPTHER

## 2023-05-05 RX ORDER — SODIUM CHLORIDE 9 MG/ML
25 INJECTION, SOLUTION INTRAVENOUS PRN
Status: DISCONTINUED | OUTPATIENT
Start: 2023-05-05 | End: 2023-05-05 | Stop reason: HOSPADM

## 2023-05-05 RX ORDER — LIDOCAINE HYDROCHLORIDE 20 MG/ML
INJECTION, SOLUTION INFILTRATION; PERINEURAL PRN
Status: DISCONTINUED | OUTPATIENT
Start: 2023-05-05 | End: 2023-05-05 | Stop reason: SDUPTHER

## 2023-05-05 RX ORDER — SODIUM CHLORIDE 0.9 % (FLUSH) 0.9 %
5-40 SYRINGE (ML) INJECTION PRN
Status: DISCONTINUED | OUTPATIENT
Start: 2023-05-05 | End: 2023-05-05 | Stop reason: HOSPADM

## 2023-05-05 RX ORDER — GLYCOPYRROLATE 1 MG/5 ML
SYRINGE (ML) INTRAVENOUS PRN
Status: DISCONTINUED | OUTPATIENT
Start: 2023-05-05 | End: 2023-05-05 | Stop reason: SDUPTHER

## 2023-05-05 RX ADMIN — PROPOFOL 100 MG: 10 INJECTION, EMULSION INTRAVENOUS at 13:32

## 2023-05-05 RX ADMIN — PROPOFOL 100 MG: 10 INJECTION, EMULSION INTRAVENOUS at 13:39

## 2023-05-05 RX ADMIN — PROPOFOL 100 MG: 10 INJECTION, EMULSION INTRAVENOUS at 13:44

## 2023-05-05 RX ADMIN — PROPOFOL 100 MG: 10 INJECTION, EMULSION INTRAVENOUS at 13:51

## 2023-05-05 RX ADMIN — LIDOCAINE HYDROCHLORIDE 100 MG: 20 INJECTION, SOLUTION INFILTRATION; PERINEURAL at 13:32

## 2023-05-05 RX ADMIN — PROPOFOL 100 MG: 10 INJECTION, EMULSION INTRAVENOUS at 13:35

## 2023-05-05 RX ADMIN — PROPOFOL 50 MG: 10 INJECTION, EMULSION INTRAVENOUS at 13:38

## 2023-05-05 RX ADMIN — Medication 0.2 MG: at 13:32

## 2023-05-05 RX ADMIN — SODIUM CHLORIDE: 9 INJECTION, SOLUTION INTRAVENOUS at 12:24

## 2023-05-05 ASSESSMENT — PAIN SCALES - GENERAL: PAINLEVEL_OUTOF10: 0

## 2023-05-05 ASSESSMENT — PAIN - FUNCTIONAL ASSESSMENT: PAIN_FUNCTIONAL_ASSESSMENT: 0-10

## 2023-05-05 NOTE — H&P
Patient Name: Shasha Marie  : 1965  MRN: [id number]  DATE: 23      ENDOSCOPY  History and Physical    Procedure:    [] Diagnostic Colonoscopy       [x] Screening Colonoscopy  [x] EGD      [] ERCP      [] EUS       [] Other    [x] Previous office notes/History and Physical reviewed from the patients chart. Please see EMR for further details of HPI. I have examined the patient's status immediately prior to the procedure and:      Indications/HPI:    []Abdominal Pain   []Cancer- GI/Lung  []Fhx of colon CA  []History of Polyps   []Encisos   []Melena  []Abnormal Imaging   []Dysphagia    []Persistent Pneumonia  []Anemia   []Food Impaction  []History of Polyps  []GI Bleed   []Pulmonary nodule/Mass  []Change in bowel habits  [x]Heartburn/Reflux  []Rectal Bleed (BRBPR)  []Chest Pain - Non Cardiac  []Heme (+) Stool  []Ulcers  []Constipation   []Hemoptysis   []Varices  []Diarrhea   []Hypoxemia  []Nausea/Vomiting   [x]Screening   []Crohns/Colitis  []Other:    Anesthesia:   [x] MAC [] Moderate Sedation   [] General   [] None     ROS: 12 pt Review of Symptoms was negative unless mentioned above    Medications:   Prior to Admission medications    Medication Sig Start Date End Date Taking? Authorizing Provider   ALPRAZolam Araceli Nguyen) 0.5 MG tablet Take 1 tablet by mouth nightly as needed for Sleep.  Max Daily Amount: 0.5 mg   Yes Historical Provider, MD   escitalopram (LEXAPRO) 10 MG tablet Take 1 tablet by mouth daily 3/13/23   Leopoldo Eng, MD   Sod Picosulfate-Mag Ox-Cit Acd [facility]) 10-3.5-12 MG-GM -GM/160ML SOLN solution Take as directed 3/13/23   Alondra Yip MD   montelukast (SINGULAIR) 10 MG tablet TAKE 1 TABLET BY MOUTH EVERY NIGHT AT BEDTIME 23   Tamera Tipton MD   fluticasone (FLONASE) 50 MCG/ACT nasal spray 1 spray by Nasal route daily  Patient not taking: Reported on 2023   Tamera Tipton MD   amLODIPine (NORVASC) 10 MG tablet Take 1 tablet by mouth daily 2/10/23   Tania Galvez

## 2023-05-05 NOTE — ANESTHESIA POSTPROCEDURE EVALUATION
Department of Anesthesiology  Postprocedure Note    Patient: Kathi Sahni  MRN: [id number]  YOB: 1965  Date of evaluation: 5/5/2023      Procedure Summary     Date: 05/05/23 Room / Location: [address] [place] [address]    Anesthesia Start: 1325 Anesthesia Stop: 1401    Procedures:       COLONOSCOPY DIAGNOSTIC      EGD DIAGNOSTIC ONLY Diagnosis:       Gastroesophageal reflux disease, unspecified whether esophagitis present      Screen for colon cancer      Other constipation      (Gastroesophageal reflux disease, unspecified whether esophagitis present [K21.9])      (Screen for colon cancer [Z12.11])      (Other constipation [K59.09])    Surgeons: Brianne Ross MD Responsible Provider: ROOPA Miller CRNA    Anesthesia Type: MAC ASA Status: 3          Anesthesia Type: No value filed.     Ping Phase I: Ping Score: 10    Ping Phase II:        Anesthesia Post Evaluation    Patient location during evaluation: bedside  Patient participation: complete - patient participated  Level of consciousness: awake and awake and alert  Airway patency: patent  Nausea & Vomiting: no nausea and no vomiting  Complications: no  Cardiovascular status: blood pressure returned to baseline and hemodynamically stable  Respiratory status: acceptable  Hydration status: euvolemic

## 2023-05-05 NOTE — ANESTHESIA PRE PROCEDURE
Department of Anesthesiology  Preprocedure Note       Name:  Leigh Peters   Age:  62 y.o.  :  1965                                          MRN:  [id number]         Date:  2023      Surgeon: Gordon Carson):  Juan Olivas MD    Procedure: Procedure(s):  COLONOSCOPY DIAGNOSTIC  EGD DIAGNOSTIC ONLY    Medications prior to admission:   Prior to Admission medications    Medication Sig Start Date End Date Taking?  Authorizing Provider   escitalopram (LEXAPRO) 10 MG tablet Take 1 tablet by mouth daily 3/13/23   Vesna Bennett MD   Sod Picosulfate-Mag Ox-Cit Acd [facility]) 10-3.5-12 MG-GM -GM/160ML SOLN solution Take as directed 3/13/23   Juan Olivas MD   montelukast (SINGULAIR) 10 MG tablet TAKE 1 TABLET BY MOUTH EVERY NIGHT AT BEDTIME 23   Grecia Henao MD   fluticasone (FLONASE) 50 MCG/ACT nasal spray 1 spray by Nasal route daily 23   Grecia Henao MD   amLODIPine (NORVASC) 10 MG tablet Take 1 tablet by mouth daily 2/10/23   Vesna Bennett MD   ibuprofen (ADVIL;MOTRIN) 800 MG tablet Take 1 tablet by mouth 2 times daily as needed for Pain 1/4/23 2/10/23  Vesna Bennett MD   albuterol (PROVENTIL) (2.5 MG/3ML) 0.083% nebulizer solution USE 1 VIAL VIA NEBULIZER EVERY 6 HOURS AS NEEDED 22   Grecia Henao MD   albuterol sulfate HFA (PROVENTIL;VENTOLIN;PROAIR) 108 (90 Base) MCG/ACT inhaler INHALE TWO PUFFS BY MOUTH FOUR TIMES A DAY AS NEEDED FOR WHEEZING 22   Grecia Henao MD   ondansetron (ZOFRAN) 4 MG tablet Take 1 tablet by mouth every 12 hours as needed for Nausea or Vomiting 22   Viridiana Elliott MD   azelastine (ASTELIN) 0.1 % nasal spray 1 spray by Nasal route 2 times daily Use in each nostril as directed 22   Grecia Henao MD   folic acid (FOLVITE) 989 MCG tablet TAKE ONE TABLET BY MOUTH EVERY DAY 22   Viridiana Elliott MD   pravastatin (PRAVACHOL) 40 MG tablet TAKE ONE TABLET BY MOUTH EVERY DAY 22   Viridiana Elliott MD   vitamin B-12

## 2023-05-08 ENCOUNTER — TELEPHONE (OUTPATIENT)
Dept: FAMILY MEDICINE CLINIC | Age: 58
End: 2023-05-08

## 2023-05-08 NOTE — TELEPHONE ENCOUNTER
----- Message from [facility] NHAN Yost sent at 5/8/2023  1:48 PM EDT -----  Subject: Message to Provider    QUESTIONS  Information for Provider? The pt stated since March she has been   experiencing UTI symptoms that have now gotten worse. The pt stated she   was given a urinalysis that showed no infection but the pt stated her   urine is cloudy, with a foul odor and discomfort with frequent urination. The pt would like to know if she can be called in something to her [address] [place] [address] Road as soon as possible before her symptoms get any worse. Please   follow up with the pt to further assist.  ---------------------------------------------------------------------------  --------------  Magalys RAMOS  [phone]; OK to leave message on voicemail  ---------------------------------------------------------------------------  --------------  SCRIPT ANSWERS  Relationship to Patient?  Self

## 2023-05-10 NOTE — TELEPHONE ENCOUNTER
PT called back checking to see if anything can be called in for her. This has been an on going issue since march.

## 2023-05-11 ENCOUNTER — PATIENT MESSAGE (OUTPATIENT)
Dept: GASTROENTEROLOGY | Age: 58
End: 2023-05-11

## 2023-05-11 DIAGNOSIS — R30.0 DYSURIA: Primary | ICD-10-CM

## 2023-05-11 NOTE — TELEPHONE ENCOUNTER
I spoke to the patient and gave her some options. She does have an appt with Dr. Rosanna Bishop on 5/15/23.  I have ordered a urine culture which she will have done before her appt

## 2023-05-13 DIAGNOSIS — R30.0 DYSURIA: ICD-10-CM

## 2023-05-13 LAB
BACTERIA URNS QL MICRO: ABNORMAL /HPF
BILIRUB UR QL STRIP: NEGATIVE
CLARITY UR: ABNORMAL
COLOR UR: YELLOW
EPI CELLS #/AREA URNS AUTO: ABNORMAL /HPF (ref 0–5)
GLUCOSE UR STRIP-MCNC: NEGATIVE MG/DL
HGB UR QL STRIP: NEGATIVE
HYALINE CASTS #/AREA URNS AUTO: ABNORMAL /HPF (ref 0–5)
KETONES UR STRIP-MCNC: NEGATIVE MG/DL
LEUKOCYTE ESTERASE UR QL STRIP: ABNORMAL
NITRITE UR QL STRIP: NEGATIVE
PH UR STRIP: 6.5 [PH] (ref 5–9)
PROT UR STRIP-MCNC: NEGATIVE MG/DL
RBC #/AREA URNS AUTO: ABNORMAL /HPF (ref 0–5)
SP GR UR STRIP: 1.01 (ref 1–1.03)
URINE REFLEX TO CULTURE: ABNORMAL
UROBILINOGEN UR STRIP-ACNC: 0.2 E.U./DL
WBC #/AREA URNS AUTO: ABNORMAL /HPF (ref 0–5)

## 2023-05-14 RX ORDER — SULFAMETHOXAZOLE AND TRIMETHOPRIM 800; 160 MG/1; MG/1
1 TABLET ORAL 2 TIMES DAILY
Qty: 14 TABLET | Refills: 0 | Status: SHIPPED | OUTPATIENT
Start: 2023-05-14 | End: 2023-05-21

## 2023-05-16 ENCOUNTER — TELEPHONE (OUTPATIENT)
Dept: FAMILY MEDICINE CLINIC | Age: 58
End: 2023-05-16

## 2023-05-23 ENCOUNTER — OFFICE VISIT (OUTPATIENT)
Dept: GASTROENTEROLOGY | Age: 58
End: 2023-05-23
Payer: COMMERCIAL

## 2023-05-23 VITALS — OXYGEN SATURATION: 99 % | WEIGHT: 170 LBS | HEART RATE: 65 BPM | HEIGHT: 65 IN | BODY MASS INDEX: 28.32 KG/M2

## 2023-05-23 DIAGNOSIS — K44.9 HIATAL HERNIA: ICD-10-CM

## 2023-05-23 DIAGNOSIS — K59.04 CHRONIC IDIOPATHIC CONSTIPATION: ICD-10-CM

## 2023-05-23 DIAGNOSIS — K21.9 GASTROESOPHAGEAL REFLUX DISEASE, UNSPECIFIED WHETHER ESOPHAGITIS PRESENT: Primary | ICD-10-CM

## 2023-05-23 DIAGNOSIS — K57.90 DIVERTICULOSIS: ICD-10-CM

## 2023-05-23 PROCEDURE — 99214 OFFICE O/P EST MOD 30 MIN: CPT | Performed by: INTERNAL MEDICINE

## 2023-05-23 RX ORDER — PANTOPRAZOLE SODIUM 20 MG/1
20 TABLET, DELAYED RELEASE ORAL DAILY
Qty: 30 TABLET | Refills: 3 | Status: CANCELLED | OUTPATIENT
Start: 2023-05-23

## 2023-05-23 RX ORDER — PANTOPRAZOLE SODIUM 40 MG/1
40 TABLET, DELAYED RELEASE ORAL DAILY
Qty: 30 TABLET | Refills: 3 | Status: SHIPPED | OUTPATIENT
Start: 2023-05-23

## 2023-05-23 NOTE — PROGRESS NOTES
Gastroenterology Clinic Follow up Visit    Dionicio Bundy  [id number]  Chief Complaint   Patient presents with    Follow-up    Constipation       HPI: 62 y.o. female following up after EGD and colonoscopy on 5/5/2023, last GI clinic on 3/13/2023     Interval change: Patient reports improvement in swallowing symptoms after the dilation at recent upper endoscopy, no symptoms since. No reflux symptoms. She did run out of Protonix. She continues to have symptoms of constipation with straining, hard stools. She has been taking MiraLAX and Metamucil without significant improvement. She does report having a good bowel movement when she eats significant amount of greens. She has intentionally lost 30 pounds of weight at her last visit, since her last visit she has lost further 6 pounds. She attributes this to her diet change and going off steroids. HPI from last GI clinic visit on 3/13/2023 summarized below:  62 y.o. female initially referred to the GI clinic with concern for mild worsening of reflux, presence of a hiatal hernia on chest imaging. Patient subsequently referred by her primary care's office for screening colonoscopy. Patient with history of asthma since age 15, some worsening of symptoms last year, has had good improvement in symptoms with current medication. Patient was started on Protonix about a year ago. She has intermittent reflux symptoms. But feels improvement in her reflux symptoms with Protonix. She denies any dysphagia. She denies any hematemesis or melena. She has intentionally lost about 30 pounds over the last couple of years. With regards to her lower GI symptoms she reports tendency towards constipation. She has been taking MiraLAX without any change in symptoms. She denies any blood in the stool. Previous GI work up/Endoscopic investigations:  EGD and colonoscopy: 5/5/2023: Colonic diverticulosis, external and internal hemorrhoids.   Proximal esophageal web dilated to

## 2023-05-24 DIAGNOSIS — J45.50 SEVERE PERSISTENT ASTHMA WITHOUT COMPLICATION: ICD-10-CM

## 2023-05-24 DIAGNOSIS — J45.998 UNCONTROLLED PERSISTENT ASTHMA: ICD-10-CM

## 2023-05-24 RX ORDER — BENRALIZUMAB 30 MG/ML
30 INJECTION, SOLUTION SUBCUTANEOUS ONCE
Qty: 1 ML | Refills: 0 | COMMUNITY
Start: 2023-05-24 | End: 2023-05-24

## 2023-05-24 RX ORDER — PREDNISONE 20 MG/1
TABLET ORAL
Qty: 10 TABLET | Refills: 0 | Status: CANCELLED | OUTPATIENT
Start: 2023-05-24

## 2023-05-25 ENCOUNTER — TELEPHONE (OUTPATIENT)
Dept: FAMILY MEDICINE CLINIC | Age: 58
End: 2023-05-25

## 2023-06-04 RX ORDER — PREDNISONE 10 MG/1
TABLET ORAL
Qty: 20 TABLET | Refills: 0 | Status: SHIPPED | OUTPATIENT
Start: 2023-06-04

## 2023-06-06 ENCOUNTER — TELEPHONE (OUTPATIENT)
Dept: FAMILY MEDICINE CLINIC | Age: 58
End: 2023-06-06

## 2023-07-21 RX ORDER — PREDNISONE 10 MG/1
TABLET ORAL
Qty: 20 TABLET | Refills: 0 | Status: SHIPPED | OUTPATIENT
Start: 2023-07-21

## 2023-08-04 NOTE — TELEPHONE ENCOUNTER
Requested Prescriptions     Pending Prescriptions Disp Refills    ALPRAZolam (XANAX) 0.5 MG tablet       Sig: Take 1 tablet by mouth nightly as needed for Sleep.  Max Daily Amount: 0.5 mg

## 2023-08-07 RX ORDER — ALPRAZOLAM 0.5 MG/1
0.5 TABLET ORAL NIGHTLY PRN
OUTPATIENT
Start: 2023-08-07

## 2023-08-08 RX ORDER — DEXTROMETHORPHAN HYDROBROMIDE AND PROMETHAZINE HYDROCHLORIDE 15; 6.25 MG/5ML; MG/5ML
SYRUP ORAL
Qty: 150 ML | Refills: 0 | Status: SHIPPED | OUTPATIENT
Start: 2023-08-08

## 2023-08-08 NOTE — TELEPHONE ENCOUNTER
Rx requested:  Requested Prescriptions     Pending Prescriptions Disp Refills    promethazine-dextromethorphan (PROMETHAZINE-DM) 6.25-15 MG/5ML syrup [Pharmacy Med Name: PROMETHAZINE-DM 6.25-15MG/5ML SYRP] 150 mL 0     Sig: TAKE 5ML BY MOUTH FOUR TIMES A DAY AS NEEDED FOR COUGH         Last Office Visit:   3/13/2023      Next Visit Date:  Future Appointments   Date Time Provider [address]   8/14/2023  1:45 PM Michelle Weinstein MD [facility]

## 2023-08-14 ENCOUNTER — OFFICE VISIT (OUTPATIENT)
Dept: PULMONOLOGY | Age: 58
End: 2023-08-14
Payer: COMMERCIAL

## 2023-08-14 VITALS
WEIGHT: 167 LBS | HEIGHT: 65 IN | OXYGEN SATURATION: 97 % | BODY MASS INDEX: 27.82 KG/M2 | SYSTOLIC BLOOD PRESSURE: 138 MMHG | DIASTOLIC BLOOD PRESSURE: 76 MMHG | HEART RATE: 95 BPM | TEMPERATURE: 97.6 F

## 2023-08-14 DIAGNOSIS — R05.3 CHRONIC COUGH: ICD-10-CM

## 2023-08-14 DIAGNOSIS — J30.2 SEASONAL ALLERGIES: ICD-10-CM

## 2023-08-14 DIAGNOSIS — K44.9 HIATAL HERNIA: ICD-10-CM

## 2023-08-14 DIAGNOSIS — K21.9 GASTROESOPHAGEAL REFLUX DISEASE WITHOUT ESOPHAGITIS: ICD-10-CM

## 2023-08-14 DIAGNOSIS — J45.50 SEVERE PERSISTENT ASTHMA, UNSPECIFIED WHETHER COMPLICATED: Primary | ICD-10-CM

## 2023-08-14 DIAGNOSIS — J45.50 SEVERE PERSISTENT ASTHMA, UNSPECIFIED WHETHER COMPLICATED: ICD-10-CM

## 2023-08-14 PROCEDURE — 3078F DIAST BP <80 MM HG: CPT | Performed by: INTERNAL MEDICINE

## 2023-08-14 PROCEDURE — 99214 OFFICE O/P EST MOD 30 MIN: CPT | Performed by: INTERNAL MEDICINE

## 2023-08-14 PROCEDURE — 3075F SYST BP GE 130 - 139MM HG: CPT | Performed by: INTERNAL MEDICINE

## 2023-08-14 RX ORDER — ALBUTEROL SULFATE 2.5 MG/3ML
SOLUTION RESPIRATORY (INHALATION)
Qty: 450 ML | Refills: 3 | Status: SHIPPED | OUTPATIENT
Start: 2023-08-14

## 2023-08-14 RX ORDER — ALBUTEROL SULFATE 90 UG/1
AEROSOL, METERED RESPIRATORY (INHALATION)
Qty: 8.5 G | Refills: 2 | Status: SHIPPED | OUTPATIENT
Start: 2023-08-14

## 2023-08-14 RX ORDER — BUDESONIDE, GLYCOPYRROLATE, AND FORMOTEROL FUMARATE 160; 9; 4.8 UG/1; UG/1; UG/1
2 AEROSOL, METERED RESPIRATORY (INHALATION) 2 TIMES DAILY
Qty: 4 EACH | Refills: 0 | Status: SHIPPED | COMMUNITY
Start: 2023-08-14

## 2023-08-14 RX ORDER — BENZONATATE 100 MG/1
100 CAPSULE ORAL 3 TIMES DAILY PRN
Qty: 30 CAPSULE | Refills: 4 | Status: SHIPPED | OUTPATIENT
Start: 2023-08-14 | End: 2023-10-03

## 2023-08-15 RX ORDER — BENRALIZUMAB 30 MG/ML
INJECTION, SOLUTION SUBCUTANEOUS
Qty: 1 ML | Refills: 5 | Status: SHIPPED | OUTPATIENT
Start: 2023-08-15 | End: 2023-08-16 | Stop reason: SDUPTHER

## 2023-08-15 NOTE — TELEPHONE ENCOUNTER
Please approve or deny this request.    Rx requested:  Requested Prescriptions     Pending Prescriptions Disp Refills    FASENRA PEN 30 MG/ML [facility] [Pharmacy Med Name: Mike Mackay PEN AUTOINJECTOR 30MG/ML] 1 mL 5     Sig: INJECT 1 PEN UNDER THE SKIN EVERY 8 WEEKS.          Last Office Visit:   8/14/2023      Next Visit Date:  Future Appointments   Date Time Provider [address] [place] [address]   10/2/2023  2:00 PM Ngozi Middleton MD [address] [place] [address]

## 2023-08-16 DIAGNOSIS — J45.50 SEVERE PERSISTENT ASTHMA, UNSPECIFIED WHETHER COMPLICATED: ICD-10-CM

## 2023-08-16 RX ORDER — BENRALIZUMAB 30 MG/ML
INJECTION, SOLUTION SUBCUTANEOUS
Qty: 3 ML | Refills: 3 | Status: SHIPPED | OUTPATIENT
Start: 2023-08-16

## 2023-08-16 NOTE — TELEPHONE ENCOUNTER
Please approve or deny this request.    Rx requested:  Requested Prescriptions     Pending Prescriptions Disp Refills    Benralizumab (FASENRA PEN) 30 MG/ML SOAJ 3 mL 3     Sig: INJECT 1 PEN UNDER THE SKIN EVERY 8 WEEKS.          Last Office Visit:   8/14/2023      Next Visit Date:  Future Appointments   Date Time Provider [facility] [address]   10/2/2023  2:00 PM Damion Trujillo MD [address] [place] [address]

## 2023-08-23 ENCOUNTER — TELEPHONE (OUTPATIENT)
Dept: PULMONOLOGY | Age: 58
End: 2023-08-23

## 2023-08-23 NOTE — TELEPHONE ENCOUNTER
PATIENT HAS NOT MET HER DEDUCTIBLE WITH HER INSURANCE. HER PORTION IS 2,000.00 AND SHE CAN NOT AFFORD THE CT SCAN AND PFT. SHE IS WONDERING IF THERE ARE OTHER TESTS YOU CAN ORDER THAT ARE NOT AS EXPENSIVE. SHE IS WONDERING WHAT ELSE YOU WANT HER TO DO. PLEASE ADVISE.

## 2023-10-02 ENCOUNTER — OFFICE VISIT (OUTPATIENT)
Dept: PULMONOLOGY | Age: 58
End: 2023-10-02
Payer: COMMERCIAL

## 2023-10-02 VITALS
WEIGHT: 171.4 LBS | RESPIRATION RATE: 16 BRPM | OXYGEN SATURATION: 98 % | DIASTOLIC BLOOD PRESSURE: 88 MMHG | HEIGHT: 65 IN | HEART RATE: 90 BPM | TEMPERATURE: 96.9 F | BODY MASS INDEX: 28.56 KG/M2 | SYSTOLIC BLOOD PRESSURE: 138 MMHG

## 2023-10-02 DIAGNOSIS — R05.3 CHRONIC COUGH: ICD-10-CM

## 2023-10-02 DIAGNOSIS — J30.2 SEASONAL ALLERGIES: ICD-10-CM

## 2023-10-02 DIAGNOSIS — K44.9 HIATAL HERNIA: ICD-10-CM

## 2023-10-02 DIAGNOSIS — K21.9 GASTROESOPHAGEAL REFLUX DISEASE WITHOUT ESOPHAGITIS: ICD-10-CM

## 2023-10-02 DIAGNOSIS — J45.50 SEVERE PERSISTENT ASTHMA, UNSPECIFIED WHETHER COMPLICATED: Primary | ICD-10-CM

## 2023-10-02 PROCEDURE — 99214 OFFICE O/P EST MOD 30 MIN: CPT | Performed by: INTERNAL MEDICINE

## 2023-10-02 PROCEDURE — 3079F DIAST BP 80-89 MM HG: CPT | Performed by: INTERNAL MEDICINE

## 2023-10-02 PROCEDURE — 3075F SYST BP GE 130 - 139MM HG: CPT | Performed by: INTERNAL MEDICINE

## 2023-10-02 RX ORDER — BUDESONIDE 0.5 MG/2ML
500 INHALANT ORAL 2 TIMES DAILY
Qty: 360 ML | Refills: 0 | Status: SHIPPED | OUTPATIENT
Start: 2023-10-02 | End: 2023-12-31

## 2023-10-29 RX ORDER — ONDANSETRON 4 MG/1
TABLET, ORALLY DISINTEGRATING ORAL
Qty: 9 TABLET | Refills: 0 | Status: SHIPPED | OUTPATIENT
Start: 2023-10-29

## 2023-11-28 DIAGNOSIS — J45.50 SEVERE PERSISTENT ASTHMA, UNSPECIFIED WHETHER COMPLICATED: ICD-10-CM

## 2023-11-29 RX ORDER — BENRALIZUMAB 30 MG/ML
INJECTION, SOLUTION SUBCUTANEOUS
Qty: 3 ML | Refills: 3 | COMMUNITY
Start: 2023-11-29

## 2023-12-29 RX ORDER — DOXYCYCLINE HYCLATE 100 MG
100 TABLET ORAL 2 TIMES DAILY
Qty: 14 TABLET | Refills: 0 | Status: SHIPPED | OUTPATIENT
Start: 2023-12-29 | End: 2024-01-05

## 2023-12-29 RX ORDER — PREDNISONE 10 MG/1
TABLET ORAL
Qty: 45 TABLET | Refills: 0 | Status: SHIPPED | OUTPATIENT
Start: 2023-12-29

## 2024-01-02 DIAGNOSIS — J45.50 SEVERE PERSISTENT ASTHMA, UNSPECIFIED WHETHER COMPLICATED: ICD-10-CM

## 2024-01-02 RX ORDER — BENRALIZUMAB 30 MG/ML
INJECTION, SOLUTION SUBCUTANEOUS
Qty: 1 ML | Refills: 0 | Status: SHIPPED | COMMUNITY
Start: 2024-01-02

## 2024-01-22 DIAGNOSIS — F41.9 ANXIETY: Primary | ICD-10-CM

## 2024-01-23 ENCOUNTER — TELEPHONE (OUTPATIENT)
Dept: FAMILY MEDICINE CLINIC | Age: 59
End: 2024-01-23

## 2024-01-23 DIAGNOSIS — F41.9 ANXIETY: ICD-10-CM

## 2024-01-23 LAB
AMPHET UR QL SCN: ABNORMAL
BARBITURATES UR QL SCN: ABNORMAL
BENZODIAZ UR QL SCN: ABNORMAL
CANNABINOIDS UR QL SCN: POSITIVE
COCAINE UR QL SCN: ABNORMAL
DRUG SCREEN COMMENT UR-IMP: ABNORMAL
FENTANYL SCREEN, URINE: ABNORMAL
METHADONE UR QL SCN: ABNORMAL
OPIATES UR QL SCN: ABNORMAL
OXYCODONE UR QL SCN: ABNORMAL
PCP UR QL SCN: ABNORMAL
PROPOXYPH UR QL SCN: ABNORMAL

## 2024-01-23 NOTE — TELEPHONE ENCOUNTER
I called patient to tell her that she has a Medication Controlled Substance Agreement to sign and also a Urine Drug screen. Unable to reach LMOM with instructions .   OFFICE VISIT      Patient: Danish Walsh Joshua Robison.  Seen at [facility] CMS/[facility]) 3/23/2017   • Atrial hypertrophy 3/23/2017   • Carotid artery plaque 6/15/2018   • Cellulitis    • CKD (chronic kidney disease)    • Critical lower limb ischemia (CMS/[facility]) 2019   • Diabetic retinopathy associated with type 2 diabetes mellitus (CMS/[facility]) 2018   • Diabetic wet gangrene of the foot (CMS/[facility]) 2018   • DM (diabetes mellitus) (CMS/[facility])    • Dyslipidemia    • ESRD (end stage renal disease) (CMS/[facility])    • Gangrene (CMS/[facility])    • CMS/[facility])    • Vaping Use: never used   Substance Use Topics   • Alcohol use: No   • Drug use: No       Review of Systems   Constitutional: Negative.    HENT: Negative.    Eyes: Negative.    Respiratory: Negative.    Cardiovascular: Negative.    Gastrointestinal: Negative.    Endocrine: Negative.    Genitourinary: Negative.    Musculoskeletal: Negative.    Skin: Negative.    Allergic/Immunologic: Negative.    Neurological: Negative.    Hematological: Negative.    Psychiatric/Behavioral: Negative.          OBJECTIVE:     Physical Exam  Vitals and nursing note reviewed.   Constitutional:       General: He is not in acute distress.  HENT:      Head: Normocephalic and atraumatic.      Nose: Nose normal.   Eyes:      Pupils: Pupils are equal, round, and reactive to light.   Cardiovascular:      Rate and Rhythm: Normal rate and regular rhythm.      Heart sounds: Normal heart sounds. No murmur heard.      Pulmonary:      Breath sounds: Normal breath sounds.   Abdominal:      General: Bowel sounds are normal.      Palpations: Abdomen is soft.   Musculoskeletal:         General: Normal range of motion.      Cervical back: Normal range of motion.      Comments: Edema of legs  And blistering on both feet ankles improving with dressing    Skin:     General: Skin is warm and dry.   Neurological:      Mental Status: He is alert and oriented to person, place, and time.      Deep Tendon Reflexes: Reflexes are normal and symmetric.         Visit Vitals  /48 (BP Location: LUE - Left upper extremity, Patient Position: Sitting, Cuff Size: Regular)   Pulse 81   Temp 95.7 °F (35.4 °C) (Tympanic)   Resp 18   Ht 6' (1.829 m)   Wt 100.3 kg (221 lb 0.2 oz)   SpO2 99%   BMI 29.97 kg/m²       DIAGNOSTIC STUDIES:   LAB RESULTS:  No results found for this visit on 12/07/21.    ASSESSMENT AND PLAN:       Problem List Items Addressed This Visit        Cardiac and Vasculature    PAD (peripheral artery disease) (CMS/[facility])    [facility])    [facility])    

## 2024-01-23 NOTE — TELEPHONE ENCOUNTER
I called the patient to let her know that Dr. Kerr says he can not have the xanax script until after we see what her urine drug scree results

## 2024-01-25 ASSESSMENT — ENCOUNTER SYMPTOMS
DIARRHEA: 0
SHORTNESS OF BREATH: 0
TROUBLE SWALLOWING: 0
EYE ITCHING: 0
CONSTIPATION: 0
WHEEZING: 0
APNEA: 0
NAUSEA: 0
ABDOMINAL PAIN: 0
BLOOD IN STOOL: 0
SORE THROAT: 0
SINUS PRESSURE: 0
EYE PAIN: 0
VOICE CHANGE: 0
EYE DISCHARGE: 0
EYE REDNESS: 0
BACK PAIN: 0
SINUS PAIN: 0
RHINORRHEA: 0
PHOTOPHOBIA: 0
FACIAL SWELLING: 0
COLOR CHANGE: 0
RECTAL PAIN: 0
COUGH: 0
ABDOMINAL DISTENTION: 0
CHEST TIGHTNESS: 0

## 2024-01-25 NOTE — PROGRESS NOTES
folic acid (FOLVITE) 400 MCG tablet TAKE ONE TABLET BY MOUTH EVERY DAY 90 tablet 1    pravastatin (PRAVACHOL) 40 MG tablet TAKE ONE TABLET BY MOUTH EVERY DAY 90 tablet 1    vitamin B-12 (CYANOCOBALAMIN) 100 MCG tablet TAKE ONE TABLET BY MOUTH EVERY DAY 30 tablet 0    loratadine (CLARITIN) 10 MG tablet Take 1 tablet by mouth daily 30 tablet 3    Respiratory Therapy Supplies (NEBULIZER/TUBING/MOUTHPIECE) KIT 1 kit by Does not apply route daily as needed (use as directed) 1 kit 0    Spacer/Aero-Holding Chambers (E-Z SPACER) GASTON Us as directed for asthma 1 Device 0     No current facility-administered medications on file prior to visit.      Bacitracin, Butalbital-aspirin-caffeine, Codeine, Floxin [ofloxacin], Rocephin [ceftriaxone sodium], Tomato, Asa [aspirin], Lisinopril, and Azithromycin  Past Medical History:   Diagnosis Date    Allergic rhinitis     Anxiety disorder     Cleft lip     Divergent strabismus     Diverticulitis 2013    s/p partial colectomy, Dr Ortiz    Hyperlipidemia LDL goal <100     Hypertension     Obesity (BMI 30-39.9)     Severe persistent asthma without complication     intubated x 2013    Type 2 diabetes mellitus (HCC) 2020    A1C 6.8     Past Surgical History:   Procedure Laterality Date     SECTION      x 3    CLEFT LIP REPAIR      COLONOSCOPY      COLONOSCOPY N/A 2023    COLONOSCOPY DIAGNOSTIC performed by Maxi Cervantes MD at [facility]    OTHER SURGICAL HISTORY  2013    Exploratory laparotomy with sigmoid colectomy, drainage pelvic abscess and formation end descending colostomy    OTHER SURGICAL HISTORY  2014    Exploratory lapartomy with extensived lysis of adhesions, takedown of colostmy with primary stapled anastomosis, rigid sigmoidoscopy    UPPER GASTROINTESTINAL ENDOSCOPY N/A 2023    EGD DIAGNOSTIC ONLY performed by Maxi Cervantes MD at [facility]     Social History     Socioeconomic History    Marital status:

## 2024-01-26 ENCOUNTER — TELEMEDICINE (OUTPATIENT)
Dept: FAMILY MEDICINE CLINIC | Age: 59
End: 2024-01-26

## 2024-01-26 DIAGNOSIS — R89.2 ABNORMAL TOXICOLOGICAL FINDINGS: ICD-10-CM

## 2024-01-26 DIAGNOSIS — N39.0 URINARY TRACT INFECTION WITHOUT HEMATURIA, SITE UNSPECIFIED: Primary | ICD-10-CM

## 2024-01-26 RX ORDER — NITROFURANTOIN 25; 75 MG/1; MG/1
100 CAPSULE ORAL 2 TIMES DAILY
Qty: 10 CAPSULE | Refills: 0 | Status: SHIPPED | OUTPATIENT
Start: 2024-01-26 | End: 2024-01-31

## 2024-01-28 PROBLEM — E11.9 TYPE 2 DIABETES MELLITUS WITHOUT COMPLICATION, WITHOUT LONG-TERM CURRENT USE OF INSULIN (HCC): Status: RESOLVED | Noted: 2021-02-10 | Resolved: 2024-01-28

## 2024-02-16 RX ORDER — IBUPROFEN 800 MG/1
800 TABLET ORAL 2 TIMES DAILY PRN
Qty: 120 TABLET | Refills: 1 | Status: SHIPPED | OUTPATIENT
Start: 2024-02-16

## 2024-02-20 ENCOUNTER — HOSPITAL ENCOUNTER (OUTPATIENT)
Dept: PULMONOLOGY | Age: 59
Discharge: HOME | End: 2024-02-20
Payer: COMMERCIAL

## 2024-02-20 ENCOUNTER — HOSPITAL ENCOUNTER (OUTPATIENT)
Dept: CT IMAGING | Age: 59
Discharge: HOME | End: 2024-02-22
Attending: INTERNAL MEDICINE
Payer: COMMERCIAL

## 2024-02-20 DIAGNOSIS — J45.50 SEVERE PERSISTENT ASTHMA, UNSPECIFIED WHETHER COMPLICATED: ICD-10-CM

## 2024-02-20 DIAGNOSIS — R05.3 CHRONIC COUGH: ICD-10-CM

## 2024-02-20 PROCEDURE — 71250 CT THORAX DX C-: CPT

## 2024-02-20 PROCEDURE — 6360000002 HC RX W HCPCS

## 2024-02-20 PROCEDURE — 94729 DIFFUSING CAPACITY: CPT

## 2024-02-20 PROCEDURE — 94060 EVALUATION OF WHEEZING: CPT

## 2024-02-20 PROCEDURE — 94726 PLETHYSMOGRAPHY LUNG VOLUMES: CPT

## 2024-02-20 RX ORDER — ALBUTEROL SULFATE 2.5 MG/3ML
SOLUTION RESPIRATORY (INHALATION)
Status: COMPLETED
Start: 2024-02-20 | End: 2024-02-20

## 2024-02-20 RX ORDER — BENRALIZUMAB 30 MG/ML
INJECTION, SOLUTION SUBCUTANEOUS
Qty: 1 ML | Refills: 0 | COMMUNITY
Start: 2024-02-20

## 2024-02-20 RX ADMIN — ALBUTEROL SULFATE 2.5 MG: 2.5 SOLUTION RESPIRATORY (INHALATION) at 09:24

## 2024-03-01 RX ORDER — PREDNISONE 10 MG/1
TABLET ORAL
Qty: 45 TABLET | Refills: 0 | Status: SHIPPED | OUTPATIENT
Start: 2024-03-01

## 2024-03-05 RX ORDER — AMLODIPINE BESYLATE 10 MG/1
10 TABLET ORAL DAILY
Qty: 90 TABLET | Refills: 1 | Status: SHIPPED | OUTPATIENT
Start: 2024-03-05

## 2024-03-05 NOTE — TELEPHONE ENCOUNTER
Future Appointments    Encounter Information   Provider Department Appt Notes   3/6/2024 Gregg Taylor MD [facility] Pulmonology F/U     Past Visits    Date Provider Specialty Visit Type Primary Dx   01/26/2024 Deon Kerr MD Family Medicine Telemedicine Urinary tract infection without hematuria, site unspecified

## 2024-03-06 ENCOUNTER — OFFICE VISIT (OUTPATIENT)
Dept: PULMONOLOGY | Age: 59
End: 2024-03-06
Payer: COMMERCIAL

## 2024-03-06 VITALS
HEIGHT: 65 IN | BODY MASS INDEX: 29.26 KG/M2 | DIASTOLIC BLOOD PRESSURE: 80 MMHG | WEIGHT: 175.6 LBS | RESPIRATION RATE: 16 BRPM | SYSTOLIC BLOOD PRESSURE: 138 MMHG | HEART RATE: 96 BPM | TEMPERATURE: 97.3 F | OXYGEN SATURATION: 97 %

## 2024-03-06 DIAGNOSIS — J30.2 SEASONAL ALLERGIES: ICD-10-CM

## 2024-03-06 DIAGNOSIS — J45.50 SEVERE PERSISTENT ASTHMA, UNSPECIFIED WHETHER COMPLICATED: ICD-10-CM

## 2024-03-06 DIAGNOSIS — K21.9 GASTROESOPHAGEAL REFLUX DISEASE WITHOUT ESOPHAGITIS: ICD-10-CM

## 2024-03-06 DIAGNOSIS — J45.50 SEVERE PERSISTENT ASTHMA, UNSPECIFIED WHETHER COMPLICATED: Primary | ICD-10-CM

## 2024-03-06 DIAGNOSIS — K44.9 HIATAL HERNIA: Primary | ICD-10-CM

## 2024-03-06 DIAGNOSIS — R05.3 CHRONIC COUGH: ICD-10-CM

## 2024-03-06 PROBLEM — E11.9 TYPE 2 DIABETES MELLITUS (HCC): Status: ACTIVE | Noted: 2024-03-06

## 2024-03-06 PROCEDURE — 3075F SYST BP GE 130 - 139MM HG: CPT | Performed by: INTERNAL MEDICINE

## 2024-03-06 PROCEDURE — 3079F DIAST BP 80-89 MM HG: CPT | Performed by: INTERNAL MEDICINE

## 2024-03-06 PROCEDURE — 99215 OFFICE O/P EST HI 40 MIN: CPT | Performed by: INTERNAL MEDICINE

## 2024-03-06 RX ORDER — BENRALIZUMAB 30 MG/ML
INJECTION, SOLUTION SUBCUTANEOUS
Qty: 1 ML | Refills: 0 | Status: SHIPPED | OUTPATIENT
Start: 2024-03-06

## 2024-03-06 RX ORDER — PREDNISONE 10 MG/1
40 TABLET ORAL DAILY
Qty: 20 TABLET | Refills: 0 | Status: SHIPPED | OUTPATIENT
Start: 2024-03-06 | End: 2024-03-11

## 2024-03-06 RX ORDER — MONTELUKAST SODIUM 10 MG/1
TABLET ORAL
Qty: 30 TABLET | Refills: 3 | Status: SHIPPED | OUTPATIENT
Start: 2024-03-06

## 2024-03-06 RX ORDER — BENRALIZUMAB 30 MG/ML
30 INJECTION, SOLUTION SUBCUTANEOUS
Qty: 1 ADJUSTABLE DOSE PRE-FILLED PEN SYRINGE | Refills: 5 | Status: SHIPPED | OUTPATIENT
Start: 2024-03-06

## 2024-03-06 RX ORDER — AZITHROMYCIN 250 MG/1
TABLET, FILM COATED ORAL
Qty: 6 TABLET | Refills: 0 | Status: SHIPPED | OUTPATIENT
Start: 2024-03-06 | End: 2024-03-16

## 2024-03-06 NOTE — PATIENT INSTRUCTIONS
Patient Education        Gastroesophageal Reflux Disease (GERD): Care Instructions  Overview     Gastroesophageal reflux disease (GERD) is the backward flow of stomach acid into the esophagus. The esophagus is the tube that leads from your throat to your stomach. A one-way valve prevents the stomach acid from backing up into this tube. But when you have GERD, this valve does not close tightly enough. This can also cause pain and swelling in your esophagus. (This is called esophagitis.)  If you have mild GERD symptoms including heartburn, you may be able to control the problem with antacids or over-the-counter medicine. You can also make lifestyle changes to help reduce your symptoms. These include changing your diet and eating habits, such as not eating late at night and losing weight.  Follow-up care is a key part of your treatment and safety. Be sure to make and go to all appointments, and call your doctor if you are having problems. It's also a good idea to know your test results and keep a list of the medicines you take.  How can you care for yourself at home?  Take your medicines exactly as prescribed. Call your doctor if you think you are having a problem with your medicine.  Your doctor may recommend over-the-counter medicine. For mild or occasional indigestion, antacids, such as Tums, Gaviscon, Mylanta, or Maalox, may help. Your doctor also may recommend over-the-counter acid reducers, such as famotidine (Pepcid AC), cimetidine (Tagamet HB), or omeprazole (Prilosec). Read and follow all instructions on the label. If you use these medicines often, talk with your doctor.  Change your eating habits.  It's best to eat several small meals instead of two or three large meals.  After you eat, wait 2 to 3 hours before you lie down.  Avoid foods that make your symptoms worse. These may include chocolate, mint, alcohol, pepper, spicy foods, high-fat foods, or drinks with caffeine in them, such as tea, coffee, myra,

## 2024-03-06 NOTE — PROGRESS NOTES
FEV1 57% FEV1/FVC 0.57   Home sleep study 2022 negative  Assessment and Plan       Diagnosis Orders   1. Hiatal hernia  Drew Kimbrough MD, Cardiothoracic Surgery, [place]      2. Seasonal allergies        3. Severe persistent asthma, unspecified whether complicated  predniSONE (DELTASONE) 10 MG tablet    azithromycin (ZITHROMAX) 250 MG tablet    montelukast (SINGULAIR) 10 MG tablet      4. Chronic cough        5. Gastroesophageal reflux disease without esophagitis          Asthma, symptoms not controlled, currently with acute exacerbation at risk of severe disease and hospitalization.  Prednisone 40 mg daily for 5 days and Z-Juan she has no allergy to Z-Juan reports no rash and she did use it previously.  Patient instructed to call me and come to emergency room if it not improved in 1 to 2 days  On maximum treatment, Fasenra, Singulair, budesonide nebs, and Breztri  Chronic reflux could be contributing  Recommended surgical evaluation to fix hiatal hernia, patient is agreeable to meet surgeon, however she is undecided regarding surgery.   referral placed  Continue as needed albuterol  Yearly flu shot    continue PPI  Continue Flonase      Orders Placed This Encounter   Procedures    Drew Kimbrough MD, Cardiothoracic Surgery [place]     Referral Priority:   Routine     Referral Type:   Eval and Treat     Referral Reason:   Specialty Services Required     Referred to Provider:   Drew Haynes MD     Requested Specialty:   Cardiothoracic Surgery     Number of Visits Requested:   1     Orders Placed This Encounter   Medications    predniSONE (DELTASONE) 10 MG tablet     Sig: Take 4 tablets by mouth daily for 5 days     Dispense:  20 tablet     Refill:  0    azithromycin (ZITHROMAX) 250 MG tablet     Simg on day 1 followed by 250mg on days 2 - 5     Dispense:  6 tablet     Refill:  0    montelukast (SINGULAIR) 10 MG tablet     Sig: TAKE 1 TABLET BY MOUTH EVERY NIGHT AT BEDTIME     Dispense:  30

## 2024-03-08 ENCOUNTER — TELEPHONE (OUTPATIENT)
Dept: FAMILY MEDICINE CLINIC | Age: 59
End: 2024-03-08

## 2024-03-29 ENCOUNTER — TELEPHONE (OUTPATIENT)
Dept: PULMONOLOGY | Age: 59
End: 2024-03-29

## 2024-03-29 DIAGNOSIS — B37.0 ORAL CANDIDA: ICD-10-CM

## 2024-03-29 NOTE — TELEPHONE ENCOUNTER
Future Appointments    Encounter Information   Provider Department Appt Notes   4/17/2024 Drew Haynes MD [facility] Thoracic Surgery hiatal hernia   7/8/2024 Gregg Taylor MD [facility] Pulmonology 4M FU     Past Visits    Date Provider Specialty Visit Type Primary Dx   03/06/2024 Gregg Taylor MD Pulmonology Office Visit Hiatal hernia   01/26/2024 Deon Kerr MD Family Medicine Telemedicine Urinary tract infection without hematuria, site unspecified

## 2024-03-29 NOTE — TELEPHONE ENCOUNTER
PATIENT STATES SHE HAS THRUSH IN HER MOUTH. SHE IS ASKING FOR SOMETHING TO BE SENT TO THE PHARMACY FOR THIS.

## 2024-04-01 ENCOUNTER — TELEPHONE (OUTPATIENT)
Dept: PULMONOLOGY | Age: 59
End: 2024-04-01

## 2024-04-01 NOTE — TELEPHONE ENCOUNTER
[facility] CALLED IN TO THE OFFICE TO CLARIFY PTS DOSAGE CHANGE ON FASENRA. PT WAS TAKING 1 PEN EVERY 8 WEEKS THE MOST RECENT RX STATES EVERY 30 DAYS.       PLEASE CALL [facility] BACK AT [phone] OR FAX A CLARIFICATION TO [phone]      PLEASE ADVISE

## 2024-04-02 DIAGNOSIS — J45.50 SEVERE PERSISTENT ASTHMA, UNSPECIFIED WHETHER COMPLICATED: Primary | ICD-10-CM

## 2024-04-02 NOTE — TELEPHONE ENCOUNTER
I SPOKE TO PTS INSURANCE. SHE DOES HAVE BCBS THE ACTIVE COVERAGE IS IN HER CHART. SHE IS FAXING ME A COPY OF THE INSURANCE CARD.      PTS FASJILLIANPATTIE NEEDS TO GO TO [facility] SHE DOES NOT USE [facility] ANYMORE.       PLEASE ADVISE

## 2024-04-02 NOTE — TELEPHONE ENCOUNTER
No fasenra is every 4 weeks for the first 3 dosages then every 8 weeks after. Patient has been on this for a couple of years.

## 2024-04-03 RX ORDER — BENRALIZUMAB 30 MG/ML
1 INJECTION, SOLUTION SUBCUTANEOUS SEE ADMIN INSTRUCTIONS
Qty: 0.28 ML | Refills: 5 | Status: SHIPPED | OUTPATIENT
Start: 2024-04-03 | End: 2024-06-02

## 2024-04-30 DIAGNOSIS — J45.50 SEVERE PERSISTENT ASTHMA, UNSPECIFIED WHETHER COMPLICATED: ICD-10-CM

## 2024-05-01 RX ORDER — BENRALIZUMAB 30 MG/ML
30 INJECTION, SOLUTION SUBCUTANEOUS
Qty: 1 ADJUSTABLE DOSE PRE-FILLED PEN SYRINGE | Refills: 5 | Status: SHIPPED | COMMUNITY
Start: 2024-05-01

## 2024-05-14 RX ORDER — PREDNISONE 10 MG/1
TABLET ORAL
Qty: 45 TABLET | Refills: 0 | Status: SHIPPED | OUTPATIENT
Start: 2024-05-14

## 2024-05-14 NOTE — TELEPHONE ENCOUNTER
Rx requested:  Requested Prescriptions     Pending Prescriptions Disp Refills    predniSONE (DELTASONE) 10 MG tablet [Pharmacy Med Name: PREDNISONE 10MG TABS] 45 tablet 0     Sig: TAKE 5 TABLETS BY MOUTH DAILY FOR 3 DAYS, 4 TABLETS DAILY FOR 3 DAYS, 3 TABLETS DAILY FOR 3 DAYS, 2 TABLETS DAILY FOR 3 DAYS, 1 TABLET DAILY FOR 3 DAYS         Last Office Visit:   1/26/2024      Next Visit Date:  Future Appointments   Date Time Provider Department Center   7/8/2024  9:00 AM Gregg Taylor MD [facility]

## 2024-06-19 ENCOUNTER — APPOINTMENT (OUTPATIENT)
Dept: ULTRASOUND IMAGING | Age: 59
End: 2024-06-19
Payer: COMMERCIAL

## 2024-06-19 ENCOUNTER — APPOINTMENT (OUTPATIENT)
Dept: GENERAL RADIOLOGY | Age: 59
End: 2024-06-19
Payer: COMMERCIAL

## 2024-06-19 ENCOUNTER — HOSPITAL ENCOUNTER (INPATIENT)
Age: 59
LOS: 2 days | Discharge: HOME | End: 2024-06-21
Attending: STUDENT IN AN ORGANIZED HEALTH CARE EDUCATION/TRAINING PROGRAM | Admitting: STUDENT IN AN ORGANIZED HEALTH CARE EDUCATION/TRAINING PROGRAM
Payer: COMMERCIAL

## 2024-06-19 ENCOUNTER — APPOINTMENT (OUTPATIENT)
Dept: CT IMAGING | Age: 59
End: 2024-06-19
Payer: COMMERCIAL

## 2024-06-19 DIAGNOSIS — I26.09 ACUTE PULMONARY EMBOLISM WITH ACUTE COR PULMONALE, UNSPECIFIED PULMONARY EMBOLISM TYPE (HCC): Primary | ICD-10-CM

## 2024-06-19 DIAGNOSIS — J45.50 SEVERE PERSISTENT ASTHMA, UNSPECIFIED WHETHER COMPLICATED: ICD-10-CM

## 2024-06-19 DIAGNOSIS — R06.02 SHORTNESS OF BREATH: ICD-10-CM

## 2024-06-19 LAB
ALBUMIN SERPL-MCNC: 4 G/DL (ref 3.5–4.6)
ALP SERPL-CCNC: 80 U/L (ref 40–130)
ALT SERPL-CCNC: 11 U/L (ref 0–33)
ANION GAP SERPL CALCULATED.3IONS-SCNC: 14 MEQ/L (ref 9–15)
APTT PPP: 30.8 SEC (ref 24.4–36.8)
AST SERPL-CCNC: 18 U/L (ref 0–35)
BASE EXCESS ARTERIAL: 3 (ref -3–3)
BASOPHILS # BLD: 0 K/UL (ref 0–0.2)
BASOPHILS NFR BLD: 0.4 %
BILIRUB SERPL-MCNC: 1.1 MG/DL (ref 0.2–0.7)
BUN SERPL-MCNC: 9 MG/DL (ref 6–20)
CALCIUM IONIZED: 1.21 MMOL/L (ref 1.12–1.32)
CALCIUM SERPL-MCNC: 9.2 MG/DL (ref 8.5–9.9)
CHLORIDE SERPL-SCNC: 103 MEQ/L (ref 95–107)
CO2 SERPL-SCNC: 23 MEQ/L (ref 20–31)
CREAT SERPL-MCNC: 0.62 MG/DL (ref 0.5–0.9)
EOSINOPHIL # BLD: 0 K/UL (ref 0–0.7)
EOSINOPHIL NFR BLD: 0 %
ERYTHROCYTE [DISTWIDTH] IN BLOOD BY AUTOMATED COUNT: 13.6 % (ref 11.5–14.5)
GLOBULIN SER CALC-MCNC: 2.8 G/DL (ref 2.3–3.5)
GLUCOSE BLD-MCNC: 135 MG/DL (ref 70–99)
GLUCOSE SERPL-MCNC: 137 MG/DL (ref 70–99)
HCO3 ARTERIAL: 26.7 MMOL/L (ref 21–29)
HCT VFR BLD AUTO: 38.6 % (ref 37–47)
HCT VFR BLD AUTO: 42 % (ref 36–48)
HGB BLD CALC-MCNC: 14.4 GM/DL (ref 12–16)
HGB BLD-MCNC: 13.7 G/DL (ref 12–16)
INR PPP: 1.1
LACTATE: 1.13 MMOL/L (ref 0.4–2)
LYMPHOCYTES # BLD: 1.7 K/UL (ref 1–4.8)
LYMPHOCYTES NFR BLD: 30.7 %
MAGNESIUM SERPL-MCNC: 2.7 MG/DL (ref 1.7–2.4)
MCH RBC QN AUTO: 34.7 PG (ref 27–31.3)
MCHC RBC AUTO-ENTMCNC: 35.5 % (ref 33–37)
MCV RBC AUTO: 97.7 FL (ref 79.4–94.8)
MONOCYTES # BLD: 0.3 K/UL (ref 0.2–0.8)
MONOCYTES NFR BLD: 6.1 %
NEUTROPHILS # BLD: 3.4 K/UL (ref 1.4–6.5)
NEUTS SEG NFR BLD: 62.6 %
O2 SAT, ARTERIAL: 98 % (ref 93–100)
PCO2 ARTERIAL: 36 MM HG (ref 35–45)
PERFORMED ON: ABNORMAL
PH ARTERIAL: 7.48 (ref 7.35–7.45)
PLATELET # BLD AUTO: 360 K/UL (ref 130–400)
PO2 ARTERIAL: 102 MM HG (ref 75–108)
POC CHLORIDE: 105 MEQ/L (ref 99–110)
POC CREATININE: 0.7 MG/DL (ref 0.6–1.2)
POC FIO2: 5
POC SAMPLE TYPE: ABNORMAL
POTASSIUM SERPL-SCNC: 3.4 MEQ/L (ref 3.5–5.1)
POTASSIUM SERPL-SCNC: 3.5 MEQ/L (ref 3.4–4.9)
PROT SERPL-MCNC: 6.8 G/DL (ref 6.3–8)
PROTHROMBIN TIME: 13.9 SEC (ref 12.3–14.9)
RBC # BLD AUTO: 3.95 M/UL (ref 4.2–5.4)
SODIUM BLD-SCNC: 142 MEQ/L (ref 136–145)
SODIUM SERPL-SCNC: 140 MEQ/L (ref 135–144)
TCO2 ARTERIAL: 28 MMOL/L (ref 21–32)
TROPONIN, HIGH SENSITIVITY: 103 NG/L (ref 0–19)
TROPONIN, HIGH SENSITIVITY: 117 NG/L (ref 0–19)
TROPONIN, HIGH SENSITIVITY: 60 NG/L (ref 0–19)
WBC # BLD AUTO: 5.4 K/UL (ref 4.8–10.8)

## 2024-06-19 PROCEDURE — 85730 THROMBOPLASTIN TIME PARTIAL: CPT

## 2024-06-19 PROCEDURE — 6360000002 HC RX W HCPCS: Performed by: STUDENT IN AN ORGANIZED HEALTH CARE EDUCATION/TRAINING PROGRAM

## 2024-06-19 PROCEDURE — 6360000004 HC RX CONTRAST MEDICATION

## 2024-06-19 PROCEDURE — 36600 WITHDRAWAL OF ARTERIAL BLOOD: CPT

## 2024-06-19 PROCEDURE — 93970 EXTREMITY STUDY: CPT

## 2024-06-19 PROCEDURE — 85306 CLOT INHIBIT PROT S FREE: CPT

## 2024-06-19 PROCEDURE — 99285 EMERGENCY DEPT VISIT HI MDM: CPT

## 2024-06-19 PROCEDURE — 83605 ASSAY OF LACTIC ACID: CPT

## 2024-06-19 PROCEDURE — 2580000003 HC RX 258

## 2024-06-19 PROCEDURE — 6370000000 HC RX 637 (ALT 250 FOR IP)

## 2024-06-19 PROCEDURE — 6360000002 HC RX W HCPCS

## 2024-06-19 PROCEDURE — 84484 ASSAY OF TROPONIN QUANT: CPT

## 2024-06-19 PROCEDURE — 80053 COMPREHEN METABOLIC PANEL: CPT

## 2024-06-19 PROCEDURE — 36415 COLL VENOUS BLD VENIPUNCTURE: CPT

## 2024-06-19 PROCEDURE — 83735 ASSAY OF MAGNESIUM: CPT

## 2024-06-19 PROCEDURE — 94761 N-INVAS EAR/PLS OXIMETRY MLT: CPT

## 2024-06-19 PROCEDURE — 85025 COMPLETE CBC W/AUTO DIFF WBC: CPT

## 2024-06-19 PROCEDURE — 71045 X-RAY EXAM CHEST 1 VIEW: CPT

## 2024-06-19 PROCEDURE — 84295 ASSAY OF SERUM SODIUM: CPT

## 2024-06-19 PROCEDURE — 2580000003 HC RX 258: Performed by: STUDENT IN AN ORGANIZED HEALTH CARE EDUCATION/TRAINING PROGRAM

## 2024-06-19 PROCEDURE — 82565 ASSAY OF CREATININE: CPT

## 2024-06-19 PROCEDURE — 82435 ASSAY OF BLOOD CHLORIDE: CPT

## 2024-06-19 PROCEDURE — 85014 HEMATOCRIT: CPT

## 2024-06-19 PROCEDURE — 82803 BLOOD GASES ANY COMBINATION: CPT

## 2024-06-19 PROCEDURE — 2700000000 HC OXYGEN THERAPY PER DAY

## 2024-06-19 PROCEDURE — 93005 ELECTROCARDIOGRAM TRACING: CPT

## 2024-06-19 PROCEDURE — 71275 CT ANGIOGRAPHY CHEST: CPT

## 2024-06-19 PROCEDURE — 2060000000 HC ICU INTERMEDIATE R&B

## 2024-06-19 PROCEDURE — 94640 AIRWAY INHALATION TREATMENT: CPT

## 2024-06-19 PROCEDURE — 81241 F5 GENE: CPT

## 2024-06-19 PROCEDURE — 6370000000 HC RX 637 (ALT 250 FOR IP): Performed by: STUDENT IN AN ORGANIZED HEALTH CARE EDUCATION/TRAINING PROGRAM

## 2024-06-19 PROCEDURE — 84132 ASSAY OF SERUM POTASSIUM: CPT

## 2024-06-19 PROCEDURE — 82330 ASSAY OF CALCIUM: CPT

## 2024-06-19 PROCEDURE — 85610 PROTHROMBIN TIME: CPT

## 2024-06-19 PROCEDURE — 85303 CLOT INHIBIT PROT C ACTIVITY: CPT

## 2024-06-19 PROCEDURE — 96372 THER/PROPH/DIAG INJ SC/IM: CPT

## 2024-06-19 PROCEDURE — 96374 THER/PROPH/DIAG INJ IV PUSH: CPT

## 2024-06-19 PROCEDURE — 96375 TX/PRO/DX INJ NEW DRUG ADDON: CPT

## 2024-06-19 RX ORDER — ALPRAZOLAM 0.5 MG/1
0.5 TABLET ORAL NIGHTLY PRN
Status: DISCONTINUED | OUTPATIENT
Start: 2024-06-19 | End: 2024-06-21 | Stop reason: HOSPADM

## 2024-06-19 RX ORDER — NITROGLYCERIN 0.4 MG/1
0.4 TABLET SUBLINGUAL EVERY 5 MIN PRN
Status: DISCONTINUED | OUTPATIENT
Start: 2024-06-19 | End: 2024-06-21 | Stop reason: HOSPADM

## 2024-06-19 RX ORDER — ACETAMINOPHEN 325 MG/1
650 TABLET ORAL EVERY 6 HOURS PRN
Status: DISCONTINUED | OUTPATIENT
Start: 2024-06-19 | End: 2024-06-21 | Stop reason: HOSPADM

## 2024-06-19 RX ORDER — LORAZEPAM 1 MG/1
1 TABLET ORAL
Status: DISCONTINUED | OUTPATIENT
Start: 2024-06-19 | End: 2024-06-21 | Stop reason: HOSPADM

## 2024-06-19 RX ORDER — 0.9 % SODIUM CHLORIDE 0.9 %
1000 INTRAVENOUS SOLUTION INTRAVENOUS ONCE
Status: COMPLETED | OUTPATIENT
Start: 2024-06-19 | End: 2024-06-19

## 2024-06-19 RX ORDER — SODIUM CHLORIDE 9 MG/ML
INJECTION, SOLUTION INTRAVENOUS PRN
Status: DISCONTINUED | OUTPATIENT
Start: 2024-06-19 | End: 2024-06-21 | Stop reason: HOSPADM

## 2024-06-19 RX ORDER — FOLIC ACID 1 MG/1
1 TABLET ORAL DAILY
Status: DISCONTINUED | OUTPATIENT
Start: 2024-06-19 | End: 2024-06-19 | Stop reason: CLARIF

## 2024-06-19 RX ORDER — PRAVASTATIN SODIUM 40 MG
40 TABLET ORAL DAILY
Status: DISCONTINUED | OUTPATIENT
Start: 2024-06-20 | End: 2024-06-21 | Stop reason: HOSPADM

## 2024-06-19 RX ORDER — FLUTICASONE PROPIONATE 50 MCG
1 SPRAY, SUSPENSION (ML) NASAL DAILY
Status: DISCONTINUED | OUTPATIENT
Start: 2024-06-20 | End: 2024-06-21 | Stop reason: HOSPADM

## 2024-06-19 RX ORDER — LORAZEPAM 2 MG/ML
1 INJECTION INTRAMUSCULAR ONCE
Status: COMPLETED | OUTPATIENT
Start: 2024-06-19 | End: 2024-06-19

## 2024-06-19 RX ORDER — ONDANSETRON 4 MG/1
4 TABLET, ORALLY DISINTEGRATING ORAL EVERY 8 HOURS PRN
Status: DISCONTINUED | OUTPATIENT
Start: 2024-06-19 | End: 2024-06-21 | Stop reason: HOSPADM

## 2024-06-19 RX ORDER — BUDESONIDE AND FORMOTEROL FUMARATE DIHYDRATE 160; 4.5 UG/1; UG/1
2 AEROSOL RESPIRATORY (INHALATION)
Status: DISCONTINUED | OUTPATIENT
Start: 2024-06-19 | End: 2024-06-21 | Stop reason: HOSPADM

## 2024-06-19 RX ORDER — AZELASTINE 1 MG/ML
1 SPRAY, METERED NASAL 2 TIMES DAILY PRN
Status: DISCONTINUED | OUTPATIENT
Start: 2024-06-19 | End: 2024-06-19 | Stop reason: CLARIF

## 2024-06-19 RX ORDER — ONDANSETRON 2 MG/ML
4 INJECTION INTRAMUSCULAR; INTRAVENOUS EVERY 6 HOURS PRN
Status: DISCONTINUED | OUTPATIENT
Start: 2024-06-19 | End: 2024-06-21 | Stop reason: HOSPADM

## 2024-06-19 RX ORDER — LORAZEPAM 2 MG/ML
4 INJECTION INTRAMUSCULAR
Status: DISCONTINUED | OUTPATIENT
Start: 2024-06-19 | End: 2024-06-21 | Stop reason: HOSPADM

## 2024-06-19 RX ORDER — ALBUTEROL SULFATE 2.5 MG/3ML
2.5 SOLUTION RESPIRATORY (INHALATION) EVERY 4 HOURS PRN
Status: DISCONTINUED | OUTPATIENT
Start: 2024-06-19 | End: 2024-06-21 | Stop reason: HOSPADM

## 2024-06-19 RX ORDER — LORAZEPAM 2 MG/ML
3 INJECTION INTRAMUSCULAR
Status: DISCONTINUED | OUTPATIENT
Start: 2024-06-19 | End: 2024-06-21 | Stop reason: HOSPADM

## 2024-06-19 RX ORDER — LORAZEPAM 2 MG/ML
2 INJECTION INTRAMUSCULAR
Status: DISCONTINUED | OUTPATIENT
Start: 2024-06-19 | End: 2024-06-21 | Stop reason: HOSPADM

## 2024-06-19 RX ORDER — SODIUM CHLORIDE 9 MG/ML
INJECTION, SOLUTION INTRAVENOUS
Status: DISPENSED
Start: 2024-06-19 | End: 2024-06-20

## 2024-06-19 RX ORDER — LORAZEPAM 1 MG/1
4 TABLET ORAL
Status: DISCONTINUED | OUTPATIENT
Start: 2024-06-19 | End: 2024-06-21 | Stop reason: HOSPADM

## 2024-06-19 RX ORDER — ENOXAPARIN SODIUM 100 MG/ML
1 INJECTION SUBCUTANEOUS ONCE
Status: COMPLETED | OUTPATIENT
Start: 2024-06-19 | End: 2024-06-19

## 2024-06-19 RX ORDER — SODIUM CHLORIDE 0.9 % (FLUSH) 0.9 %
5-40 SYRINGE (ML) INJECTION PRN
Status: DISCONTINUED | OUTPATIENT
Start: 2024-06-19 | End: 2024-06-21 | Stop reason: HOSPADM

## 2024-06-19 RX ORDER — MAGNESIUM SULFATE IN WATER 40 MG/ML
2000 INJECTION, SOLUTION INTRAVENOUS PRN
Status: DISCONTINUED | OUTPATIENT
Start: 2024-06-19 | End: 2024-06-21 | Stop reason: HOSPADM

## 2024-06-19 RX ORDER — PANTOPRAZOLE SODIUM 40 MG/1
40 TABLET, DELAYED RELEASE ORAL DAILY
Status: DISCONTINUED | OUTPATIENT
Start: 2024-06-20 | End: 2024-06-21 | Stop reason: HOSPADM

## 2024-06-19 RX ORDER — FENTANYL CITRATE 0.05 MG/ML
50 INJECTION, SOLUTION INTRAMUSCULAR; INTRAVENOUS ONCE
Status: COMPLETED | OUTPATIENT
Start: 2024-06-19 | End: 2024-06-19

## 2024-06-19 RX ORDER — LORAZEPAM 1 MG/1
2 TABLET ORAL
Status: DISCONTINUED | OUTPATIENT
Start: 2024-06-19 | End: 2024-06-21 | Stop reason: HOSPADM

## 2024-06-19 RX ORDER — IBUPROFEN 400 MG/1
800 TABLET ORAL 2 TIMES DAILY PRN
Status: DISCONTINUED | OUTPATIENT
Start: 2024-06-19 | End: 2024-06-21 | Stop reason: HOSPADM

## 2024-06-19 RX ORDER — ESCITALOPRAM OXALATE 10 MG/1
10 TABLET ORAL DAILY
Status: DISCONTINUED | OUTPATIENT
Start: 2024-06-20 | End: 2024-06-21 | Stop reason: HOSPADM

## 2024-06-19 RX ORDER — POLYETHYLENE GLYCOL 3350 17 G/17G
17 POWDER, FOR SOLUTION ORAL DAILY PRN
Status: DISCONTINUED | OUTPATIENT
Start: 2024-06-19 | End: 2024-06-21 | Stop reason: HOSPADM

## 2024-06-19 RX ORDER — LANOLIN ALCOHOL/MO/W.PET/CERES
100 CREAM (GRAM) TOPICAL DAILY
Status: DISCONTINUED | OUTPATIENT
Start: 2024-06-19 | End: 2024-06-21 | Stop reason: HOSPADM

## 2024-06-19 RX ORDER — ONDANSETRON 2 MG/ML
4 INJECTION INTRAMUSCULAR; INTRAVENOUS ONCE
Status: COMPLETED | OUTPATIENT
Start: 2024-06-19 | End: 2024-06-19

## 2024-06-19 RX ORDER — SODIUM CHLORIDE 0.9 % (FLUSH) 0.9 %
5-40 SYRINGE (ML) INJECTION EVERY 12 HOURS SCHEDULED
Status: DISCONTINUED | OUTPATIENT
Start: 2024-06-19 | End: 2024-06-21 | Stop reason: HOSPADM

## 2024-06-19 RX ORDER — ENOXAPARIN SODIUM 100 MG/ML
1 INJECTION SUBCUTANEOUS 2 TIMES DAILY
Status: DISCONTINUED | OUTPATIENT
Start: 2024-06-20 | End: 2024-06-21

## 2024-06-19 RX ORDER — LANOLIN ALCOHOL/MO/W.PET/CERES
400 CREAM (GRAM) TOPICAL DAILY
Status: DISCONTINUED | OUTPATIENT
Start: 2024-06-20 | End: 2024-06-19 | Stop reason: CLARIF

## 2024-06-19 RX ORDER — ACETAMINOPHEN 650 MG/1
650 SUPPOSITORY RECTAL EVERY 6 HOURS PRN
Status: DISCONTINUED | OUTPATIENT
Start: 2024-06-19 | End: 2024-06-21 | Stop reason: HOSPADM

## 2024-06-19 RX ORDER — MAGNESIUM SULFATE IN WATER 40 MG/ML
2000 INJECTION, SOLUTION INTRAVENOUS ONCE
Status: DISCONTINUED | OUTPATIENT
Start: 2024-06-19 | End: 2024-06-21 | Stop reason: HOSPADM

## 2024-06-19 RX ORDER — ALBUTEROL SULFATE 2.5 MG/3ML
2.5 SOLUTION RESPIRATORY (INHALATION) PRN
Status: DISCONTINUED | OUTPATIENT
Start: 2024-06-19 | End: 2024-06-21 | Stop reason: HOSPADM

## 2024-06-19 RX ORDER — AMLODIPINE BESYLATE 10 MG/1
10 TABLET ORAL DAILY
Status: DISCONTINUED | OUTPATIENT
Start: 2024-06-20 | End: 2024-06-21 | Stop reason: HOSPADM

## 2024-06-19 RX ORDER — POTASSIUM CHLORIDE 20 MEQ/1
40 TABLET, EXTENDED RELEASE ORAL PRN
Status: DISCONTINUED | OUTPATIENT
Start: 2024-06-19 | End: 2024-06-21 | Stop reason: HOSPADM

## 2024-06-19 RX ORDER — POTASSIUM CHLORIDE 7.45 MG/ML
10 INJECTION INTRAVENOUS PRN
Status: DISCONTINUED | OUTPATIENT
Start: 2024-06-19 | End: 2024-06-21 | Stop reason: HOSPADM

## 2024-06-19 RX ORDER — LORAZEPAM 2 MG/ML
1 INJECTION INTRAMUSCULAR
Status: DISCONTINUED | OUTPATIENT
Start: 2024-06-19 | End: 2024-06-21 | Stop reason: HOSPADM

## 2024-06-19 RX ORDER — FOLIC ACID 1 MG/1
500 TABLET ORAL DAILY
Status: DISCONTINUED | OUTPATIENT
Start: 2024-06-19 | End: 2024-06-21 | Stop reason: HOSPADM

## 2024-06-19 RX ORDER — LORAZEPAM 1 MG/1
3 TABLET ORAL
Status: DISCONTINUED | OUTPATIENT
Start: 2024-06-19 | End: 2024-06-21 | Stop reason: HOSPADM

## 2024-06-19 RX ORDER — CETIRIZINE HYDROCHLORIDE 10 MG/1
10 TABLET ORAL DAILY
Status: DISCONTINUED | OUTPATIENT
Start: 2024-06-20 | End: 2024-06-21 | Stop reason: HOSPADM

## 2024-06-19 RX ADMIN — Medication 10 ML: at 22:06

## 2024-06-19 RX ADMIN — Medication 100 MG: at 18:54

## 2024-06-19 RX ADMIN — IOPAMIDOL 75 ML: 755 INJECTION, SOLUTION INTRAVENOUS at 15:04

## 2024-06-19 RX ADMIN — SODIUM CHLORIDE, PRESERVATIVE FREE 10 ML: 5 INJECTION INTRAVENOUS at 22:06

## 2024-06-19 RX ADMIN — ENOXAPARIN SODIUM 80 MG: 100 INJECTION SUBCUTANEOUS at 16:24

## 2024-06-19 RX ADMIN — ALPRAZOLAM 0.5 MG: 0.5 TABLET ORAL at 22:06

## 2024-06-19 RX ADMIN — ONDANSETRON 4 MG: 2 INJECTION INTRAMUSCULAR; INTRAVENOUS at 11:43

## 2024-06-19 RX ADMIN — FENTANYL CITRATE 50 MCG: 0.05 INJECTION, SOLUTION INTRAMUSCULAR; INTRAVENOUS at 11:43

## 2024-06-19 RX ADMIN — ALBUTEROL SULFATE 2.5 MG: 2.5 SOLUTION RESPIRATORY (INHALATION) at 22:37

## 2024-06-19 RX ADMIN — SODIUM CHLORIDE 1000 ML: 9 INJECTION, SOLUTION INTRAVENOUS at 11:44

## 2024-06-19 RX ADMIN — Medication 1 MG: at 12:35

## 2024-06-19 RX ADMIN — NITROGLYCERIN 0.4 MG: 0.4 TABLET, ORALLY DISINTEGRATING SUBLINGUAL at 13:01

## 2024-06-19 RX ADMIN — NITROGLYCERIN 0.4 MG: 0.4 TABLET, ORALLY DISINTEGRATING SUBLINGUAL at 13:11

## 2024-06-19 ASSESSMENT — ENCOUNTER SYMPTOMS
CHEST TIGHTNESS: 1
SHORTNESS OF BREATH: 1

## 2024-06-19 ASSESSMENT — PAIN DESCRIPTION - DESCRIPTORS
DESCRIPTORS: PRESSURE
DESCRIPTORS: STABBING

## 2024-06-19 ASSESSMENT — PAIN DESCRIPTION - LOCATION
LOCATION: ABDOMEN
LOCATION: CHEST
LOCATION: CHEST

## 2024-06-19 ASSESSMENT — PAIN DESCRIPTION - ORIENTATION
ORIENTATION: MID

## 2024-06-19 ASSESSMENT — PAIN SCALES - GENERAL
PAINLEVEL_OUTOF10: 8
PAINLEVEL_OUTOF10: 7
PAINLEVEL_OUTOF10: 5
PAINLEVEL_OUTOF10: 10
PAINLEVEL_OUTOF10: 8

## 2024-06-19 ASSESSMENT — PAIN DESCRIPTION - PAIN TYPE
TYPE: ACUTE PAIN
TYPE: ACUTE PAIN

## 2024-06-19 ASSESSMENT — LIFESTYLE VARIABLES
HOW MANY STANDARD DRINKS CONTAINING ALCOHOL DO YOU HAVE ON A TYPICAL DAY: 1 OR 2
HOW OFTEN DO YOU HAVE A DRINK CONTAINING ALCOHOL: MONTHLY OR LESS

## 2024-06-19 NOTE — PROGRESS NOTES
06/19/24 1900   RT Protocol   History Pulmonary Disease 2   Respiratory pattern 0   Breath sounds 0   Cough 0   Indications for Bronchodilator Therapy None   Bronchodilator Assessment Score 2

## 2024-06-19 NOTE — ED NOTES
Patient renetta been given 2nd nitro okay per provider due to troponin results climbing, /90, . 2nd Nitro dropped her BP, 2nd Liter of fluids started, per verbal order from Rahel NP,  and patient placed in trendelenburg. Pressure came up, patient was feeling better.

## 2024-06-19 NOTE — ED NOTES
Pt states improvement of breathing at this time   Pt is calm at this time   Pt remains alert and oriented times 4

## 2024-06-19 NOTE — H&P
DEPARTMENT OF HOSPITAL MEDICINE    HISTORY AND PHYSICAL EXAM    PATIENT NAME:  Gabriel Resendiz    MRN:  [id number]  SERVICE DATE:  2024   SERVICE TIME:  4:50 PM    Primary Care Physician: Deon Kerr MD     SUBJECTIVE  CHIEF COMPLAINT:  Shortness of Breath       Shortness of Breath          Gabriel Resendiz is a 58 y.o. female with PMH severe persistent asthma, GERD, HTN, HLD  who  presents to the emergency department with chief complaint of Shortness of Breath    Patient says symptoms started 3 days ago after her AC broke. She at first thought she was having asthma attacks and was taking PRN inhalers which did not help. Earlier today she noted chest pressure and continued dyspnea and presented to ED. CTA in ED noting RLL filling defect concerning for PE with associated R  heart strain. Patient denied any personal or family history of clotting disorders. She works 5d/week on her feet, denying a sedentary lifestyle. She denies any recent leg swelling. At this time she still notes significant chest pressure but denies any dizziness or lightheadedness. She says she drinks 4-5 margaritas most days over the last several weeks after hearing \"bad personal news\". Prior to that she denies any significant EtOH history or history of EtOH withdrawals       There were no encounter diagnoses.      PAST MEDICAL HISTORY:    Past Medical History:   Diagnosis Date    Allergic rhinitis     Anxiety disorder     Cleft lip     Divergent strabismus     Diverticulitis 2013    s/p partial colectomy, Dr Ortiz    Hyperlipidemia LDL goal <100     Hypertension     Obesity (BMI 30-39.9)     Severe persistent asthma without complication     intubated x 2013    Type 2 diabetes mellitus (HCC) 2020    A1C 6.8      PAST SURGICAL HISTORY:    Past Surgical History:   Procedure Laterality Date     SECTION      x 3    CLEFT LIP REPAIR      COLONOSCOPY      COLONOSCOPY N/A 2023    COLONOSCOPY DIAGNOSTIC  performed by Maxi Cervantes MD at [facility]    OTHER SURGICAL HISTORY  12/28/2013    Exploratory laparotomy with sigmoid colectomy, drainage pelvic abscess and formation end descending colostomy    OTHER SURGICAL HISTORY  03/20/2014    Exploratory lapartomy with extensived lysis of adhesions, takedown of colostmy with primary stapled anastomosis, rigid sigmoidoscopy    UPPER GASTROINTESTINAL ENDOSCOPY N/A 5/5/2023    EGD DIAGNOSTIC ONLY performed by Maxi Cervantes MD at [facility]     FAMILY HISTORY:    Family History   Problem Relation Age of Onset    Heart Failure Mother         dec age    Diabetes Mother     Cancer Sister         pancreas, dec age 44    Colon Cancer Neg Hx      SOCIAL HISTORY:    Social History     Socioeconomic History    Marital status:      Spouse name: Not on file    Number of children: 1    Years of education: Not on file    Highest education level: Not on file   Occupational History    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[employer]    Tobacco Use    Smoking status: Never    Smokeless tobacco: Never   Vaping Use    Vaping Use: Never used   Substance and Sexual Activity    Alcohol use: Yes     Comment: social    Drug use: Yes     Types: Marijuana (Weed)     Comment: occassional    Sexual activity: Not on file   Other Topics Concern    Not on file   Social History Narrative    Born in [place], one of 3     Dec 30, 2015 to a [place]    3 children on [place]    Works in nursing homes    Lives 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[place] 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 and grandson    Hobbies cooking, family, bowling     Social Determinants of Health     Financial Resource Strain: Low Risk  (2/10/2023)    Overall Financial Resource Strain (CARDIA)     Difficulty of Paying Living Expenses: Not hard at all   Food Insecurity: Not on file (2/10/2023)   Transportation Needs: Unknown (2/10/2023)    PRAPARE - Transportation     Lack of Transportation (Medical): Not on file     Lack of Transportation (Non-Medical):  (NEBULIZER/TUBING/MOUTHPIECE) KIT 1 kit by Does not apply route daily as needed (use as directed) 2/10/21   Toshia Galvan MD   Spacer/Aero-Holding Chambers (E-Z SPACER) GASTON Us as directed for asthma 2/10/21   Tsohia Galvan MD       ALLERGIES: Bacitracin, Butalbital-aspirin-caffeine, Codeine, Floxin [ofloxacin], Rocephin [ceftriaxone sodium], Tomato, Asa [aspirin], and Lisinopril    REVIEW OF SYSTEM:   Review of Systems   Respiratory:  Positive for shortness of breath.    All other systems reviewed and are negative.        OBJECTIVE  PHYSICAL EXAM: BP (!) 135/91   Pulse 93   Temp 98.3 °F (36.8 °C) (Oral)   Resp 18   Ht 1.651 m (5' 5\")   Wt 79.8 kg (176 lb)   LMP 10/07/2017   SpO2 95%   BMI 29.29 kg/m²   Physical Exam  Constitutional:       Appearance: Normal appearance.   HENT:      Right Ear: External ear normal.      Left Ear: External ear normal.   Eyes:      General: No scleral icterus.     Conjunctiva/sclera: Conjunctivae normal.   Cardiovascular:      Rate and Rhythm: Normal rate and regular rhythm.   Pulmonary:      Effort: Pulmonary effort is normal.      Breath sounds: Normal breath sounds. No wheezing.   Abdominal:      General: Abdomen is flat.      Palpations: Abdomen is soft.   Musculoskeletal:      Right lower leg: No edema.      Left lower leg: No edema.   Skin:     General: Skin is warm and dry.   Neurological:      General: No focal deficit present.      Mental Status: She is alert and oriented to person, place, and time.   Psychiatric:         Mood and Affect: Mood normal.         Thought Content: Thought content normal.         Judgment: Judgment normal.       Neurologic Exam     Mental Status   Oriented to person, place, and time.        DATA:     Diagnostic tests reviewed for today's visit:    Most recent labs and imaging results reviewed.     LABS:    Abnormal Labs Reviewed   CBC WITH AUTO DIFFERENTIAL - Abnormal; Notable for the following components:       Result Value

## 2024-06-19 NOTE — ED PROVIDER NOTES
[facility] TELEMET[facility]  EMERGENCY DEPARTMENT ENCOUNTER      Pt Name: Gabriel Resendiz  MRN: [id number]  Birthdate 1965  Date of evaluation: 6/19/2024  Provider: LEONEL Conway  10:53 AM EDT    CHIEF COMPLAINT       Chief Complaint   Patient presents with    Shortness of Breath         HISTORY OF PRESENT ILLNESS   (Location/Symptom, Timing/Onset, Context/Setting, Quality, Duration, Modifying Factors, Severity)  Note limiting factors.   Gabriel Resendiz is a 58 y.o. female whom per chart has a PMHx of anxiety, asthma, hypertension, diverticulitis, obesity, hyperlipidemia, type II DM, iron deficiency anemia presents to ED via EMS for evaluation of shortness of breath.  Patient reports that the air conditioning her home has been broken for the last 4 days and states that 2 days ago she began experiencing shortness of breath.  Patient does have a history of asthma.  Reports that she has been utilizing her inhaler, nebulizer and reports minimal improvement in symptoms.  Patient does state that she used her nebulizer 5 times prior to calling EMS today and was given an additional albuterol neb en route to ED, in addition to 125 mg of Solu-Medrol.  Patient does report associated chest tightness, which is nonexertional.  Denies ill contacts, exposures.  Denies recent illness.  No additional complaints verbalized.  Patient denies N/V/D, fever, chills, myalgias.    HPI    Nursing Notes were reviewed.    REVIEW OF SYSTEMS    (2-9 systems for level 4, 10 or more for level 5)     Review of Systems   Respiratory:  Positive for chest tightness and shortness of breath.    All other systems reviewed and are negative.      Except as noted above the remainder of the review of systems was reviewed and negative.       PAST MEDICAL HISTORY     Past Medical History:   Diagnosis Date    Allergic rhinitis     Anxiety disorder     Cleft lip     Divergent strabismus     Diverticulitis 11/2013    s/p partial colectomy, Dr Ortiz       Vitals:    06/19/24 1445 06/19/24 1610 06/19/24 1640 06/19/24 1747   BP: 124/79 (!) 128/92 (!) 135/91 (!) 141/93   Pulse: 94 (!) 102 93 92   Resp: 22 22 18 18   Temp:    97.1 °F (36.2 °C)   TempSrc:    Oral   SpO2: 98% 96% 95% 99%   Weight:       Height:           Medical Decision Making  58-year-old female presents to ED via EMS for evaluation of shortness of breath.  Patient is afebrile, hemodynamically stable, nontoxic.  Patient given 1 L IV NS, IV magnesium, IV fentanyl, IV Zofran while in ED.  EKG shows NSR with HR 95, normal axis, normal intervals, no ST changes.  In comparison EKG performed on 11/01/2022, no significant change noted.  No STEMI.  Labs remarkable for troponin 60.  Repeat troponin 103.  Patient given sublingual nitro x 2 doses while in ED and patient does report significant improvement in chest \"heaviness\"/tightness.  Chest x-ray per radiologist interpretation demonstrates no acute cardiopulmonary pathology.  Given elevated troponins, CTA chest added to initial workup to rule out PE with heart strain.  CTA chest per radiologist interpretation demonstrates areas of homogenous filling of the right lower lobe and mild straightening of the interventricular septum for right heart strain.  Moderate size hiatal hernia again seen.  Patient reassessed; updated on results, findings, plan.  Patient is agreeable to admission.  Patient is not requiring O2 throughout ED course and is noted to be 95% to 99% on RA.  Lungs CTA throughout on reauscultation.  Patient does report improvement in symptoms.  Plan to admit patient to medicine with interventional cardiology on consultation.  Call placed to medicine.  Spoke with Dr. Silva who is agreeable to admission; is requesting subcu Lovenox.  Patient given Lovenox while in ED.  Call placed to interventional cardiology.  Spoke with Dr. Castorena who is agreeable to consultation.  No plan for thrombectomy at this time.    Problems Addressed:  Acute pulmonary

## 2024-06-19 NOTE — ED NOTES
Oxygen removed at this time and is 100% SPO2 on RA  Pt states \"I feel ok.  I just feel like I am panicking\"  Updated Rahel DELA CRUZ at this time

## 2024-06-19 NOTE — ED TRIAGE NOTES
Pt arrived for SOB   Pt states that air conditioner broke a couple days ago  Pt is having SOB and chest pain and states due to SOB  Pt is alert and oriented times 4

## 2024-06-19 NOTE — PLAN OF CARE
Pt arrived frm home via cot, w/ daughter at her bedside. She is A/O/ x 4, 1 assist. She takes her po medications whole w/ water. Last BM on 06/19/2024, no c/o constipation, or diarrhea. Lung sounds dim Bilat., POX on RA at 100%, no c/o SOB or difficulties breathing. Pt has Bilat, 20 ga IV in UE that is clean, dry, and intact. Bilat UE pulses are at +2, Bilat. LE pulses at +2. Call light is w/in reach.

## 2024-06-20 ENCOUNTER — APPOINTMENT (OUTPATIENT)
Age: 59
End: 2024-06-20
Payer: COMMERCIAL

## 2024-06-20 LAB
ANION GAP SERPL CALCULATED.3IONS-SCNC: 9 MEQ/L (ref 9–15)
BASOPHILS # BLD: 0 K/UL (ref 0–0.2)
BASOPHILS NFR BLD: 0.1 %
BILIRUB UR QL STRIP: NEGATIVE
BUN SERPL-MCNC: 11 MG/DL (ref 6–20)
CALCIUM SERPL-MCNC: 9.1 MG/DL (ref 8.5–9.9)
CHLORIDE SERPL-SCNC: 103 MEQ/L (ref 95–107)
CLARITY UR: CLEAR
CO2 SERPL-SCNC: 24 MEQ/L (ref 20–31)
COLOR UR: YELLOW
CREAT SERPL-MCNC: 0.65 MG/DL (ref 0.5–0.9)
D DIMER PPP FEU-MCNC: 0.28 MG/L FEU (ref 0–0.5)
ECHO AO ROOT DIAM: 3.2 CM
ECHO AO ROOT INDEX: 1.71 CM/M2
ECHO AV AREA PEAK VELOCITY: 2.6 CM2
ECHO AV AREA VTI: 3.2 CM2
ECHO AV AREA/BSA PEAK VELOCITY: 1.4 CM2/M2
ECHO AV AREA/BSA VTI: 1.7 CM2/M2
ECHO AV CUSP MM: 2.2 CM
ECHO AV MEAN GRADIENT: 5 MMHG
ECHO AV MEAN VELOCITY: 1.1 M/S
ECHO AV PEAK GRADIENT: 10 MMHG
ECHO AV PEAK VELOCITY: 1.7 M/S
ECHO AV VELOCITY RATIO: 0.65
ECHO AV VTI: 29.4 CM
ECHO BSA: 1.91 M2
ECHO LA DIAMETER INDEX: 1.39 CM/M2
ECHO LA DIAMETER: 2.6 CM
ECHO LA TO AORTIC ROOT RATIO: 0.81
ECHO LA VOL A-L A2C: 74 ML (ref 22–52)
ECHO LA VOL A-L A4C: 57 ML (ref 22–52)
ECHO LA VOL MOD A2C: 71 ML (ref 22–52)
ECHO LA VOL MOD A4C: 55 ML (ref 22–52)
ECHO LA VOLUME AREA LENGTH: 65 ML
ECHO LA VOLUME INDEX A-L A2C: 40 ML/M2 (ref 16–34)
ECHO LA VOLUME INDEX A-L A4C: 30 ML/M2 (ref 16–34)
ECHO LA VOLUME INDEX AREA LENGTH: 35 ML/M2 (ref 16–34)
ECHO LA VOLUME INDEX MOD A2C: 38 ML/M2 (ref 16–34)
ECHO LA VOLUME INDEX MOD A4C: 29 ML/M2 (ref 16–34)
ECHO LV E' LATERAL VELOCITY: 8 CM/S
ECHO LV E' SEPTAL VELOCITY: 8 CM/S
ECHO LV EDV A2C: 93 ML
ECHO LV EDV A4C: 93 ML
ECHO LV EDV BP: 93 ML (ref 56–104)
ECHO LV EDV INDEX A4C: 50 ML/M2
ECHO LV EDV INDEX BP: 50 ML/M2
ECHO LV EDV NDEX A2C: 50 ML/M2
ECHO LV EJECTION FRACTION A2C: 64 %
ECHO LV EJECTION FRACTION A4C: 60 %
ECHO LV EJECTION FRACTION BIPLANE: 62 % (ref 55–100)
ECHO LV ESV A2C: 34 ML
ECHO LV ESV A4C: 37 ML
ECHO LV ESV BP: 36 ML (ref 19–49)
ECHO LV ESV INDEX A2C: 18 ML/M2
ECHO LV ESV INDEX A4C: 20 ML/M2
ECHO LV ESV INDEX BP: 19 ML/M2
ECHO LV FRACTIONAL SHORTENING: 51 % (ref 28–44)
ECHO LV INTERNAL DIMENSION DIASTOLE INDEX: 2.19 CM/M2
ECHO LV INTERNAL DIMENSION DIASTOLIC: 4.1 CM (ref 3.9–5.3)
ECHO LV INTERNAL DIMENSION SYSTOLIC INDEX: 1.07 CM/M2
ECHO LV INTERNAL DIMENSION SYSTOLIC: 2 CM
ECHO LV IVSD: 1.2 CM (ref 0.6–0.9)
ECHO LV IVSS: 1.5 CM
ECHO LV MASS 2D: 193.5 G (ref 67–162)
ECHO LV MASS INDEX 2D: 103.5 G/M2 (ref 43–95)
ECHO LV POSTERIOR WALL DIASTOLIC: 1.4 CM (ref 0.6–0.9)
ECHO LV POSTERIOR WALL SYSTOLIC: 2.5 CM
ECHO LV RELATIVE WALL THICKNESS RATIO: 0.68
ECHO LVOT AREA: 3.8 CM2
ECHO LVOT AV VTI INDEX: 0.82
ECHO LVOT DIAM: 2.2 CM
ECHO LVOT MEAN GRADIENT: 3 MMHG
ECHO LVOT PEAK GRADIENT: 5 MMHG
ECHO LVOT PEAK VELOCITY: 1.1 M/S
ECHO LVOT STROKE VOLUME INDEX: 49 ML/M2
ECHO LVOT SV: 91.6 ML
ECHO LVOT VTI: 24.1 CM
ECHO MV A VELOCITY: 1.05 M/S
ECHO MV E VELOCITY: 0.8 M/S
ECHO MV E/A RATIO: 0.76
ECHO MV E/E' LATERAL: 10
ECHO MV E/E' RATIO (AVERAGED): 10
ECHO MV E/E' SEPTAL: 10
ECHO PV MAX VELOCITY: 0.9 M/S
ECHO PV PEAK GRADIENT: 3 MMHG
ECHO RV INTERNAL DIMENSION: 2.5 CM
ECHO RV TAPSE: 2.9 CM (ref 1.7–?)
EKG ATRIAL RATE: 95 BPM
EKG P AXIS: 58 DEGREES
EKG P-R INTERVAL: 164 MS
EKG Q-T INTERVAL: 380 MS
EKG QRS DURATION: 84 MS
EKG QTC CALCULATION (BAZETT): 477 MS
EKG R AXIS: 9 DEGREES
EKG T AXIS: 40 DEGREES
EKG VENTRICULAR RATE: 95 BPM
EOSINOPHIL # BLD: 0 K/UL (ref 0–0.7)
EOSINOPHIL NFR BLD: 0 %
ERYTHROCYTE [DISTWIDTH] IN BLOOD BY AUTOMATED COUNT: 13.8 % (ref 11.5–14.5)
GLUCOSE SERPL-MCNC: 118 MG/DL (ref 70–99)
GLUCOSE UR STRIP-MCNC: NEGATIVE MG/DL
HCT VFR BLD AUTO: 39.3 % (ref 37–47)
HGB BLD-MCNC: 13.8 G/DL (ref 12–16)
HGB UR QL STRIP: NEGATIVE
KETONES UR STRIP-MCNC: NEGATIVE MG/DL
LEUKOCYTE ESTERASE UR QL STRIP: NEGATIVE
LYMPHOCYTES # BLD: 0.5 K/UL (ref 1–4.8)
LYMPHOCYTES NFR BLD: 5.9 %
MCH RBC QN AUTO: 35.3 PG (ref 27–31.3)
MCHC RBC AUTO-ENTMCNC: 35.1 % (ref 33–37)
MCV RBC AUTO: 100.5 FL (ref 79.4–94.8)
MONOCYTES # BLD: 0.3 K/UL (ref 0.2–0.8)
MONOCYTES NFR BLD: 3.6 %
NEUTROPHILS # BLD: 8.1 K/UL (ref 1.4–6.5)
NEUTS SEG NFR BLD: 90.1 %
NITRITE UR QL STRIP: NEGATIVE
PH UR STRIP: 7 [PH] (ref 5–9)
PLATELET # BLD AUTO: 352 K/UL (ref 130–400)
POTASSIUM SERPL-SCNC: 4.4 MEQ/L (ref 3.4–4.9)
PROT UR STRIP-MCNC: NEGATIVE MG/DL
RBC # BLD AUTO: 3.91 M/UL (ref 4.2–5.4)
REASON FOR REJECTION: NORMAL
REJECTED TEST: NORMAL
SODIUM SERPL-SCNC: 136 MEQ/L (ref 135–144)
SP GR UR STRIP: 1.01 (ref 1–1.03)
TROPONIN, HIGH SENSITIVITY: 104 NG/L (ref 0–19)
TROPONIN, HIGH SENSITIVITY: 119 NG/L (ref 0–19)
TROPONIN, HIGH SENSITIVITY: 126 NG/L (ref 0–19)
URINE REFLEX TO CULTURE: NORMAL
UROBILINOGEN UR STRIP-ACNC: 0.2 E.U./DL
WBC # BLD AUTO: 9 K/UL (ref 4.8–10.8)

## 2024-06-20 PROCEDURE — 2700000000 HC OXYGEN THERAPY PER DAY

## 2024-06-20 PROCEDURE — 2580000003 HC RX 258: Performed by: STUDENT IN AN ORGANIZED HEALTH CARE EDUCATION/TRAINING PROGRAM

## 2024-06-20 PROCEDURE — 81003 URINALYSIS AUTO W/O SCOPE: CPT

## 2024-06-20 PROCEDURE — 94761 N-INVAS EAR/PLS OXIMETRY MLT: CPT

## 2024-06-20 PROCEDURE — 85025 COMPLETE CBC W/AUTO DIFF WBC: CPT

## 2024-06-20 PROCEDURE — 6360000002 HC RX W HCPCS: Performed by: STUDENT IN AN ORGANIZED HEALTH CARE EDUCATION/TRAINING PROGRAM

## 2024-06-20 PROCEDURE — 36415 COLL VENOUS BLD VENIPUNCTURE: CPT

## 2024-06-20 PROCEDURE — 99223 1ST HOSP IP/OBS HIGH 75: CPT | Performed by: INTERNAL MEDICINE

## 2024-06-20 PROCEDURE — 93306 TTE W/DOPPLER COMPLETE: CPT | Performed by: INTERNAL MEDICINE

## 2024-06-20 PROCEDURE — 93306 TTE W/DOPPLER COMPLETE: CPT

## 2024-06-20 PROCEDURE — 84484 ASSAY OF TROPONIN QUANT: CPT

## 2024-06-20 PROCEDURE — 80048 BASIC METABOLIC PNL TOTAL CA: CPT

## 2024-06-20 PROCEDURE — APPSS45 APP SPLIT SHARED TIME 31-45 MINUTES

## 2024-06-20 PROCEDURE — 2060000000 HC ICU INTERMEDIATE R&B

## 2024-06-20 PROCEDURE — 85379 FIBRIN DEGRADATION QUANT: CPT

## 2024-06-20 PROCEDURE — 6370000000 HC RX 637 (ALT 250 FOR IP): Performed by: STUDENT IN AN ORGANIZED HEALTH CARE EDUCATION/TRAINING PROGRAM

## 2024-06-20 PROCEDURE — 94640 AIRWAY INHALATION TREATMENT: CPT

## 2024-06-20 RX ADMIN — FOLIC ACID 500 MCG: 1 TABLET ORAL at 09:28

## 2024-06-20 RX ADMIN — BUDESONIDE AND FORMOTEROL FUMARATE DIHYDRATE 2 PUFF: 160; 4.5 AEROSOL RESPIRATORY (INHALATION) at 19:15

## 2024-06-20 RX ADMIN — AMLODIPINE BESYLATE 10 MG: 10 TABLET ORAL at 09:28

## 2024-06-20 RX ADMIN — PANTOPRAZOLE SODIUM 40 MG: 40 TABLET, DELAYED RELEASE ORAL at 09:28

## 2024-06-20 RX ADMIN — SODIUM CHLORIDE, PRESERVATIVE FREE 10 ML: 5 INJECTION INTRAVENOUS at 21:21

## 2024-06-20 RX ADMIN — ALBUTEROL SULFATE 2.5 MG: 2.5 SOLUTION RESPIRATORY (INHALATION) at 06:00

## 2024-06-20 RX ADMIN — ALPRAZOLAM 0.5 MG: 0.5 TABLET ORAL at 21:21

## 2024-06-20 RX ADMIN — CETIRIZINE HYDROCHLORIDE 10 MG: 10 TABLET, FILM COATED ORAL at 09:34

## 2024-06-20 RX ADMIN — Medication 10 ML: at 09:38

## 2024-06-20 RX ADMIN — Medication 100 MG: at 09:28

## 2024-06-20 RX ADMIN — Medication 10 ML: at 21:25

## 2024-06-20 RX ADMIN — TIOTROPIUM BROMIDE INHALATION SPRAY 2 PUFF: 3.12 SPRAY, METERED RESPIRATORY (INHALATION) at 07:54

## 2024-06-20 RX ADMIN — BUDESONIDE AND FORMOTEROL FUMARATE DIHYDRATE 2 PUFF: 160; 4.5 AEROSOL RESPIRATORY (INHALATION) at 07:54

## 2024-06-20 RX ADMIN — ENOXAPARIN SODIUM 80 MG: 100 INJECTION SUBCUTANEOUS at 09:30

## 2024-06-20 RX ADMIN — ACETAMINOPHEN 325MG 650 MG: 325 TABLET ORAL at 09:27

## 2024-06-20 RX ADMIN — PRAVASTATIN SODIUM 40 MG: 40 TABLET ORAL at 09:29

## 2024-06-20 RX ADMIN — ENOXAPARIN SODIUM 80 MG: 100 INJECTION SUBCUTANEOUS at 21:20

## 2024-06-20 RX ADMIN — FLUTICASONE PROPIONATE 1 SPRAY: 50 SPRAY, METERED NASAL at 09:34

## 2024-06-20 RX ADMIN — SODIUM CHLORIDE, PRESERVATIVE FREE 10 ML: 5 INJECTION INTRAVENOUS at 09:37

## 2024-06-20 ASSESSMENT — ENCOUNTER SYMPTOMS
SHORTNESS OF BREATH: 1
CHEST TIGHTNESS: 1

## 2024-06-20 ASSESSMENT — PAIN SCALES - GENERAL: PAINLEVEL_OUTOF10: 7

## 2024-06-20 ASSESSMENT — PAIN DESCRIPTION - LOCATION: LOCATION: CHEST

## 2024-06-20 NOTE — ACP (ADVANCE CARE PLANNING)
Advance Care Planning   Healthcare Decision Maker:    Primary Decision Maker: Diane Tan - Child - [phone]    Primary Decision Maker: Katrin Tan - Child - [phone]    Click here to complete Healthcare Decision Makers including selection of the Healthcare Decision Maker Relationship (ie \"Primary\").  Today we documented Decision Maker(s) consistent with Legal Next of Kin hierarchy.       Electronically signed by MILDRED Hernandez on 6/20/2024 at 11:27 AM

## 2024-06-20 NOTE — PROGRESS NOTES
Uneventful shift. Dual skin completed. PRN breathing txs administered overnight via RT. Vitals and assessments stable. No needs at this time    .Electronically signed by Lee Ann Doll RN on 6/20/2024 at 6:51 AM

## 2024-06-20 NOTE — CONSULTS
abdomen shows a moderate size hiatal hernia.. Soft Tissues/Bones: No acute bone or soft tissue abnormality.  Degenerative changes are seen at multiple levels in the spine.     There are areas of in homogeneous filling in the right lower lobe and there is mild straightening of the interventricular septum for heart strain. Moderate-sized hiatal hernia is again seen.  Findings discussed with RONY Leblanc on today's date at 3:45 p.m.     XR CHEST PORTABLE    Result Date: 6/19/2024  EXAMINATION: ONE XRAY VIEW OF THE CHEST 6/19/2024 11:10 am COMPARISON: January 9, 2023 1021 hours HISTORY: ORDERING SYSTEM PROVIDED HISTORY: SOB TECHNOLOGIST PROVIDED HISTORY: Reason for exam:->SOB What reading provider will be dictating this exam?->CRC FINDINGS: Single view of the chest is submitted. The cardiac silhouette is enlarged Pulmonary vasculature is unremarkable. Right-sided trachea. No focal infiltrate No effusion No pneumothoraces.     1. No acute cardiopulmonary process.       EKG: normal sinus rhythm      2D Echo:     No results found for this or any previous visit.      Stress Test:     No results found for this or any previous visit.  No results found for this or any previous visit from the past 365 days.       No results found for this or any previous visit from the past 365 days.          ASSESSMENT:    CTA SHOWING ACUTE PULMONARY EMBOLISM PER RADIOLOGIST REPORT WITH CONCERNS OF RV STRAIN?-- UNPROVOKED   Elevated troponin (60, 103, 117, 126, 119)  HTN  HLD  Severe persistent asthma       PLAN:   As always, aggressive risk factor modification is strongly recommended. We should adhere to the JNC VIII guidelines for HTN management and the NCEPATP III guidelines for LDL-C management.  Continue to monitor on telemetry  Check 2D echocardiogram  Check D dimer   Maximize cardiac medications  CONTINUE FULL DOSE ANTICOAGULATION. TRANSITION TO DOAC PRIOR TO DISCHARGE.  Maintain potassium greater than 4 and magnesium greater than  2  GI/DVT prophylaxis  DR. LUCIA TO REVIEW FILMS . NO PLAN FOR THROMBECTOMY AT THIS TIME  FURTHER RECOMMENDATIONS TO FOLLOW        Thank you for allowing me to participate in the care of your patient, please don't hesitate to contact me if you have any further questions.      Electronically signed by ROOPA Engel CNP on 6/20/2024 at 9:17 AM        Attending Supervising Physician's Attestation Statement  The patient is a 58 y.o. female. I have performed a history and physical examination of the patient. I discussed the case with the nurse practitioner.    I reviewed the patient's Past Medical History, Past Surgical History, Medications, and Allergies.     Physical Exam:  Vitals:    06/20/24 0407 06/20/24 0601 06/20/24 0755 06/20/24 0800   BP: 135/84      Pulse: 78   (!) 124   Resp: 19      Temp: 97.5 °F (36.4 °C)      TempSrc: Oral      SpO2: 99% 97% 99%    Weight:       Height:           Review of Systems - Respiratory ROS: positive for - shortness of breath  Cardiovascular ROS: no chest pain or dyspnea on exertion  Gastrointestinal ROS: no abdominal pain, change in bowel habits, or black or bloody stools    Pulmonary/Chest: clear to auscultation bilaterally- no wheezes, rales or rhonchi, normal air movement, no respiratory distress  Cardiovascular: normal rate, normal S1 and S2, no gallops, intact distal pulses, and no carotid bruits  Abdomen: soft, non-tender, non-distended, normal bowel sounds, no masses or organomegaly    Active Hospital Problems    Diagnosis Date Noted    Acute pulmonary embolism with acute cor pulmonale, unspecified pulmonary embolism type (HCC) [I26.09] 06/19/2024     Priority: Low        I reviewed and agree with the findings and plan documented in her note .                  ASSESSMENT:     CTA SHOWING ACUTE PULMONARY EMBOLISM PER RADIOLOGIST REPORT WITH CONCERNS OF RV STRAIN?--ON MY REVIEW OF CTA OF CHEST I DO NOT SEE A CLEAR PE.   NEGATIVE  B/L LE VENOUS DUPLEX US  Elevated  troponin (60, 103, 117, 126, 119)- ? DEMAND ISCHEMIA VS OTHER.   HTN  HLD  Severe persistent asthma         PLAN:   As always, aggressive risk factor modification is strongly recommended. We should adhere to the JNC VIII guidelines for HTN management and the NCEPATP III guidelines for LDL-C management.  Continue to monitor on telemetry  NO INDICATION FOR PE THROMBECTOMY.   WILL CONSULT PULMONARY FOR OPINION AND REVIEW OF CTA OF CHEST.   Check 2D echocardiogram  Maximize cardiac medications  CONTINUE FULL DOSE ANTICOAGULATION FOR NOW UNTIL PULMONARY REVIEW.   Maintain potassium greater than 4 and magnesium greater than 2  GI/DVT prophylaxis  CORONARY EVALUATION IN NEAR FUTURE GIVEN SOB AND CHEST PRESSURE.     Electronically signed by Wero Castorena DO on 6/20/24 at 3:30 PM EDT

## 2024-06-20 NOTE — PROGRESS NOTES
Hospitalist Progress Note      PCP: Deon Kerr MD    Date of Admission: 6/19/2024    Chief Complaint:    Chief Complaint   Patient presents with    Shortness of Breath     Subjective:  No acute events overnight. Pt reports still some heaviness with her breathing but feels it has improved since arrival. Denies chest pain.    Medications:  Reviewed    Infusion Medications    sodium chloride      sodium chloride       Scheduled Medications    magnesium sulfate  2,000 mg IntraVENous Once    sodium chloride flush  5-40 mL IntraVENous 2 times per day    enoxaparin  1 mg/kg SubCUTAneous BID    sodium chloride flush  5-40 mL IntraVENous 2 times per day    thiamine  100 mg Oral Daily    amLODIPine  10 mg Oral Daily    escitalopram  10 mg Oral Daily    fluticasone  1 spray Nasal Daily    cetirizine  10 mg Oral Daily    pantoprazole  40 mg Oral Daily    pravastatin  40 mg Oral Daily    folic acid  500 mcg Oral Daily    budesonide-formoterol  2 puff Inhalation BID RT    tiotropium  2 puff Inhalation Daily RT     PRN Meds: albuterol, nitroGLYCERIN, sodium chloride flush, sodium chloride, potassium chloride **OR** potassium alternative oral replacement **OR** potassium chloride, magnesium sulfate, ondansetron **OR** ondansetron, polyethylene glycol, acetaminophen **OR** acetaminophen, sodium chloride flush, sodium chloride, LORazepam **OR** LORazepam **OR** LORazepam **OR** LORazepam **OR** LORazepam **OR** LORazepam **OR** LORazepam **OR** LORazepam, HYDROmorphone, albuterol, ALPRAZolam, ibuprofen      Intake/Output Summary (Last 24 hours) at 6/20/2024 1205  Last data filed at 6/19/2024 1849  Gross per 24 hour   Intake 1240 ml   Output --   Net 1240 ml     Exam:    /84   Pulse 78   Temp 97.5 °F (36.4 °C) (Oral)   Resp 19   Ht 1.651 m (5' 5\")   Wt 79.8 kg (176 lb)   LMP 10/07/2017   SpO2 99%   BMI 29.29 kg/m²   Physical Exam  Cardiovascular:      Rate and Rhythm: Normal rate and regular rhythm.   Pulmonary:

## 2024-06-20 NOTE — PROGRESS NOTES
Physician Progress Note      PATIENT:               AMY MUNGUIA  CSN #:                  [id number]  :                       1965  ADMIT DATE:       2024 10:48 AM  DISCH DATE:  RESPONDING  PROVIDER #:        Katrin Valadez MD          QUERY TEXT:    Pt admitted with shortness of breath and has pulmonary embolism with right   heart strain documented. If possible, please document in progress notes and   discharge summary if you are evaluating and/or treating any of the following:    The medical record reflects the following:  Risk Factors: pulmonary embolism, alcohol abuse, asthma  Clinical Indicators: CT chest There are areas of in homogeneous filling in the   right lower lobe and there is mild straightening of the interventricular   septum for heart strain, shortness of breath, HR 94, 98% on 2L  Treatment: CT chest, Lovenox, Solumedrol, Proventil, Symbicort, Flonase    Park CROOKS, RN, CDS  [phone]  Options provided:  -- Acute pulmonary embolism with acute cor pulmonale  -- Other - I will add my own diagnosis  -- Disagree - Not applicable / Not valid  -- Disagree - Clinically unable to determine / Unknown  -- Refer to Clinical Documentation Reviewer    PROVIDER RESPONSE TEXT:    This patient has acute pulmonary embolism with acute cor pulmonale.    Query created by: aPrk Hollins on 2024 9:54 AM      Electronically signed by:  Katrin Valadez MD 2024 3:46 PM

## 2024-06-20 NOTE — PROGRESS NOTES
[employer] Formul[employer] approved substitution for Folic acid 400mcg daily is Folic acid 500mcg daily    Yanelis Caldera [facility]  6/19/2024  8:02 PM

## 2024-06-20 NOTE — PROGRESS NOTES
[employer] Formul[employer] approved substitution for Breztri 2 puffs bid is  Symbicort 160/4.5mcg 2 puffs bid and Spiriva Respimat (2.5mcg) 2 inhalations daily    Yanelis Caldera [facility]  6/19/2024  8:39 PM       Admission

## 2024-06-20 NOTE — CARE COORDINATION
Case Management Assessment  Initial Evaluation    Date/Time of Evaluation: 6/20/2024 11:28 AM  Assessment Completed by: MILDRED Hernandez    If patient is discharged prior to next notation, then this note serves as note for discharge by case management.    Patient Name: Gabriel Resendiz                   YOB: 1965  Diagnosis: Acute pulmonary embolism with acute cor pulmonale, unspecified pulmonary embolism type (HCC) [I26.09]                   Date / Time: 6/19/2024 10:48 AM    Patient Admission Status: Inpatient   Readmission Risk (Low < 19, Mod (19-27), High > 27): Readmission Risk Score: 9.1    Current PCP: Deon Kerr MD  PCP verified by CM? Yes    Chart Reviewed: Yes      History Provided by: Patient  Patient Orientation: Alert and Oriented    Patient Cognition: Alert    Hospitalization in the last 30 days (Readmission):  No    If yes, Readmission Assessment in CM Navigator will be completed.    Advance Directives:      Code Status: Full Code   Patient's Primary Decision Maker is: Legal Next of Kin    Primary Decision Maker: Diane Tan - Child - [phone]    Primary Decision Maker: Katrin Tan - Child - [phone]    Discharge Planning:    Patient lives with: Family Members Type of Home: House  Primary Care Giver: Self  Patient Support Systems include: Children, Family Members   Current Financial resources: Other (Comment) (JIMMY OBNILLA)  Current community resources: None  Current services prior to admission: None            Current DME:              Type of Home Care services:  None    ADLS  Prior functional level: Independent in ADLs/IADLs  Current functional level: Independent in ADLs/IADLs    PT AM-PAC:   /24  OT AM-PAC:   /24    Family can provide assistance at DC: Yes  Would you like Case Management to discuss the discharge plan with any other family members/significant others, and if so, who? Yes (DAUGHTER AND GRANDSON)  Plans to Return to Present Housing: Yes  Other

## 2024-06-21 VITALS
OXYGEN SATURATION: 99 % | TEMPERATURE: 97.7 F | DIASTOLIC BLOOD PRESSURE: 87 MMHG | BODY MASS INDEX: 29.82 KG/M2 | WEIGHT: 179 LBS | SYSTOLIC BLOOD PRESSURE: 117 MMHG | HEART RATE: 89 BPM | HEIGHT: 65 IN | RESPIRATION RATE: 18 BRPM

## 2024-06-21 PROBLEM — R06.02 SHORTNESS OF BREATH: Status: ACTIVE | Noted: 2024-06-21

## 2024-06-21 PROBLEM — R06.02 SHORTNESS OF BREATH: Status: RESOLVED | Noted: 2024-06-21 | Resolved: 2024-06-21

## 2024-06-21 LAB
ANION GAP SERPL CALCULATED.3IONS-SCNC: 9 MEQ/L (ref 9–15)
BASOPHILS # BLD: 0 K/UL (ref 0–0.2)
BASOPHILS NFR BLD: 0.2 %
BUN SERPL-MCNC: 8 MG/DL (ref 6–20)
CALCIUM SERPL-MCNC: 8.8 MG/DL (ref 8.5–9.9)
CHLORIDE SERPL-SCNC: 103 MEQ/L (ref 95–107)
CO2 SERPL-SCNC: 27 MEQ/L (ref 20–31)
CREAT SERPL-MCNC: 0.62 MG/DL (ref 0.5–0.9)
EOSINOPHIL # BLD: 0 K/UL (ref 0–0.7)
EOSINOPHIL NFR BLD: 0 %
ERYTHROCYTE [DISTWIDTH] IN BLOOD BY AUTOMATED COUNT: 14.1 % (ref 11.5–14.5)
GLUCOSE SERPL-MCNC: 94 MG/DL (ref 70–99)
HCT VFR BLD AUTO: 37.9 % (ref 37–47)
HGB BLD-MCNC: 13.1 G/DL (ref 12–16)
LYMPHOCYTES # BLD: 1.7 K/UL (ref 1–4.8)
LYMPHOCYTES NFR BLD: 26.5 %
MCH RBC QN AUTO: 34.7 PG (ref 27–31.3)
MCHC RBC AUTO-ENTMCNC: 34.6 % (ref 33–37)
MCV RBC AUTO: 100.5 FL (ref 79.4–94.8)
MONOCYTES # BLD: 0.4 K/UL (ref 0.2–0.8)
MONOCYTES NFR BLD: 5.5 %
NEUTROPHILS # BLD: 4.4 K/UL (ref 1.4–6.5)
NEUTS SEG NFR BLD: 67.5 %
PLATELET # BLD AUTO: 370 K/UL (ref 130–400)
POTASSIUM SERPL-SCNC: 3.7 MEQ/L (ref 3.4–4.9)
PROT C ACT/NOR PPP: 135 % (ref 83–168)
PROT S ACT/NOR PPP: 115 % (ref 57–131)
RBC # BLD AUTO: 3.77 M/UL (ref 4.2–5.4)
SODIUM SERPL-SCNC: 139 MEQ/L (ref 135–144)
WBC # BLD AUTO: 6.6 K/UL (ref 4.8–10.8)

## 2024-06-21 PROCEDURE — 99223 1ST HOSP IP/OBS HIGH 75: CPT | Performed by: INTERNAL MEDICINE

## 2024-06-21 PROCEDURE — 94640 AIRWAY INHALATION TREATMENT: CPT

## 2024-06-21 PROCEDURE — 36415 COLL VENOUS BLD VENIPUNCTURE: CPT

## 2024-06-21 PROCEDURE — 80048 BASIC METABOLIC PNL TOTAL CA: CPT

## 2024-06-21 PROCEDURE — 6370000000 HC RX 637 (ALT 250 FOR IP): Performed by: STUDENT IN AN ORGANIZED HEALTH CARE EDUCATION/TRAINING PROGRAM

## 2024-06-21 PROCEDURE — 2580000003 HC RX 258: Performed by: STUDENT IN AN ORGANIZED HEALTH CARE EDUCATION/TRAINING PROGRAM

## 2024-06-21 PROCEDURE — 99233 SBSQ HOSP IP/OBS HIGH 50: CPT | Performed by: INTERNAL MEDICINE

## 2024-06-21 PROCEDURE — 94760 N-INVAS EAR/PLS OXIMETRY 1: CPT

## 2024-06-21 PROCEDURE — 6360000002 HC RX W HCPCS: Performed by: STUDENT IN AN ORGANIZED HEALTH CARE EDUCATION/TRAINING PROGRAM

## 2024-06-21 PROCEDURE — 85025 COMPLETE CBC W/AUTO DIFF WBC: CPT

## 2024-06-21 RX ORDER — ALBUTEROL SULFATE 90 UG/1
AEROSOL, METERED RESPIRATORY (INHALATION)
Qty: 8.5 G | Refills: 0 | Status: SHIPPED | OUTPATIENT
Start: 2024-06-21

## 2024-06-21 RX ORDER — ENOXAPARIN SODIUM 100 MG/ML
40 INJECTION SUBCUTANEOUS DAILY
Status: DISCONTINUED | OUTPATIENT
Start: 2024-06-22 | End: 2024-06-21 | Stop reason: HOSPADM

## 2024-06-21 RX ADMIN — Medication 100 MG: at 08:24

## 2024-06-21 RX ADMIN — FLUTICASONE PROPIONATE 1 SPRAY: 50 SPRAY, METERED NASAL at 08:27

## 2024-06-21 RX ADMIN — TIOTROPIUM BROMIDE INHALATION SPRAY 2 PUFF: 3.12 SPRAY, METERED RESPIRATORY (INHALATION) at 07:06

## 2024-06-21 RX ADMIN — FOLIC ACID 500 MCG: 1 TABLET ORAL at 08:26

## 2024-06-21 RX ADMIN — PRAVASTATIN SODIUM 40 MG: 40 TABLET ORAL at 08:26

## 2024-06-21 RX ADMIN — ACETAMINOPHEN 325MG 650 MG: 325 TABLET ORAL at 13:50

## 2024-06-21 RX ADMIN — CETIRIZINE HYDROCHLORIDE 10 MG: 10 TABLET, FILM COATED ORAL at 08:24

## 2024-06-21 RX ADMIN — PANTOPRAZOLE SODIUM 40 MG: 40 TABLET, DELAYED RELEASE ORAL at 08:25

## 2024-06-21 RX ADMIN — SODIUM CHLORIDE, PRESERVATIVE FREE 10 ML: 5 INJECTION INTRAVENOUS at 08:26

## 2024-06-21 RX ADMIN — ENOXAPARIN SODIUM 80 MG: 100 INJECTION SUBCUTANEOUS at 08:26

## 2024-06-21 RX ADMIN — BUDESONIDE AND FORMOTEROL FUMARATE DIHYDRATE 2 PUFF: 160; 4.5 AEROSOL RESPIRATORY (INHALATION) at 07:06

## 2024-06-21 RX ADMIN — AMLODIPINE BESYLATE 10 MG: 10 TABLET ORAL at 08:24

## 2024-06-21 ASSESSMENT — PAIN SCALES - GENERAL: PAINLEVEL_OUTOF10: 8

## 2024-06-21 NOTE — CONSULTS
Spiritual Care Services     Summary of Visit:  Patient was alert and oriented, patient was given a hard copy of HCPOA to review, patient appreciated the visit, patient is ready to go home,       Encounter Summary  Encounter Overview/Reason: Advance Care Planning, Initial Encounter  Service Provided For: Patient  Referral/Consult From: Physician, Nurse  Support System: Children  Complexity of Encounter: Moderate  Begin Time: 1630  End Time : 1645  Total Time Calculated: 15 min        Spiritual/Emotional needs  Type: Spiritual Support                 Advance Care Planning  Type: Refused ACP conversation    Spiritual Assessment/Intervention/Outcomes:    Assessment: Calm, Hopeful    Intervention: Sustaining Presence/Ministry of presence    Outcome: Receptive      Care Plan:    Plan and Referrals  Plan/Referrals: No future visits requested          Spiritual Care Services   Electronically signed by Chaplain Luisito on 6/21/2024 at 5:12 PM.    To reach a  for emotional and spiritual support, place an EPIC consult request.   If a  is needed immediately, dial “0” and ask to page the on-call .

## 2024-06-21 NOTE — CONSULTS
Pulmonary Medicine  Consult Note      Reason for consultation: Shortness of breath, CT chest,  artifact versus PE    Consulting physician: Dr. Castorena    HISTORY OF PRESENT ILLNESS:      This is a 58-year-old female with past medical history significant for severe persistent asthma, GERD, hypertension, hyperlipidemia was presented to ER with chief complaint of shortness of breath.  3 days ago prior to coming to ER patient's air conditioning broke.  At first she thought she has asthma as attack and was taking inhaler but did not help.  Patient continued to have dyspnea and felt like chest pressure She came to ER.  She had CT chest done.  CT chest shows there is area of homogeneous feeling in the right lower lobe and mild straightening of interventricular septum for heart strain.  Patient was started on anticoagulation therapy.  Patient was seen by cardiology and he did not feel patient had PE so pulmonary consult was done. /     I reviewed CT chest with radiologist dr. Go and he is agreeable that this is  likely artifact and no PE.  D-dimer test was negative.  DVT study is negative.  2D echo was done which did not show any right ventricular strain.  Patient denied having any pleuritic pain.  She said her shortness of breath is better and not having any chest tightness.  She has no fever or chills.  She has minimal cough.  No swelling in lower extremity or no calf tenderness.  Room air oxygen saturation is 99%.  ABG on room air shows pH 7.47 pCO2 36 pO2 102 bicarb of 26.7 and saturation 98%      Past Medical History:        Diagnosis Date    Allergic rhinitis     Anxiety disorder     Cleft lip     Divergent strabismus     Diverticulitis 11/2013    s/p partial colectomy, Dr Ortiz    Hyperlipidemia LDL goal <100 2015    Hypertension     Obesity (BMI 30-39.9)     Severe persistent asthma without complication     intubated x 1, 2013    Type 2 diabetes mellitus (HCC) 07/24/2020    A1C 6.8       Past Surgical History:   cardiac silhouette is enlarged Pulmonary vasculature is unremarkable. Right-sided trachea. No focal infiltrate No effusion No pneumothoraces.     1. No acute cardiopulmonary process.       Assessment and  plan:     CT chest, homogeneous feeling in right lower lobe is artifact, no PE, D-dimer is negative  Severe persistent asthma  GERD  Hypertension  Hyperlipidemia  Anxiety disorder  Diabetes mellitus    I reviewed CT chest with radiologist Dr. Curiel.  CT chest does not show any pulmonary embolism, likely artifact.  I doubt any PE since D-dimer is negative, DVT study is negative.  No right ventricular strain in 2D echo.  O2 sat is 99% and pO2 in ABG on room air is 102.  Discussed with Dr. Ramires and Dr. Galdamez.  Suggest to change anticoagulation therapy to prophylactic dose.  No need for long-term coagulation therapy.  Continue bronchodilator therapy as requested.    Thank you for consult  NOTE: This report was transcribed using voice recognition software. Every effort was made to ensure accuracy; however, inadvertent computerized transcription errors may be present.    Electronically signed by David Gerardo MD, FCCP on 6/21/2024 at 2:54 PM

## 2024-06-21 NOTE — PROGRESS NOTES
Butalbital-aspirin-caffeine, Codeine, Floxin [ofloxacin], Rocephin [ceftriaxone sodium], Tomato, Asa [aspirin], and Lisinopril     Review of Systems   Respiratory:  Positive for chest tightness and shortness of breath.    Cardiovascular:  Negative for chest pain, palpitations and leg swelling.   All other systems reviewed and are negative.           VITALS:  Blood pressure 135/84, pulse 78, temperature 97.5 °F (36.4 °C), temperature source Oral, resp. rate 19, height 1.651 m (5' 5\"), weight 79.8 kg (176 lb), last menstrual period 10/07/2017, SpO2 99 %, not currently breastfeeding.  Body mass index is 29.29 kg/m².     Physical Exam  HENT:      Head: Normocephalic.   Cardiovascular:      Rate and Rhythm: Normal rate and regular rhythm.   Pulmonary:      Effort: Pulmonary effort is normal.      Breath sounds: Normal breath sounds.   Musculoskeletal:      Right lower leg: No edema.      Left lower leg: No edema.   Skin:     General: Skin is warm and dry.   Neurological:      General: No focal deficit present.      Mental Status: She is alert.   Psychiatric:         Mood and Affect: Mood normal.            LABS:     Pertinent Labs:  CBC:       Recent Labs     06/20/24  0536   WBC 9.0   HGB 13.8         BMP:      Recent Labs     06/20/24  0536      K 4.4      CO2 24   BUN 11   CREATININE 0.65   GLUCOSE 118*   LABGLOM >90.0      INR:       Recent Labs     06/19/24  1630   INR 1.1      BNP: No results for input(s): \"BNP\" in the last 72 hours.   TSH:         Lab Results   Component Value Date     TSH 1.680 11/21/2022      Cardiac Injury Profile: No results for input(s): \"CKTOTAL\", \"CKMB\", \"CKMBINDEX\", \"TROPONINI\" in the last 72 hours.   Troponin:         Lab Results   Component Value Date/Time     TROPONINI <0.010 11/01/2022 12:45 PM      Lipid Profile:         Lab Results   Component Value Date/Time     TRIG 61 11/27/2017 11:26 AM     HDL 56 07/29/2021 11:22 AM     CHOL 240 11/27/2017 11:26 AM     troponin (60, 103, 117, 126, 119)  HTN  HLD  Severe persistent asthma         PLAN:   As always, aggressive risk factor modification is strongly recommended. We should adhere to the JNC VIII guidelines for HTN management and the NCEPATP III guidelines for LDL-C management.  Continue to monitor on telemetry  Check 2D echocardiogram  Check D dimer   Maximize cardiac medications  CONTINUE FULL DOSE ANTICOAGULATION. TRANSITION TO DOAC PRIOR TO DISCHARGE.  Maintain potassium greater than 4 and magnesium greater than 2  GI/DVT prophylaxis  DR. LUCIA TO REVIEW FILMS . NO PLAN FOR THROMBECTOMY AT THIS TIME  FURTHER RECOMMENDATIONS TO FOLLOW           Thank you for allowing me to participate in the care of your patient, please don't hesitate to contact me if you have any further questions.        Electronically signed by ROOPA Engel CNP on 6/20/2024 at 9:17 AM           Attending Supervising Physician's Attestation Statement  The patient is a 58 y.o. female. I have performed a history and physical examination of the patient. I discussed the case with the nurse practitioner.     I reviewed the patient's Past Medical History, Past Surgical History, Medications, and Allergies.      Physical Exam:  Vitals          Vitals:     06/20/24 0407 06/20/24 0601 06/20/24 0755 06/20/24 0800   BP: 135/84         Pulse: 78     (!) 124   Resp: 19         Temp: 97.5 °F (36.4 °C)         TempSrc: Oral         SpO2: 99% 97% 99%     Weight:           Height:                    Review of Systems - Respiratory ROS: positive for - shortness of breath  Cardiovascular ROS: no chest pain or dyspnea on exertion  Gastrointestinal ROS: no abdominal pain, change in bowel habits, or black or bloody stools     Pulmonary/Chest: clear to auscultation bilaterally- no wheezes, rales or rhonchi, normal air movement, no respiratory distress  Cardiovascular: normal rate, normal S1 and S2, no gallops, intact distal pulses, and no carotid  bruits  Abdomen: soft, non-tender, non-distended, normal bowel sounds, no masses or organomegaly           Active Hospital Problems     Diagnosis Date Noted    Acute pulmonary embolism with acute cor pulmonale, unspecified pulmonary embolism type (HCC) [I26.09] 06/19/2024       Priority: Low         I reviewed and agree with the findings and plan documented in her note .                    ASSESSMENT:     CTA SHOWING ACUTE PULMONARY EMBOLISM PER RADIOLOGIST REPORT WITH CONCERNS OF RV STRAIN?--ON MY REVIEW OF CTA OF CHEST I DO NOT SEE A CLEAR PE.   NEGATIVE  B/L LE VENOUS DUPLEX US  Negative D-dimer  Normal LV function by echo.  No valve abnormalities  Elevated troponin (60, 103, 117, 126, 119)-likely demand ischemia.  Rule out cardiac ischemia  HTN  HLD  Severe persistent asthma         PLAN:   As always, aggressive risk factor modification is strongly recommended. We should adhere to the JNC VIII guidelines for HTN management and the NCEPATP III guidelines for LDL-C management.  Continue to monitor on telemetry  NO INDICATION FOR PE THROMBECTOMY.   WILL CONSULT PULMONARY FOR OPINION AND REVIEW OF CTA OF CHEST.   Maximize cardiac medications  CONTINUE FULL DOSE ANTICOAGULATION FOR NOW UNTIL PULMONARY REVIEW.  Okay from my standpoint to discontinue  Maintain potassium greater than 4 and magnesium greater than 2  GI/DVT prophylaxis  CORONARY EVALUATION IN NEAR FUTURE AS OUTPATIENT GIVEN SOB AND CHEST tightness.  Stable for discharge from cardiology standpoint.    Electronically signed by Wero Castorena DO on 6/21/2024 at 2:00 PM

## 2024-06-21 NOTE — DISCHARGE SUMMARY
Hospital Medicine Discharge Summary    Gabriel Resendiz  :  1965  MRN:  [id number]    Admit date:  2024  Discharge date:  2024    Admitting Physician:  Cornelius Silva MD  Primary Care Physician:  Deon Kerr MD      Discharge Diagnoses:    As below    Chief Complaint   Patient presents with    Shortness of Breath     Hospital Course:     Mild asthma exacerbation  -Initial presentation of shortness of breath and chest heaviness likely due to asthma exacerbation due to extreme heat and pt's A/C being broken  -Improved at time of discharge without significant intervention  -Pt reported she was almost out of her rescue inhaler so a refill was sent for her    Acute PE with cor pulmonale ruled out after further study    -Discussed with cardiology and pulmonology who also had two separate additional radiologist take a look at pt's CTA and all feel the the area in question is an artifact and not an acute PE, pt's d-dimer was also noted to be negative as well as her BLE venous duplex. Echo also showed no evidence of right heart strain  -Discussed in-depth with pt to make sure she understood the change in diagnosis and she felt relieved and expressed understanding     EtOH abuse  -CIWA, no evidence of withdrawal at time of discharge     Chronic Illnesses  Hx severe persistent asthma  -Continue home regimen     HTN  -Continue home amlodipine     Anxiety   -Continue home lexapro    Exam on discharge:   /87   Pulse 89   Temp 97.7 °F (36.5 °C) (Oral)   Resp 18   Ht 1.651 m (5' 5\")   Wt 81.2 kg (179 lb)   LMP 10/07/2017   SpO2 99%   BMI 29.79 kg/m²   Physical Exam  Cardiovascular:      Rate and Rhythm: Normal rate and regular rhythm.   Pulmonary:      Effort: Pulmonary effort is normal. No respiratory distress.   Abdominal:      Palpations: Abdomen is soft.      Tenderness: There is no abdominal tenderness.   Musculoskeletal:      Right lower leg: No edema.      Left lower leg: No edema.

## 2024-06-21 NOTE — DISCHARGE INSTRUCTIONS
Follow up with primary care physician in the next 7 days or sooner if needed. If you do not have a Primary care physician, please schedule an appointment with one. Please ask prior to discharge about a list of local providers.     Please return to ER or call 911 if you develop any significant signs or symptoms.    I may not have addressed all of your medical illnesses or the abnormal blood work or imaging therefore please ask your PCP to obtain [facility] record to follow up on all of the abnormal labs, imaging and findings that I have and have not addressed during your hospitalization.     Discharging you from the hospital does not mean that your medical care ends here and now. You may still need additional work up, investigation, monitoring, and treatment to be handled from this point on by outside providers including your PCP, Specialists and other healthcare providers.     For medication questions, contact your retail pharmacy and your PCP.    Your medical team at [facility] appreciates the opportunity to work with you to get well!    Katrin Galdamez MD  4:03 PM

## 2024-06-21 NOTE — DISCHARGE INSTR - DIET

## 2024-06-22 LAB — F5 P.R506Q BLD/T QL: NEGATIVE

## 2024-06-24 ENCOUNTER — CARE COORDINATION (OUTPATIENT)
Dept: CARE COORDINATION | Age: 59
End: 2024-06-24

## 2024-06-24 NOTE — CARE COORDINATION
Care Transitions Note    Initial Call - Call within 2 business days of discharge: Yes    Attempted to reach patient for transitions of care follow up. Unable to reach patient.    Outreach Attempts:   HIPAA compliant voicemail left for patient.     First attempt made to contact patient for Initial Care Transition call. CTN left HIPAA compliant message requesting return call.      Patient: Gabriel Resendiz    Patient : 1965   MRN: <[id number]>    Reason for Admission: [facility] -24 Severe Persistent Asthma without Complication  Discharge Date: 24  RURS: Readmission Risk Score: 7.9    Last Discharge Facility       Date Complaint Diagnosis Description Type Department Provider    24 Shortness of Breath Acute pulmonary embolism with acute cor pulmonale, unspecified pulmonary embolism type (HCC) ... ED to Hosp-Admission (Discharged) (ADMITTED) Katrin Andrews MD; Quinton Silva..          Was this an external facility discharge? No    Follow Up Appointment:   Patient has hospital follow up appointment scheduled within 7 days of discharge.    Future Appointments         Provider Specialty Dept Phone    2024 9:30 AM Trish Rowland, APRN - CNP Family Medicine [phone]    2024 9:00 AM Gregg Taylor MD Pulmonology [phone]            Plan for follow-up on next business day.      Lucinda Conn LPN

## 2024-06-25 ENCOUNTER — CARE COORDINATION (OUTPATIENT)
Dept: CARE COORDINATION | Age: 59
End: 2024-06-25

## 2024-06-25 NOTE — CARE COORDINATION
Care Transitions Note    Initial Call - Call within 2 business days of discharge: Yes    Attempted to reach patient for transitions of care follow up. Unable to reach patient.    Outreach Attempts:   HIPAA compliant voicemail left for patient.     Second attempt made to contact patient for Initial Care Transition call. CTN left HIPAA compliant message requesting return call. If no response from patient by end of day, CTN will sign off.      Patient: Gabriel Resendiz    Patient : 1965   MRN: <[id number]>    Reason for Admission: -24 Severe Persistent Asthma without Complication   Discharge Date: 24  RURS: Readmission Risk Score: 7.9    Last Discharge Facility       Date Complaint Diagnosis Description Type Department Provider    24 Shortness of Breath Acute pulmonary embolism with acute cor pulmonale, unspecified pulmonary embolism type (HCC) ... ED to Hosp-Admission (Discharged) (ADMITTED) MLOZKatrin Pal MD; Quinton Silva..          Was this an external facility discharge? No    Follow Up Appointment:   Patient has hospital follow up appointment scheduled within 7 days of discharge.    Future Appointments         Provider Specialty Dept Phone    2024 9:30 AM Trish Rowland, APRN - CNP Family Medicine [phone]    7/3/2024 8:30 AM Drew Haynes MD Cardiothoracic Surgery [phone]    2024 9:00 AM Gregg Taylor MD Pulmonology [phone]            Lucinda Conn LPN

## 2024-07-02 DIAGNOSIS — J45.50 SEVERE PERSISTENT ASTHMA, UNSPECIFIED WHETHER COMPLICATED: ICD-10-CM

## 2024-07-02 RX ORDER — PREDNISONE 10 MG/1
TABLET ORAL
Qty: 45 TABLET | Refills: 0 | Status: SHIPPED
Start: 2024-07-02 | End: 2024-07-25 | Stop reason: ALTCHOICE

## 2024-07-02 NOTE — TELEPHONE ENCOUNTER
SAMPLES OF FASENRA 30 (PENS) GIVEN TO PATIENT.    QTY: 1  LOT# [id number]  EXP 2/2026    Last Office Visit:   3/6/2024      Next Visit Date:  Future Appointments   Date Time Provider Department Center   7/3/2024  8:30 AM Robke, Drew M, MD [facility]   7/25/2024 10:30 AM Gregg Taylor MD [facility]

## 2024-07-03 ENCOUNTER — OFFICE VISIT (OUTPATIENT)
Dept: CARDIOTHORACIC SURGERY | Age: 59
End: 2024-07-03
Payer: COMMERCIAL

## 2024-07-03 VITALS
HEIGHT: 65 IN | OXYGEN SATURATION: 95 % | BODY MASS INDEX: 29.32 KG/M2 | TEMPERATURE: 98.4 F | WEIGHT: 176 LBS | HEART RATE: 112 BPM

## 2024-07-03 DIAGNOSIS — K44.9 HIATAL HERNIA: Primary | ICD-10-CM

## 2024-07-03 DIAGNOSIS — K44.9 PARAESOPHAGEAL HERNIA: Primary | ICD-10-CM

## 2024-07-03 PROCEDURE — 99204 OFFICE O/P NEW MOD 45 MIN: CPT | Performed by: THORACIC SURGERY (CARDIOTHORACIC VASCULAR SURGERY)

## 2024-07-03 ASSESSMENT — ENCOUNTER SYMPTOMS
WHEEZING: 0
STRIDOR: 0
SHORTNESS OF BREATH: 0
ABDOMINAL PAIN: 0
ABDOMINAL DISTENTION: 0
SORE THROAT: 0
TROUBLE SWALLOWING: 0
DIARRHEA: 0
VOMITING: 0
COUGH: 0
VOICE CHANGE: 0
CHEST TIGHTNESS: 0
NAUSEA: 0
CHOKING: 0

## 2024-07-03 NOTE — PROGRESS NOTES
Subjective:      Patient ID: Gabriel Resendiz is a 58 y.o. female who presents today for:  Chief Complaint   Patient presents with    Hiatal Hernia       HPI  Patient was recently admitted to the hospital with shortness of breath.  She says this was due to an asthma exacerbation from her air conditioning going out.  CAT scan was obtained and one of the findings was a moderate-sized hiatal hernia.  Patient was referred for surgical evaluation.    Patient is back to her usual state of health.  She works in a nursing home and denies any shortness of breath with her exertion.  She denies any heartburn, dysphagia, nocturnal aspirations.  She does take Protonix but she says that if she does not take that she does not notice any symptoms.    Past Medical History:   Diagnosis Date    Allergic rhinitis     Anxiety disorder     Cleft lip     Divergent strabismus     Diverticulitis 2013    s/p partial colectomy, Dr Ortiz    Hyperlipidemia LDL goal <100     Hypertension     Obesity (BMI 30-39.9)     Severe persistent asthma without complication     intubated x 2013    Type 2 diabetes mellitus (HCC) 2020    A1C 6.8      Past Surgical History:   Procedure Laterality Date     SECTION      x 3    CLEFT LIP REPAIR      COLONOSCOPY      COLONOSCOPY N/A 2023    COLONOSCOPY DIAGNOSTIC performed by Maxi Cervantes MD at [facility]    OTHER SURGICAL HISTORY  2013    Exploratory laparotomy with sigmoid colectomy, drainage pelvic abscess and formation end descending colostomy    OTHER SURGICAL HISTORY  2014    Exploratory lapartomy with extensived lysis of adhesions, takedown of colostmy with primary stapled anastomosis, rigid sigmoidoscopy    UPPER GASTROINTESTINAL ENDOSCOPY N/A 2023    EGD DIAGNOSTIC ONLY performed by Maxi Cervantes MD at [facility]     Social History     Socioeconomic History    Marital status:      Spouse name: Not on file    Number of children: 1

## 2024-07-03 NOTE — PATIENT INSTRUCTIONS
Patient does not want surgery for her hiatal hernia.  She agrees to follow-up in 1 year with a CAT scan.

## 2024-07-05 ENCOUNTER — OFFICE VISIT (OUTPATIENT)
Dept: FAMILY MEDICINE CLINIC | Age: 59
End: 2024-07-05

## 2024-07-05 ENCOUNTER — TELEPHONE (OUTPATIENT)
Dept: FAMILY MEDICINE CLINIC | Age: 59
End: 2024-07-05

## 2024-07-05 VITALS
DIASTOLIC BLOOD PRESSURE: 80 MMHG | RESPIRATION RATE: 14 BRPM | OXYGEN SATURATION: 98 % | SYSTOLIC BLOOD PRESSURE: 138 MMHG | HEART RATE: 99 BPM | BODY MASS INDEX: 29.82 KG/M2 | WEIGHT: 179 LBS | HEIGHT: 65 IN

## 2024-07-05 DIAGNOSIS — H57.9 ITCHY EYES: ICD-10-CM

## 2024-07-05 DIAGNOSIS — E11.9 TYPE 2 DIABETES MELLITUS WITHOUT COMPLICATION, WITHOUT LONG-TERM CURRENT USE OF INSULIN (HCC): ICD-10-CM

## 2024-07-05 DIAGNOSIS — E11.9 TYPE 2 DIABETES MELLITUS WITHOUT COMPLICATION, WITHOUT LONG-TERM CURRENT USE OF INSULIN (HCC): Primary | ICD-10-CM

## 2024-07-05 DIAGNOSIS — R35.89 POLYURIA: ICD-10-CM

## 2024-07-05 DIAGNOSIS — Z09 HOSPITAL DISCHARGE FOLLOW-UP: ICD-10-CM

## 2024-07-05 DIAGNOSIS — J45.50 SEVERE PERSISTENT ASTHMA, UNSPECIFIED WHETHER COMPLICATED: ICD-10-CM

## 2024-07-05 LAB
CREAT UR-MCNC: 144.7 MG/DL
MICROALBUMIN UR-MCNC: <1.2 MG/DL
MICROALBUMIN/CREAT UR-RTO: NORMAL MG/G (ref 0–30)

## 2024-07-05 RX ORDER — TRAZODONE HYDROCHLORIDE 100 MG/1
100 TABLET ORAL NIGHTLY PRN
Qty: 30 TABLET | Refills: 3 | Status: SHIPPED | OUTPATIENT
Start: 2024-07-05

## 2024-07-05 RX ORDER — MONTELUKAST SODIUM 10 MG/1
TABLET ORAL
Qty: 30 TABLET | Refills: 3 | Status: SHIPPED | OUTPATIENT
Start: 2024-07-05

## 2024-07-05 SDOH — ECONOMIC STABILITY: FOOD INSECURITY: WITHIN THE PAST 12 MONTHS, YOU WORRIED THAT YOUR FOOD WOULD RUN OUT BEFORE YOU GOT MONEY TO BUY MORE.: OFTEN TRUE

## 2024-07-05 SDOH — ECONOMIC STABILITY: FOOD INSECURITY: WITHIN THE PAST 12 MONTHS, THE FOOD YOU BOUGHT JUST DIDN'T LAST AND YOU DIDN'T HAVE MONEY TO GET MORE.: OFTEN TRUE

## 2024-07-05 SDOH — ECONOMIC STABILITY: INCOME INSECURITY: HOW HARD IS IT FOR YOU TO PAY FOR THE VERY BASICS LIKE FOOD, HOUSING, MEDICAL CARE, AND HEATING?: SOMEWHAT HARD

## 2024-07-05 ASSESSMENT — PATIENT HEALTH QUESTIONNAIRE - PHQ9
SUM OF ALL RESPONSES TO PHQ QUESTIONS 1-9: 2
1. LITTLE INTEREST OR PLEASURE IN DOING THINGS: SEVERAL DAYS
SUM OF ALL RESPONSES TO PHQ9 QUESTIONS 1 & 2: 2
SUM OF ALL RESPONSES TO PHQ QUESTIONS 1-9: 2
2. FEELING DOWN, DEPRESSED OR HOPELESS: SEVERAL DAYS

## 2024-07-05 NOTE — PATIENT INSTRUCTIONS
[employer]*  (Call [employer]/211 if need more resources.)        MEDICAL:    [facility]   What they offer: [employer] discounts fees for qualifying insured and uninsured persons.  We can assist you in signing up for Medicaid, Medicare, and Marketplace Exchange insurance plans.  They offer 4 locations in [place], 2 locations in [place] and 1 in [facility].    Phone Number: [phone]  Website: [email or web address]    [facility]:  What they offer: We provide health care services to the uninsured and underinsured. From providing quality care to connecting patients to critical community resources, our patients and their needs are our highest priority.  Phone number: [phone]  Website: https://[employer]   [facility] Financial Assistance:  What they offer: Assistance with medical bills  Phone number: 1-793.849.8322 option 5    UTILITY:         [employer]/211:  What they offer: Help find lower cost options for phone or internet as well as help paying utility bills.   Phone Number: 211  Website: https://[employer]/get-help/utilities-expenses   [employer]  What they offer:  Assistance with utilities  Phone:  738.684.6082    [facility] Community [facility]   What they Offer: Assistance with utilities  Phone: 437.978.8147  Website: https://www.Kick[employer].UUCUN/    [facility] [place]  What they Offer: Assistance with utilities  Phone: 786.749.4452  Website: https://[employer] [place] [employer].org/    MEDICATIONS:   [facility]  What they offer: [employer] tracks prescription drug prices and provides free drug coupons for discounts on medications.  Website: https://www.doxIQ/  [employer]:  What they offer: [employer] offers free information on medications and healthcare cost savings programs including prescription assistance programs, coupons, and discount programs.  Helpline: 842.487.3369  Website: https://www.needPeerless Networkeds.org  [employer] Assist:  What they offer: Medication

## 2024-07-05 NOTE — TELEPHONE ENCOUNTER
LMOM for Pt to call back Per  she can come in at 11:30am today. If she agrees please put her on the schedule.

## 2024-07-05 NOTE — PROGRESS NOTES
Post-Discharge Transitional Care  Follow Up      Gabriel Resendiz   YOB: 1965    Date of Office Visit:  7/5/2024  Date of Hospital Admission: 6/19/24  Date of Hospital Discharge: 6/21/24  Risk of hospital readmission (high >=14%. Medium >=10%) :Readmission Risk Score: 7.9      Care management risk score Rising risk (score 2-5) and Complex Care (Scores >=6): No Risk Score On File     Non face to face  following discharge, date last encounter closed (first attempt may have been earlier): 06/25/2024    Call initiated 2 business days of discharge: Yes    ASSESSMENT/PLAN:   Type 2 diabetes mellitus without complication, without long-term current use of insulin (HCC)  Severe persistent asthma, unspecified whether complicated  The following orders have not been finalized:  -     montelukast (SINGULAIR) 10 MG tablet  Hospital discharge follow-up  -     MT DISCHARGE MEDS RECONCILED W/ CURRENT OUTPATIENT MED LIST  -     MT DISCHARGE MEDS RECONCILED W/ CURRENT OUTPATIENT MED LIST      Medical Decision Making: moderate complexity  No follow-ups on file.    On this date 7/5/2024 I have spent 30 minutes reviewing previous notes, test results and face to face with the patient discussing the diagnosis and importance of compliance with the treatment plan as well as documenting on the day of the visit.       Subjective:   HPI:  Follow up of Hospital problems/diagnosis(es): Asthma exacerbation    Inpatient course: Discharge summary reviewed- see chart.    Interval history/Current status: 58-year-old female hospitalized for an asthma exacerbation.  A workup for pulmonary embolism was conducted and noted to be negative.  The patient is presently receiving a prednisone taper.  She reports bilateral eye itching       At present he denies polyuria,  Polydipsia, constitutional, sinus, visual, cardiopulmonary, urologic, gastrointestinal, immunologic/hematologic, musculoskeletal, neurologic,dermatologic, or psychiatric

## 2024-07-06 LAB
ESTIMATED AVERAGE GLUCOSE: 100 MG/DL
HBA1C MFR BLD: 5.1 % (ref 4–6)

## 2024-07-08 RX ORDER — BENRALIZUMAB 30 MG/ML
30 INJECTION, SOLUTION SUBCUTANEOUS
Qty: 1 ADJUSTABLE DOSE PRE-FILLED PEN SYRINGE | Refills: 0 | COMMUNITY
Start: 2024-07-08

## 2024-07-25 ENCOUNTER — OFFICE VISIT (OUTPATIENT)
Dept: PULMONOLOGY | Age: 59
End: 2024-07-25
Payer: COMMERCIAL

## 2024-07-25 VITALS
RESPIRATION RATE: 16 BRPM | HEIGHT: 65 IN | HEART RATE: 98 BPM | WEIGHT: 176.6 LBS | SYSTOLIC BLOOD PRESSURE: 126 MMHG | TEMPERATURE: 97.2 F | DIASTOLIC BLOOD PRESSURE: 86 MMHG | BODY MASS INDEX: 29.42 KG/M2 | OXYGEN SATURATION: 98 %

## 2024-07-25 DIAGNOSIS — R05.3 CHRONIC COUGH: ICD-10-CM

## 2024-07-25 DIAGNOSIS — K21.9 GASTROESOPHAGEAL REFLUX DISEASE WITHOUT ESOPHAGITIS: ICD-10-CM

## 2024-07-25 DIAGNOSIS — R20.0 NUMBNESS AND TINGLING OF RIGHT THUMB: ICD-10-CM

## 2024-07-25 DIAGNOSIS — J45.50 SEVERE PERSISTENT ASTHMA, UNSPECIFIED WHETHER COMPLICATED: ICD-10-CM

## 2024-07-25 DIAGNOSIS — J45.50 SEVERE PERSISTENT ASTHMA, UNSPECIFIED WHETHER COMPLICATED: Primary | ICD-10-CM

## 2024-07-25 DIAGNOSIS — R20.2 NUMBNESS AND TINGLING OF RIGHT THUMB: ICD-10-CM

## 2024-07-25 DIAGNOSIS — K44.9 HIATAL HERNIA: ICD-10-CM

## 2024-07-25 PROCEDURE — 3079F DIAST BP 80-89 MM HG: CPT | Performed by: INTERNAL MEDICINE

## 2024-07-25 PROCEDURE — 3074F SYST BP LT 130 MM HG: CPT | Performed by: INTERNAL MEDICINE

## 2024-07-25 PROCEDURE — 99214 OFFICE O/P EST MOD 30 MIN: CPT | Performed by: INTERNAL MEDICINE

## 2024-07-25 RX ORDER — BENRALIZUMAB 30 MG/ML
30 INJECTION, SOLUTION SUBCUTANEOUS
Qty: 1 ADJUSTABLE DOSE PRE-FILLED PEN SYRINGE | Refills: 0 | Status: SHIPPED | OUTPATIENT
Start: 2024-07-25

## 2024-07-25 RX ORDER — DEXTROMETHORPHAN HYDROBROMIDE AND PROMETHAZINE HYDROCHLORIDE 15; 6.25 MG/5ML; MG/5ML
SYRUP ORAL
Qty: 150 ML | Refills: 0 | Status: SHIPPED | OUTPATIENT
Start: 2024-07-25

## 2024-07-25 RX ORDER — BUDESONIDE 0.5 MG/2ML
500 INHALANT ORAL 2 TIMES DAILY
Qty: 360 ML | Refills: 0 | Status: SHIPPED | OUTPATIENT
Start: 2024-07-25 | End: 2024-10-23

## 2024-07-25 NOTE — PROGRESS NOTES
Skin:     General: Skin is warm and dry.      Findings: No erythema.   Neurological:      Mental Status: She is alert and oriented to person, place, and time.   Psychiatric:         Judgment: Judgment normal.         Imaging studies reviewed and interpreted by me CT chest June 2024, no interstitial lung disease, no infiltrate  Lab results reviewed in chart  PFT   February 2024, FEV1 38% FEV1/FVC 0.48, positive response to bronchodilator, air trapping, and normal DLCO  February 2021, FEV1 57% FEV1/FVC 0.57   Home sleep study April 2022 negative  Assessment and Plan       Diagnosis Orders   1. Severe persistent asthma, unspecified whether complicated  Benralizumab (FASENRA PEN) 30 MG/ML SOAJ    budesonide (PULMICORT) 0.5 MG/2ML nebulizer suspension      2. Chronic cough  promethazine-dextromethorphan (PROMETHAZINE-DM) 6.25-15 MG/5ML syrup      3. Numbness and tingling of right thumb  Terrell Baer MD, Neurology, [place]      4. Hiatal hernia        5. Gastroesophageal reflux disease without esophagitis          Asthma, severe persistent s   Continue Breztri  Continue Singulair  Budesonide nebs  And Fasenra  As needed albuterol  Chronic GERD, declined hernia   repair  Yearly flu shot    continue PPI  Continue Flonase  Referred to neurology for right thumb numbness occurred after ABG sample   attempt       Orders Placed This Encounter   Procedures    Terrell Baer MD, Neurology, [place]     Referral Priority:   Routine     Referral Type:   Eval and Treat     Referral Reason:   Specialty Services Required     Referred to Provider:   Terrell Lopez MD     Requested Specialty:   Neurology     Number of Visits Requested:   1     Orders Placed This Encounter   Medications    Benralizumab (FASENRA PEN) 30 MG/ML SOAJ     Sig: Inject 1 mL into the skin every 2 months     Dispense:  1 Adjustable Dose Pre-filled Pen Syringe     Refill:  0     LOT# [id number]    EXP: 2/26     Order Specific Question:   Expiration Date

## 2024-07-26 RX ORDER — MONTELUKAST SODIUM 10 MG/1
10 TABLET ORAL NIGHTLY
Qty: 90 TABLET | Refills: 3 | Status: SHIPPED | OUTPATIENT
Start: 2024-07-26

## 2024-07-26 NOTE — TELEPHONE ENCOUNTER
Future Appointments    Encounter Information   Provider Department Appt Notes   8/12/2024 Deon Kerr MD [facility] Primary and Specialty Care follow up   11/25/2024 Gregg Taylor MD [facility] Pulmonology 4m fu   7/3/2025 [place] CT ROOM 2 [facility] CT Scan    7/3/2025 Drew Haynes MD [facility] Thoracic Surgery 1 year ct fu     Past Visits    Date Provider Specialty Visit Type Primary Dx   07/25/2024 Gregg Taylor MD Pulmonology Office Visit Severe persistent asthma, unspecified whether complicated   07/05/2024 Deon Kerr MD Family Medicine Office Visit Type 2 diabetes mellitus without complication, without long-term current use of insulin (HCC)

## 2024-07-29 DIAGNOSIS — J45.50 SEVERE PERSISTENT ASTHMA, UNSPECIFIED WHETHER COMPLICATED: Primary | ICD-10-CM

## 2024-07-29 NOTE — TELEPHONE ENCOUNTER
Rx requested:  Requested Prescriptions     Pending Prescriptions Disp Refills    Benralizumab (FASENRA PEN) 30 MG/ML SOAJ 0.28 mL 5     Sig: Inject 1 pen into skin (subcutaneous) every 8 weeks       Last Office Visit:   7/25/2024      Next Visit Date:  Future Appointments   Date Time Provider Department Center   8/12/2024  2:45 PM Deon Kerr MD [facility]   11/25/2024 10:45 AM [facility]   7/3/2025  9:00 AM [place] ROOM 2 [facility] CT [facility] Fac RAD   7/3/2025  9:30 AM Drew Haynes MD [facility] [place]

## 2024-07-30 RX ORDER — ALBUTEROL SULFATE 2.5 MG/3ML
SOLUTION RESPIRATORY (INHALATION)
Qty: 3 EACH | Refills: 2 | Status: SHIPPED | OUTPATIENT
Start: 2024-07-30

## 2024-07-30 RX ORDER — BENRALIZUMAB 30 MG/ML
INJECTION, SOLUTION SUBCUTANEOUS
Qty: 0.28 ML | Refills: 5 | Status: ACTIVE | OUTPATIENT
Start: 2024-07-30

## 2024-08-08 NOTE — TELEPHONE ENCOUNTER
Requested Prescriptions     Pending Prescriptions Disp Refills    predniSONE (DELTASONE) 10 MG tablet [Pharmacy Med Name: PREDNISONE 10MG** TABLETS] 20 tablet 0     Sig: TAKE 2 TABLETS BY MOUTH TWICE DAILY FOR 5 DAYS [Consultation: ________] : [unfilled] is a new patient being seen for a [unfilled] consultation visit [Medical Records] : medical records [Mother] : mother [Patient] : patient

## 2024-08-12 ENCOUNTER — TELEPHONE (OUTPATIENT)
Dept: FAMILY MEDICINE CLINIC | Age: 59
End: 2024-08-12

## 2024-08-12 NOTE — TELEPHONE ENCOUNTER
LMOM for pt to cb. Pt was accidentally double booked for 2:45pm and Per Dr. Kerr he's would like to see if she can come in at 4:00pm to be seen at 5:15 or sooner depending on patient flow
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
## 2024-08-22 ENCOUNTER — OFFICE VISIT (OUTPATIENT)
Dept: FAMILY MEDICINE CLINIC | Age: 59
End: 2024-08-22
Payer: COMMERCIAL

## 2024-08-22 VITALS
TEMPERATURE: 97.7 F | DIASTOLIC BLOOD PRESSURE: 93 MMHG | OXYGEN SATURATION: 98 % | HEIGHT: 65 IN | WEIGHT: 176.6 LBS | BODY MASS INDEX: 29.42 KG/M2 | SYSTOLIC BLOOD PRESSURE: 145 MMHG | HEART RATE: 101 BPM

## 2024-08-22 DIAGNOSIS — I10 ESSENTIAL HYPERTENSION: ICD-10-CM

## 2024-08-22 DIAGNOSIS — E11.9 TYPE 2 DIABETES MELLITUS WITHOUT COMPLICATION, WITHOUT LONG-TERM CURRENT USE OF INSULIN (HCC): Primary | ICD-10-CM

## 2024-08-22 PROCEDURE — 3080F DIAST BP >= 90 MM HG: CPT | Performed by: INTERNAL MEDICINE

## 2024-08-22 PROCEDURE — 3077F SYST BP >= 140 MM HG: CPT | Performed by: INTERNAL MEDICINE

## 2024-08-22 PROCEDURE — 99214 OFFICE O/P EST MOD 30 MIN: CPT | Performed by: INTERNAL MEDICINE

## 2024-08-22 PROCEDURE — 3044F HG A1C LEVEL LT 7.0%: CPT | Performed by: INTERNAL MEDICINE

## 2024-08-22 RX ORDER — HYDROXYZINE HYDROCHLORIDE 25 MG/1
25 TABLET, FILM COATED ORAL NIGHTLY
Qty: 30 TABLET | Refills: 0 | Status: SHIPPED | OUTPATIENT
Start: 2024-08-22 | End: 2024-09-21

## 2024-08-22 RX ORDER — LIDOCAINE 50 MG/G
OINTMENT TOPICAL
Qty: 30 G | Refills: 0 | Status: SHIPPED | OUTPATIENT
Start: 2024-08-22

## 2024-08-22 RX ORDER — CYCLOBENZAPRINE HCL 5 MG
5 TABLET ORAL 2 TIMES DAILY PRN
Qty: 10 TABLET | Refills: 0 | Status: SHIPPED | OUTPATIENT
Start: 2024-08-22 | End: 2024-09-01

## 2024-08-29 ENCOUNTER — TELEPHONE (OUTPATIENT)
Dept: FAMILY MEDICINE CLINIC | Age: 59
End: 2024-08-29

## 2024-08-29 RX ORDER — PREDNISONE 10 MG/1
TABLET ORAL
Qty: 45 TABLET | Refills: 0 | Status: SHIPPED | OUTPATIENT
Start: 2024-08-29 | End: 2024-09-05

## 2024-08-29 NOTE — TELEPHONE ENCOUNTER
Patient requested work letter to be faxed the number she gave Dr. Kerr [phone] I faxed letter and I received a transmission error I wanted to confirm that I have the correct number . Unable to reach LVM to  to confirm . Also letter will be up front for patient to

## 2024-09-04 DIAGNOSIS — J45.50 SEVERE PERSISTENT ASTHMA, UNSPECIFIED WHETHER COMPLICATED: ICD-10-CM

## 2024-09-04 NOTE — TELEPHONE ENCOUNTER
Future Appointments    Encounter Information   Provider Department Appt Notes   11/25/2024 Gregg Taylor MD [facility] Pulmonology 4m fu   11/26/2024 Deon Kerr MD [facility] Primary and Specialty Care Return in about 3 months (around 11/22/2024).   7/3/2025 [place] CT ROOM 2 [facility] CT Scan    7/3/2025 Drew Haynes MD [facility] Thoracic Surgery 1 year ct fu     Past Visits    Date Provider Specialty Visit Type Primary Dx   08/22/2024 Deon Kerr MD Family Medicine Office Visit Type 2 diabetes mellitus without complication, without long-term current use of insulin (HCC)   07/25/2024 Gregg Taylor MD Pulmonology Office Visit Severe persistent asthma, unspecified whether complicated   07/05/2024 Deon Kerr MD Family Medicine Office Visit Type 2 diabetes mellitus without complication, without long-term current use of insulin (HCC)

## 2024-09-05 RX ORDER — PREDNISONE 10 MG/1
TABLET ORAL
Qty: 45 TABLET | Refills: 0 | Status: SHIPPED | OUTPATIENT
Start: 2024-09-05

## 2024-09-05 RX ORDER — BENZONATATE 100 MG/1
CAPSULE ORAL
Qty: 30 CAPSULE | Refills: 4 | OUTPATIENT
Start: 2024-09-05

## 2024-10-07 DIAGNOSIS — J45.50 SEVERE PERSISTENT ASTHMA, UNSPECIFIED WHETHER COMPLICATED: ICD-10-CM

## 2024-10-08 RX ORDER — ALBUTEROL SULFATE 0.83 MG/ML
SOLUTION RESPIRATORY (INHALATION)
Qty: 360 ML | Refills: 3 | Status: SHIPPED | OUTPATIENT
Start: 2024-10-08

## 2024-10-08 RX ORDER — BENZONATATE 100 MG/1
CAPSULE ORAL
Qty: 30 CAPSULE | Refills: 4 | Status: SHIPPED | OUTPATIENT
Start: 2024-10-08

## 2024-10-08 NOTE — TELEPHONE ENCOUNTER
Rx requested:  Requested Prescriptions     Pending Prescriptions Disp Refills    benzonatate (TESSALON) 100 MG capsule [Pharmacy Med Name: BENZONATATE 100MG CAPS] 30 capsule 4     Sig: TAKE ONE CAPSULE BY MOUTH THREE TIMES A DAY AS NEEDED FOR COUGH    albuterol (PROVENTIL) (2.5 MG/3ML) 0.083% nebulizer solution [Pharmacy Med Name: ALBUTEROL SULFATE 2.5 MG/3ML NEBU] 450 mL 3     Sig: INHALE CONTENTS OF ONE VIAL VIA NEBULIZER EVERY 6 HOURS AS NEEDED       Last Office Visit:   7/25/2024      Next Visit Date:  Future Appointments   Date Time Provider Department Center   11/25/2024 10:45 AM Gregg Taylor MD [place] [facility]   11/26/2024  9:45 AM Deon Kerr MD [facility]   7/3/2025  9:00 AM [place] CT ROOM 2 [facility] Fac RAD   7/3/2025  9:30 AM Robke, Drew M, MD [facility]

## 2024-11-24 NOTE — PROGRESS NOTES
Subjective:      Patient ID: Gabriel Resendiz is a 58 y.o. female Established patient, here for evaluation of the following chief complaint(s):  Chief Complaint   Patient presents with    Diabetes         HPI  70-year-old hypertensive hyperlipidemic prediabetic presents for follow-up visit.    Severe persistent asthma symptoms are well-controlled at this time.  The patient is followed by pulmonary medicine.  She requests FMLA form to be completed with Singulair and albuterol as needed    Hypertension, unspecified type-norvasc 10 mg daily.  The patient's blood pressure is elevated at this time.  She is under treatment amount of stress at this time.  The patient's blood pressure is presently 130/80.      Hyperlipidemia, unspecified hyperlipidemia type-pravachol 40 mg daily.        Anxiety: Patient symptoms are well-controlled she would like a refill for Atarax which she takes for anxiety.    Prediabetes: The patient had steroid-induced diabetes in the past with an A1c that exceeded 6.5.  However her most recent readings have all been in the prediabetic range.      History of positive urine toxicology screen: I have explained to the patient that her urine toxicology screen is positive.  The patient may not be prescribed controlled substances until she obtains a cannabis card.        HM: due for mammogram.      At present he denies polyuria,  Polydipsia, constitutional, sinus, visual, cardiopulmonary, urologic, gastrointestinal, immunologic/hematologic, musculoskeletal, neurologic,dermatologic, or psychiatric complaints.            Current Outpatient Medications on File Prior to Visit   Medication Sig Dispense Refill    benzonatate (TESSALON) 100 MG capsule TAKE ONE CAPSULE BY MOUTH THREE TIMES A DAY AS NEEDED FOR COUGH (Patient not taking: Reported on 11/25/2024) 30 capsule 4    albuterol (PROVENTIL) (2.5 MG/3ML) 0.083% nebulizer solution INHALE CONTENTS OF ONE VIAL VIA NEBULIZER EVERY 6 HOURS AS NEEDED 360 mL 3

## 2024-11-25 ENCOUNTER — OFFICE VISIT (OUTPATIENT)
Dept: PULMONOLOGY | Age: 59
End: 2024-11-25
Payer: COMMERCIAL

## 2024-11-25 VITALS
HEART RATE: 101 BPM | BODY MASS INDEX: 29.32 KG/M2 | DIASTOLIC BLOOD PRESSURE: 82 MMHG | WEIGHT: 176 LBS | TEMPERATURE: 97.3 F | OXYGEN SATURATION: 97 % | HEIGHT: 65 IN | SYSTOLIC BLOOD PRESSURE: 128 MMHG | RESPIRATION RATE: 18 BRPM

## 2024-11-25 DIAGNOSIS — K44.9 HIATAL HERNIA: Primary | ICD-10-CM

## 2024-11-25 DIAGNOSIS — J45.50 SEVERE PERSISTENT ASTHMA, UNSPECIFIED WHETHER COMPLICATED: ICD-10-CM

## 2024-11-25 DIAGNOSIS — K21.9 GASTROESOPHAGEAL REFLUX DISEASE WITHOUT ESOPHAGITIS: ICD-10-CM

## 2024-11-25 PROCEDURE — 99214 OFFICE O/P EST MOD 30 MIN: CPT | Performed by: INTERNAL MEDICINE

## 2024-11-25 PROCEDURE — 3074F SYST BP LT 130 MM HG: CPT | Performed by: INTERNAL MEDICINE

## 2024-11-25 PROCEDURE — 3079F DIAST BP 80-89 MM HG: CPT | Performed by: INTERNAL MEDICINE

## 2024-11-26 ENCOUNTER — OFFICE VISIT (OUTPATIENT)
Dept: FAMILY MEDICINE CLINIC | Age: 59
End: 2024-11-26

## 2024-11-26 VITALS
BODY MASS INDEX: 29.32 KG/M2 | WEIGHT: 176 LBS | OXYGEN SATURATION: 98 % | SYSTOLIC BLOOD PRESSURE: 130 MMHG | RESPIRATION RATE: 14 BRPM | HEIGHT: 65 IN | HEART RATE: 97 BPM | DIASTOLIC BLOOD PRESSURE: 80 MMHG

## 2024-11-26 DIAGNOSIS — J45.50 SEVERE PERSISTENT ASTHMA, UNSPECIFIED WHETHER COMPLICATED: ICD-10-CM

## 2024-11-26 DIAGNOSIS — E78.5 HYPERLIPIDEMIA, UNSPECIFIED HYPERLIPIDEMIA TYPE: ICD-10-CM

## 2024-11-26 DIAGNOSIS — I10 ESSENTIAL HYPERTENSION: ICD-10-CM

## 2024-11-26 DIAGNOSIS — Z12.39 ENCOUNTER FOR SCREENING FOR MALIGNANT NEOPLASM OF BREAST, UNSPECIFIED SCREENING MODALITY: Primary | ICD-10-CM

## 2024-11-26 RX ORDER — CYCLOBENZAPRINE HCL 10 MG
10 TABLET ORAL NIGHTLY PRN
Qty: 30 TABLET | Refills: 0 | Status: SHIPPED | OUTPATIENT
Start: 2024-11-26 | End: 2024-12-26

## 2024-11-26 RX ORDER — HYDROXYZINE HYDROCHLORIDE 25 MG/1
25 TABLET, FILM COATED ORAL NIGHTLY
Qty: 30 TABLET | Refills: 0 | Status: SHIPPED | OUTPATIENT
Start: 2024-11-26 | End: 2024-12-26

## 2024-11-26 RX ORDER — AMLODIPINE BESYLATE 10 MG/1
10 TABLET ORAL DAILY
Qty: 90 TABLET | Refills: 1 | Status: SHIPPED | OUTPATIENT
Start: 2024-11-26 | End: 2025-05-25

## 2024-12-31 DIAGNOSIS — R05.3 CHRONIC COUGH: ICD-10-CM

## 2024-12-31 DIAGNOSIS — J45.50 SEVERE PERSISTENT ASTHMA, UNSPECIFIED WHETHER COMPLICATED: ICD-10-CM

## 2025-01-02 RX ORDER — ALBUTEROL SULFATE 90 UG/1
INHALANT RESPIRATORY (INHALATION)
Qty: 8.5 G | Refills: 3 | Status: SHIPPED | OUTPATIENT
Start: 2025-01-02

## 2025-01-02 RX ORDER — DEXTROMETHORPHAN HYDROBROMIDE AND PROMETHAZINE HYDROCHLORIDE 15; 6.25 MG/5ML; MG/5ML
SYRUP ORAL
Qty: 150 ML | Refills: 0 | Status: SHIPPED | OUTPATIENT
Start: 2025-01-02

## 2025-01-02 NOTE — TELEPHONE ENCOUNTER
Rx requested:  Requested Prescriptions     Pending Prescriptions Disp Refills    promethazine-dextromethorphan (PROMETHAZINE-DM) 6.25-15 MG/5ML syrup [Pharmacy Med Name: PROMETHAZINE-DM 6.25-15MG/5ML SYRP] 150 mL 0     Sig: TAKE 5ML BY MOUTH FOUR TIMES A DAY AS NEEDED FOR COUGH    albuterol sulfate HFA (PROVENTIL;VENTOLIN;PROAIR) 108 (90 Base) MCG/ACT inhaler [Pharmacy Med Name: ALBUTEROL SULFATE  AERS] 8.5 g 3     Sig: INHALE TWO PUFFS BY MOUTH FOUR TIMES A DAY AS NEEDED FOR WHEEZING       Last Office Visit:   11/25/2024      Next Visit Date:  Future Appointments   Date Time Provider Department Center   2/26/2025  8:30 AM Deon Kerr MD [facility]   3/31/2025  9:00 AM Gregg Taylor MD [place] [facility]   7/3/2025  9:00 AM [place] CT ROOM 2 [facility] Fac RAD   7/3/2025  9:30 AM Drew Haynes MD [facility]

## 2025-02-20 RX ORDER — HYDROXYZINE HYDROCHLORIDE 25 MG/1
25 TABLET, FILM COATED ORAL NIGHTLY
Qty: 30 TABLET | Refills: 0 | Status: SHIPPED | OUTPATIENT
Start: 2025-02-20 | End: 2025-03-22

## 2025-02-20 NOTE — TELEPHONE ENCOUNTER
requesting medication refill. Please approve or deny this request.    Rx requested:  Requested Prescriptions     Pending Prescriptions Disp Refills    hydrOXYzine HCl (ATARAX) 25 MG tablet [Pharmacy Med Name: HYDROXYZINE HCL 25MG TABS] 30 tablet 0     Sig: TAKE 1 TABLET BY MOUTH NIGHTLY         Last Office Visit:   11/26/2024      Next Visit Date:  Future Appointments   Date Time Provider Department Center   2/26/2025  8:30 AM Deon Kerr MD [facility]   3/31/2025  9:00 AM Gregg Taylor MD [place] [facility]   7/3/2025  9:00 AM [place] CT ROOM 2 [facility] CT [facility] Fac RAD   7/3/2025  9:30 AM Robke, Drew M, MD [facility]

## 2025-02-24 SDOH — ECONOMIC STABILITY: INCOME INSECURITY: IN THE LAST 12 MONTHS, WAS THERE A TIME WHEN YOU WERE NOT ABLE TO PAY THE MORTGAGE OR RENT ON TIME?: NO

## 2025-02-24 SDOH — ECONOMIC STABILITY: FOOD INSECURITY: WITHIN THE PAST 12 MONTHS, YOU WORRIED THAT YOUR FOOD WOULD RUN OUT BEFORE YOU GOT MONEY TO BUY MORE.: NEVER TRUE

## 2025-02-24 SDOH — ECONOMIC STABILITY: FOOD INSECURITY: WITHIN THE PAST 12 MONTHS, THE FOOD YOU BOUGHT JUST DIDN'T LAST AND YOU DIDN'T HAVE MONEY TO GET MORE.: NEVER TRUE

## 2025-02-24 ASSESSMENT — PATIENT HEALTH QUESTIONNAIRE - PHQ9
2. FEELING DOWN, DEPRESSED OR HOPELESS: SEVERAL DAYS
SUM OF ALL RESPONSES TO PHQ9 QUESTIONS 1 & 2: 2
SUM OF ALL RESPONSES TO PHQ QUESTIONS 1-9: 2
1. LITTLE INTEREST OR PLEASURE IN DOING THINGS: SEVERAL DAYS
2. FEELING DOWN, DEPRESSED OR HOPELESS: SEVERAL DAYS
SUM OF ALL RESPONSES TO PHQ QUESTIONS 1-9: 2
SUM OF ALL RESPONSES TO PHQ QUESTIONS 1-9: 2
SUM OF ALL RESPONSES TO PHQ9 QUESTIONS 1 & 2: 2
SUM OF ALL RESPONSES TO PHQ QUESTIONS 1-9: 2
1. LITTLE INTEREST OR PLEASURE IN DOING THINGS: SEVERAL DAYS

## 2025-02-25 RX ORDER — PREDNISONE 10 MG/1
TABLET ORAL
Qty: 45 TABLET | Refills: 0 | Status: SHIPPED | OUTPATIENT
Start: 2025-02-25

## 2025-02-26 ENCOUNTER — OFFICE VISIT (OUTPATIENT)
Age: 60
End: 2025-02-26

## 2025-02-26 VITALS
RESPIRATION RATE: 14 BRPM | HEART RATE: 73 BPM | DIASTOLIC BLOOD PRESSURE: 84 MMHG | WEIGHT: 172 LBS | SYSTOLIC BLOOD PRESSURE: 138 MMHG | HEIGHT: 65 IN | BODY MASS INDEX: 28.66 KG/M2 | OXYGEN SATURATION: 96 %

## 2025-02-26 DIAGNOSIS — R35.0 URINARY FREQUENCY: ICD-10-CM

## 2025-02-26 DIAGNOSIS — R05.9 COUGH, UNSPECIFIED TYPE: ICD-10-CM

## 2025-02-26 DIAGNOSIS — I10 ESSENTIAL HYPERTENSION: Primary | ICD-10-CM

## 2025-02-26 DIAGNOSIS — E11.9 TYPE 2 DIABETES MELLITUS WITHOUT COMPLICATION, WITHOUT LONG-TERM CURRENT USE OF INSULIN (HCC): ICD-10-CM

## 2025-02-26 DIAGNOSIS — T14.8XXA MUSCLE STRAIN: ICD-10-CM

## 2025-02-26 DIAGNOSIS — J45.50 SEVERE PERSISTENT ASTHMA, UNSPECIFIED WHETHER COMPLICATED (HCC): ICD-10-CM

## 2025-02-26 DIAGNOSIS — R50.9 FEVER, UNSPECIFIED FEVER CAUSE: ICD-10-CM

## 2025-02-26 LAB
BILIRUB UR QL STRIP: NEGATIVE
CLARITY UR: CLEAR
COLOR UR: YELLOW
GLUCOSE UR STRIP-MCNC: NEGATIVE MG/DL
HGB UR QL STRIP: NEGATIVE
INFLUENZA A ANTIBODY: NORMAL
INFLUENZA B ANTIBODY: NORMAL
KETONES UR STRIP-MCNC: NEGATIVE MG/DL
LEUKOCYTE ESTERASE UR QL STRIP: NEGATIVE
Lab: NORMAL
NITRITE UR QL STRIP: NEGATIVE
PERFORMING INSTRUMENT: NORMAL
PH UR STRIP: 7 [PH] (ref 5–9)
PROCALCITONIN SERPL IA-MCNC: 0.04 NG/ML (ref 0–0.15)
PROT UR STRIP-MCNC: NEGATIVE MG/DL
QC PASS/FAIL: NORMAL
SARS-COV-2, POC: NORMAL
SP GR UR STRIP: 1.01 (ref 1–1.03)
URINE REFLEX TO CULTURE: NORMAL
UROBILINOGEN UR STRIP-ACNC: 0.2 E.U./DL

## 2025-02-26 RX ORDER — AZITHROMYCIN 250 MG/1
TABLET, FILM COATED ORAL
Qty: 6 TABLET | Refills: 0 | Status: SHIPPED | OUTPATIENT
Start: 2025-02-26 | End: 2025-03-08

## 2025-02-26 RX ORDER — PREDNISONE 10 MG/1
TABLET ORAL
Qty: 20 TABLET | Refills: 2 | Status: SHIPPED | OUTPATIENT
Start: 2025-02-26

## 2025-02-26 NOTE — PROGRESS NOTES
Gabriel [place] (:  1965) is a 59 y.o. female, Established patient, here for evaluation of the following chief complaint(s):  Hypertension, Diabetes, Flank Pain (Eft side flank pain), Urinary Frequency, Diarrhea, and Fatigue          Subjective   History of Present Illness  The patient presents for evaluation of cough and muscle strain.    She reports experiencing severe cold symptoms at night, necessitating the use of small heaters. She is not experiencing any fevers but currently feels clammy and sweaty. Additionally, she has been dealing with a persistent cough. She does not have any steroids at home. She mentions that her nebulizer is broken and [facility], with the intention of purchasing a battery-operated one as a backup in case of power outages. She also notes that she was unable to access Tame to communicate with me when her machine malfunctioned. She recalls an incident at her workplace where a colleague fell ill with vomiting and diarrhea, suspected to be either C. difficile or influenza A. Following this incident, she began to feel unwell.    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 She is uncertain about the cause of this pain. She has been working extensively, often between 10 to 11 shifts per week, and believes this may be contributing to her discomfort. She has FMLA.    Supplemental Information  She reports a weight gain, estimating her current weight to be around 174 or 175 pounds.    Past Medical History:   Diagnosis Date    Allergic rhinitis     Anxiety disorder     Cleft lip     Divergent strabismus     Diverticulitis 2013    s/p partial colectomy, Dr Ortiz    Hyperlipidemia LDL goal <100     Hypertension     Obesity (BMI 30-39.9)     Severe persistent asthma without complication (HCC)     intubated x 2013    Type 2 diabetes mellitus (HCC)

## 2025-03-20 RX ORDER — IBUPROFEN 800 MG/1
800 TABLET, FILM COATED ORAL 2 TIMES DAILY PRN
Qty: 120 TABLET | Refills: 1 | Status: SHIPPED | OUTPATIENT
Start: 2025-03-20

## 2025-03-20 RX ORDER — HYDROXYZINE HYDROCHLORIDE 25 MG/1
25 TABLET, FILM COATED ORAL NIGHTLY
Qty: 30 TABLET | Refills: 0 | Status: SHIPPED | OUTPATIENT
Start: 2025-03-20 | End: 2025-04-19

## 2025-03-31 ENCOUNTER — OFFICE VISIT (OUTPATIENT)
Dept: PULMONOLOGY | Age: 60
End: 2025-03-31
Payer: COMMERCIAL

## 2025-03-31 VITALS
WEIGHT: 183.6 LBS | OXYGEN SATURATION: 97 % | BODY MASS INDEX: 30.59 KG/M2 | HEIGHT: 65 IN | TEMPERATURE: 98.1 F | HEART RATE: 90 BPM | SYSTOLIC BLOOD PRESSURE: 128 MMHG | DIASTOLIC BLOOD PRESSURE: 74 MMHG | RESPIRATION RATE: 18 BRPM

## 2025-03-31 DIAGNOSIS — K21.9 GASTROESOPHAGEAL REFLUX DISEASE WITHOUT ESOPHAGITIS: ICD-10-CM

## 2025-03-31 DIAGNOSIS — J45.50 SEVERE PERSISTENT ASTHMA, UNSPECIFIED WHETHER COMPLICATED (HCC): Primary | ICD-10-CM

## 2025-03-31 DIAGNOSIS — K44.9 HIATAL HERNIA: ICD-10-CM

## 2025-03-31 PROCEDURE — 3074F SYST BP LT 130 MM HG: CPT | Performed by: INTERNAL MEDICINE

## 2025-03-31 PROCEDURE — 99214 OFFICE O/P EST MOD 30 MIN: CPT | Performed by: INTERNAL MEDICINE

## 2025-03-31 PROCEDURE — 3078F DIAST BP <80 MM HG: CPT | Performed by: INTERNAL MEDICINE

## 2025-03-31 NOTE — PROGRESS NOTES
Subjective:     Gabriel Resendiz is a 59 y.o. female who complains today of:     Chief Complaint   Patient presents with    Follow-up     4 Month F/U for Severe persistent asthma       HPI  Patient presents for asthma      3/31/2025  Doing good, symptoms controlled, currently on Fasenra Trelegy and Singulair, no nighttime symptoms, no shortness of breath, no coughing, weight is stable, no heartburn, no nasal congestion or postnasal drip.  Insurance sent her a letter about possible charge of $1600 for Fasenra.    11/25/2024  Doing good, symptoms controlled, no coughing, no chest pain, no shortness of breath, no heartburn, no nasal congestion postnasal drip, weight is stable.  She has not been using budesonide nebs, she is on Breztri, Singulair, and Fasenra.    7/25/2024  Presents for follow-up, doing good in general, she had recent exacerbation in June mainly due to hot weather and her air conditioning was not working.  Otherwise doing good now, no coughing, no chest pain, no shortness of breath, no heartburn, no lower extremity.  She does not wish to do hernia repair surgery.  She reported numbness in her right thumb after ABG sample attempt    3/6/2024  Patient reports increased shortness of breath, with chest tightness, dry cough, symptoms worse over the last 2 days, she does report sick contact, no fever or chills, no worsening lower extremity edema, she has frequent heartburn, no nasal congestion or postnasal drip.      10/2/2023  She is doing better, still has some shortness of breath, cough is minimal, no chest pain, no lower extremity edema, no fever no chills, she is currently on Breztri and      8/14/2023  Not doing good, she ran out of her Fasenra and since then symptoms are worse, she is more short of breath, she has cough, no chest pain, no lower extremity edema, no fever no chills, cough is dry, no nasal congestion or postnasal drip and no heartburn.      2/14/2023  Doing good, symptoms controlled, 
all other ROS negative except as per HPI

## 2025-05-06 RX ORDER — HYDROXYZINE HYDROCHLORIDE 25 MG/1
25 TABLET, FILM COATED ORAL NIGHTLY
Qty: 30 TABLET | Refills: 0 | Status: SHIPPED | OUTPATIENT
Start: 2025-05-06 | End: 2025-06-05

## 2025-05-29 DIAGNOSIS — J45.50 SEVERE PERSISTENT ASTHMA, UNSPECIFIED WHETHER COMPLICATED (HCC): ICD-10-CM

## 2025-05-29 NOTE — TELEPHONE ENCOUNTER
New PA and script needed for Fasenra      Please approve or deny this request.    Rx requested:  Requested Prescriptions     Pending Prescriptions Disp Refills    Benralizumab (FASENRA PEN) 30 MG/ML SOAJ 0.28 mL 5     Sig: Inject 1 pen into skin (subcutaneous) every 8 weeks         Last Office Visit:   3/31/2025      Next Visit Date:  Future Appointments   Date Time Provider Department Center   7/3/2025  9:00 AM [place] CT ROOM 2 [facility] Fac RAD   7/3/2025  9:30 AM Robke, Drew M, MD [facility]   8/4/2025  9:00 AM Gregg Taylor MD [place] [facility]

## 2025-05-30 RX ORDER — BENRALIZUMAB 30 MG/ML
INJECTION, SOLUTION SUBCUTANEOUS
Qty: 0.28 ML | Refills: 5 | Status: SHIPPED | OUTPATIENT
Start: 2025-05-30

## 2025-07-09 ENCOUNTER — TELEPHONE (OUTPATIENT)
Age: 60
End: 2025-07-09

## 2025-07-09 RX ORDER — PREDNISONE 10 MG/1
TABLET ORAL
Qty: 20 TABLET | Refills: 2 | Status: SHIPPED | OUTPATIENT
Start: 2025-07-09

## 2025-07-09 NOTE — TELEPHONE ENCOUNTER
Julia the manager of patient called wanting to know if the patient had any restrictions when she returned to work. Julia states the patient is currently on FMLA and would have some limitations. Julia would like a call back after 7:30 a.m   Julia [phone]

## 2025-07-11 ENCOUNTER — TELEPHONE (OUTPATIENT)
Age: 60
End: 2025-07-11

## 2025-07-11 NOTE — TELEPHONE ENCOUNTER
Lmom for pt to come  the letters she requested because everytime I try to fax from several fax machines it says line is busy

## (undated) DEVICE — BRUSH ENDO CLN L90.5IN SHTH DIA1.7MM BRIST DIA5-7MM 2-6MM

## (undated) DEVICE — Device: Brand: ENDO SMARTCAP

## (undated) DEVICE — TUBE SET 96 MM 64 MM H2O PERISTALTIC STD AUX CHANNEL

## (undated) DEVICE — TUBING, SUCTION, 1/4" X 10', STRAIGHT: Brand: MEDLINE

## (undated) DEVICE — GUIDEWIRE ENDO L210CM MRK SPR TIP SAFEGUIDE

## (undated) DEVICE — FORCEPS BX L240CM JAW DIA2.8MM L CAP W/ NDL MIC MESH TOOTH

## (undated) DEVICE — SINGLE PORT MANIFOLD: Brand: NEPTUNE 2